# Patient Record
Sex: FEMALE | Race: BLACK OR AFRICAN AMERICAN | NOT HISPANIC OR LATINO | Employment: OTHER | URBAN - METROPOLITAN AREA
[De-identification: names, ages, dates, MRNs, and addresses within clinical notes are randomized per-mention and may not be internally consistent; named-entity substitution may affect disease eponyms.]

---

## 2017-01-13 ENCOUNTER — OFFICE VISIT (OUTPATIENT)
Dept: INTERNAL MEDICINE | Facility: CLINIC | Age: 62
End: 2017-01-13
Payer: MEDICARE

## 2017-01-13 VITALS
BODY MASS INDEX: 45.84 KG/M2 | SYSTOLIC BLOOD PRESSURE: 142 MMHG | WEIGHT: 249.13 LBS | HEART RATE: 90 BPM | DIASTOLIC BLOOD PRESSURE: 88 MMHG | HEIGHT: 62 IN | OXYGEN SATURATION: 97 %

## 2017-01-13 DIAGNOSIS — E78.5 HYPERLIPIDEMIA, UNSPECIFIED HYPERLIPIDEMIA TYPE: ICD-10-CM

## 2017-01-13 DIAGNOSIS — E66.01 MORBID OBESITY WITH BODY MASS INDEX (BMI) OF 45.0 TO 49.9 IN ADULT: ICD-10-CM

## 2017-01-13 DIAGNOSIS — E11.9 TYPE 2 DIABETES MELLITUS WITHOUT COMPLICATION, WITHOUT LONG-TERM CURRENT USE OF INSULIN: Primary | ICD-10-CM

## 2017-01-13 LAB — GLUCOSE SERPL-MCNC: 100 MG/DL (ref 70–110)

## 2017-01-13 PROCEDURE — 99999 PR PBB SHADOW E&M-EST. PATIENT-LVL III: CPT | Mod: PBBFAC,,, | Performed by: INTERNAL MEDICINE

## 2017-01-13 PROCEDURE — 99213 OFFICE O/P EST LOW 20 MIN: CPT | Mod: S$PBB,,, | Performed by: INTERNAL MEDICINE

## 2017-01-13 PROCEDURE — 99213 OFFICE O/P EST LOW 20 MIN: CPT | Mod: PBBFAC | Performed by: INTERNAL MEDICINE

## 2017-01-13 PROCEDURE — 82948 REAGENT STRIP/BLOOD GLUCOSE: CPT | Mod: PBBFAC | Performed by: INTERNAL MEDICINE

## 2017-01-13 RX ORDER — SIMVASTATIN 20 MG/1
20 TABLET, FILM COATED ORAL NIGHTLY
Qty: 30 TABLET | Refills: 2 | Status: SHIPPED | OUTPATIENT
Start: 2017-01-13 | End: 2017-05-17
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
## 2017-02-21 ENCOUNTER — DOCUMENTATION ONLY (OUTPATIENT)
Dept: BARIATRICS | Facility: CLINIC | Age: 62
End: 2017-02-21

## 2017-04-05 ENCOUNTER — TELEPHONE (OUTPATIENT)
Dept: ORTHOPEDICS | Facility: CLINIC | Age: 62
End: 2017-04-05

## 2017-04-05 DIAGNOSIS — M54.2 CERVICAL SPINE PAIN: Primary | ICD-10-CM

## 2017-04-25 ENCOUNTER — HOSPITAL ENCOUNTER (OUTPATIENT)
Dept: RADIOLOGY | Facility: HOSPITAL | Age: 62
Discharge: HOME | End: 2017-04-25
Attending: ORTHOPAEDIC SURGERY
Payer: MEDICARE

## 2017-04-25 ENCOUNTER — OFFICE VISIT (OUTPATIENT)
Dept: ORTHOPEDICS | Facility: CLINIC | Age: 62
End: 2017-04-25
Payer: MEDICARE

## 2017-04-25 VITALS — HEIGHT: 62 IN | WEIGHT: 252.88 LBS | BODY MASS INDEX: 46.53 KG/M2

## 2017-04-25 DIAGNOSIS — M25.562 CHRONIC PAIN OF BOTH KNEES: Primary | ICD-10-CM

## 2017-04-25 DIAGNOSIS — G89.29 CHRONIC PAIN OF BOTH KNEES: Primary | ICD-10-CM

## 2017-04-25 DIAGNOSIS — M25.561 CHRONIC PAIN OF BOTH KNEES: Primary | ICD-10-CM

## 2017-04-25 DIAGNOSIS — M25.561 CHRONIC PAIN OF BOTH KNEES: ICD-10-CM

## 2017-04-25 DIAGNOSIS — M17.9 OSTEOARTHRITIS OF KNEE, UNSPECIFIED: ICD-10-CM

## 2017-04-25 DIAGNOSIS — M25.562 CHRONIC PAIN OF BOTH KNEES: ICD-10-CM

## 2017-04-25 DIAGNOSIS — G89.29 CHRONIC PAIN OF BOTH KNEES: ICD-10-CM

## 2017-04-25 PROCEDURE — 99999 PR PBB SHADOW E&M-EST. PATIENT-LVL III: CPT | Mod: PBBFAC,,, | Performed by: ORTHOPAEDIC SURGERY

## 2017-04-25 PROCEDURE — 73562 X-RAY EXAM OF KNEE 3: CPT | Mod: 26,50,, | Performed by: RADIOLOGY

## 2017-04-25 PROCEDURE — 73560 X-RAY EXAM OF KNEE 1 OR 2: CPT | Mod: 26,59,RT, | Performed by: RADIOLOGY

## 2017-04-25 PROCEDURE — 73560 X-RAY EXAM OF KNEE 1 OR 2: CPT | Mod: TC,RT

## 2017-04-25 PROCEDURE — 73562 X-RAY EXAM OF KNEE 3: CPT | Mod: TC,50

## 2017-04-25 PROCEDURE — 99214 OFFICE O/P EST MOD 30 MIN: CPT | Mod: S$PBB,,, | Performed by: ORTHOPAEDIC SURGERY

## 2017-04-25 NOTE — PROGRESS NOTES
HPI:    Isabel Dennis is a 62 y.o. female who is here today for   Chief Complaint   Patient presents with    Left Knee - Pain    Right Knee - Pain   .     Duration: 36 months  Intensity: severe  Character of pain: sharp  Location: She reports that the pain is predominately  medial  Patient's pain increases with activity.  Pain is increased with weightbearing and interferes with activities of daily living.    She has tried conservative management including NSAIDS, injections, and activity modification without relief.    She has discussed options with her family and wishes to schedule TKA.     PMSFSH reviewed per clinic record       Past Medical History:   Diagnosis Date    Allergy     Anxiety     Arthritis     DDD (degenerative disc disease), cervical     Depression     Diabetes mellitus     Diverticulosis     GERD (gastroesophageal reflux disease)     Hx of colonic polyps     Lung disease     Post-traumatic osteoarthritis of both knees           Current Outpatient Prescriptions:     AMITIZA 24 mcg Cap, , Disp: , Rfl:     amitriptyline (ELAVIL) 50 MG tablet, Take 50 mg by mouth 2 (two) times daily.  , Disp: , Rfl:     carisoprodol (SOMA) 350 MG tablet, Take 350 mg by mouth 3 (three) times daily.  , Disp: , Rfl:     cod liver oil Oil, Take by mouth continuous prn., Disp: , Rfl:     ergocalciferol (VITAMIN D2) 50,000 unit Cap, Take 1 capsule (50,000 Units total) by mouth twice a week., Disp: 24 capsule, Rfl: 0    esomeprazole (NEXIUM) 20 MG capsule, Take 20 mg by mouth 2 (two) times daily before meals.  , Disp: , Rfl:     fish oil-omega-3 fatty acids 300-1,000 mg capsule, Take 2 g by mouth once daily., Disp: , Rfl:     FLAXSEED OIL ORAL, Take 1,000 mg by mouth once daily. Pt states takes 4 daily, total of 4000 mg daily, Disp: , Rfl:     fluticasone (FLONASE) 50 mcg/actuation nasal spray, 2 sprays by Each Nare route once daily., Disp: 1 Bottle, Rfl: 6    FREESTYLE LITE STRIPS Strp, , Disp: , Rfl:  "    ibuprofen (ADVIL,MOTRIN) 800 MG tablet, Take 800 mg by mouth 2 (two) times daily. , Disp: , Rfl:     naproxen (NAPROSYN) 500 MG tablet, , Disp: , Rfl:     NEXIUM 40 mg capsule, , Disp: , Rfl:     NORCO  mg Tab, Take 1 tablet by mouth. , Disp: , Rfl:     OPANA ER 30 mg TR12, , Disp: , Rfl:     potassium chloride SA (K-DUR,KLOR-CON) 20 MEQ tablet, Take 20 mEq by mouth once daily. , Disp: , Rfl:     diazePAM (VALIUM) 10 MG Tab, , Disp: , Rfl:     simvastatin (ZOCOR) 20 MG tablet, Take 1 tablet (20 mg total) by mouth every evening. For Cholesterol., Disp: 30 tablet, Rfl: 2     Review of patient's allergies indicates:   Allergen Reactions    Iodinated contrast media - oral and iv dye Hives and Rash        ROS  Constitutional: Negative for fever, Negative for weight loss  HENT Negative for congestion  Cardiovascular: Negative chest pain  Respiratory: Negative Shortness of breath  Heme: Negative excessive bleeding  Skin:NegativeItching, Negative breakdown  Musculoskeletal:Positive for back pain, Positive for joint pain, Negative muscle pain, Negative muscle weakness  Neurological: Negative for numbness and paresthesias   Psychiatric/Behavioral: Negative altered mental status, Negative for depression    Physical Exam:   Ht 5' 2" (1.575 m)  Wt 114.7 kg (252 lb 13.9 oz)  BMI 46.25 kg/m2  General appearance: This is a well-developed, Well nourished female No obvious acute distress.  Psychiatric: normal mood and affect;  pleasant and conversant; judgment and thought content normal  Gait is coordinated. Patient has antalgic gait to the right  Cardiovascular: There are no swelling or varicosities present.   Respiratory: normal respiratory effort   Lymphatic: no adenopathy   Neurologic: alert and oriented to person, place, and time   Deep Tendon Reflexes are normal;  Coordination and Balance: Normal   Musculoskeletal   Neck    ROM shows normal flexion and extension and lateral rotation    Palpation: " Non-tender    Stability is normal    Strength is normal    Skin is normal without masses and lesions    Sensation is intact to light touch   Back    ROM showsabnormal flexion, extension and     rotation    Palpation shows no masses    Stability is normal    Strength to flexion and extension well maintained    Core strength is diminished    Skin shows no rashes or cafe au lait spots;     Sensation is intact to light touch  Right hip   Range of motion normal    Left Hip  Range of motionnormal    Right Knee  Swelling None  TendernessMedial joint line  Range of Motion:   Motion is painfulYes  Crepitance presentYes    Right Leg  Neurologic Intact  Pulses Intact    Left Knee: Swelling Mild  TendernessMedial joint line  Range of Motion: 110   Motion is painful Yes    Left Leg   Neurologic Intact  Pulses Intact    Radiograph: Show severe degenerative arthritis with subchondral sclerosis, periarticular osteophytes and narrowing of joint space.  Angle: significant varus    Physical therapy is contraindicated due to potential bone loss on this severe arthritic joint.    Assessment:  Knee arthritis right      She will need to be cleared in our PreOp center.         .    She  has a past medical history of Allergy; Anxiety; Arthritis; DDD (degenerative disc disease), cervical; Depression; Diabetes mellitus; Diverticulosis; GERD (gastroesophageal reflux disease); colonic polyps; Lung disease; and Post-traumatic osteoarthritis of both knees. . We will have to take this into account. She  will be followed by the hospitalist service while in the hospital.       We have gone over the hospitalization and recovery with her as well.  This is typically around 2 weeks on a walker and transition to a cane after that.  She will have home health likely for 3-4 weeks and then transition to outpatient if necessary.  She is agreement with this plan of care and is ready to proceed.  I will see her back for clinical recheck at the 2-week  postop azucena.  I will see her back for clinical recheck for any other questions or problems as needed and certainly for any other issues in the interim.    We have discussed risks of total knee replacement which include but are not limited to blood clots in the legs that can travel to the lungs (pulmonary embolism). Pulmonary embolism can cause shortness of breath, chest pain, and even shock. Other risks include urinary tract infection, nausea and vomiting (usually related to pain medication), chronic knee pain and stiffness, bleeding into the knee joint, nerve damage, blood vessel injury, and infection of the knee which can require re-operation. Furthermore, the risks of anesthesia include potential heart, lung, kidney, and liver damage.

## 2017-04-26 DIAGNOSIS — M17.9 OSTEOARTHRITIS OF KNEE, UNSPECIFIED: Primary | ICD-10-CM

## 2017-05-04 ENCOUNTER — TELEPHONE (OUTPATIENT)
Dept: ORTHOPEDICS | Facility: CLINIC | Age: 62
End: 2017-05-04

## 2017-05-04 NOTE — TELEPHONE ENCOUNTER
----- Message from Pilar Ventura sent at 5/4/2017  9:35 AM CDT -----  Contact: self/home  Pt would like to reschedule her joint camp class.

## 2017-05-09 DIAGNOSIS — M79.606 PAIN OF LOWER EXTREMITY, UNSPECIFIED LATERALITY: ICD-10-CM

## 2017-05-09 DIAGNOSIS — R73.09 ELEVATED HEMOGLOBIN A1C: ICD-10-CM

## 2017-05-09 DIAGNOSIS — Z91.89 AT RISK OF UTI: Primary | ICD-10-CM

## 2017-05-16 ENCOUNTER — ANESTHESIA EVENT (OUTPATIENT)
Dept: SURGERY | Facility: HOSPITAL | Age: 62
DRG: 470 | End: 2017-05-16
Payer: MEDICARE

## 2017-05-16 ENCOUNTER — OFFICE VISIT (OUTPATIENT)
Dept: ORTHOPEDICS | Facility: CLINIC | Age: 62
End: 2017-05-16
Payer: MEDICARE

## 2017-05-16 VITALS — WEIGHT: 249.13 LBS | HEIGHT: 62 IN | BODY MASS INDEX: 45.84 KG/M2

## 2017-05-16 DIAGNOSIS — M17.11 PRIMARY OSTEOARTHRITIS OF RIGHT KNEE: Primary | ICD-10-CM

## 2017-05-16 PROCEDURE — 99213 OFFICE O/P EST LOW 20 MIN: CPT | Mod: PBBFAC | Performed by: PHYSICIAN ASSISTANT

## 2017-05-16 PROCEDURE — 99499 UNLISTED E&M SERVICE: CPT | Mod: S$PBB,,, | Performed by: PHYSICIAN ASSISTANT

## 2017-05-16 PROCEDURE — 99999 PR PBB SHADOW E&M-EST. PATIENT-LVL III: CPT | Mod: PBBFAC,,, | Performed by: PHYSICIAN ASSISTANT

## 2017-05-16 RX ORDER — OXYMORPHONE HYDROCHLORIDE 20 MG/1
30 TABLET, FILM COATED, EXTENDED RELEASE ORAL DAILY
Status: ON HOLD | COMMUNITY
End: 2017-06-05 | Stop reason: HOSPADM
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
## 2017-05-17 ENCOUNTER — HOSPITAL ENCOUNTER (OUTPATIENT)
Dept: CARDIOLOGY | Facility: CLINIC | Age: 62
Discharge: HOME | End: 2017-05-17
Payer: MEDICARE

## 2017-05-17 ENCOUNTER — HOSPITAL ENCOUNTER (OUTPATIENT)
Dept: PREADMISSION TESTING | Facility: HOSPITAL | Age: 62
Discharge: HOME | End: 2017-05-17
Attending: ANESTHESIOLOGY
Payer: MEDICARE

## 2017-05-17 ENCOUNTER — INITIAL CONSULT (OUTPATIENT)
Dept: INTERNAL MEDICINE | Facility: CLINIC | Age: 62
End: 2017-05-17
Payer: MEDICARE

## 2017-05-17 ENCOUNTER — OFFICE VISIT (OUTPATIENT)
Dept: NEUROLOGY | Facility: CLINIC | Age: 62
End: 2017-05-17
Payer: MEDICARE

## 2017-05-17 VITALS
HEART RATE: 84 BPM | BODY MASS INDEX: 47.59 KG/M2 | HEIGHT: 62 IN | SYSTOLIC BLOOD PRESSURE: 138 MMHG | DIASTOLIC BLOOD PRESSURE: 80 MMHG | WEIGHT: 258.63 LBS

## 2017-05-17 VITALS
OXYGEN SATURATION: 97 % | WEIGHT: 258.63 LBS | SYSTOLIC BLOOD PRESSURE: 138 MMHG | HEART RATE: 84 BPM | DIASTOLIC BLOOD PRESSURE: 84 MMHG | BODY MASS INDEX: 47.59 KG/M2 | TEMPERATURE: 98 F | HEIGHT: 62 IN

## 2017-05-17 VITALS
BODY MASS INDEX: 47.59 KG/M2 | WEIGHT: 258.63 LBS | OXYGEN SATURATION: 97 % | RESPIRATION RATE: 20 BRPM | HEIGHT: 62 IN | TEMPERATURE: 98 F

## 2017-05-17 DIAGNOSIS — R73.09 ELEVATED HEMOGLOBIN A1C: ICD-10-CM

## 2017-05-17 DIAGNOSIS — M79.604 PAIN IN BOTH LOWER EXTREMITIES: ICD-10-CM

## 2017-05-17 DIAGNOSIS — M54.2 CERVICALGIA: Primary | ICD-10-CM

## 2017-05-17 DIAGNOSIS — M79.606 PAIN OF LOWER EXTREMITY, UNSPECIFIED LATERALITY: ICD-10-CM

## 2017-05-17 DIAGNOSIS — R60.9 EDEMA, UNSPECIFIED TYPE: ICD-10-CM

## 2017-05-17 DIAGNOSIS — M79.605 PAIN IN BOTH LOWER EXTREMITIES: ICD-10-CM

## 2017-05-17 DIAGNOSIS — F11.90 CHRONIC, CONTINUOUS USE OF OPIOIDS: ICD-10-CM

## 2017-05-17 DIAGNOSIS — M15.9 OSTEOARTHRITIS OF MULTIPLE JOINTS, UNSPECIFIED OSTEOARTHRITIS TYPE: ICD-10-CM

## 2017-05-17 DIAGNOSIS — K59.00 CONSTIPATION, UNSPECIFIED CONSTIPATION TYPE: ICD-10-CM

## 2017-05-17 DIAGNOSIS — K21.9 GASTROESOPHAGEAL REFLUX DISEASE, ESOPHAGITIS PRESENCE NOT SPECIFIED: ICD-10-CM

## 2017-05-17 DIAGNOSIS — L81.8 TATTOOS: ICD-10-CM

## 2017-05-17 DIAGNOSIS — R20.2 PARESTHESIA OF RIGHT FOOT: ICD-10-CM

## 2017-05-17 DIAGNOSIS — D69.6 THROMBOCYTOPENIA: ICD-10-CM

## 2017-05-17 DIAGNOSIS — Z01.818 PREOP EXAMINATION: Primary | ICD-10-CM

## 2017-05-17 DIAGNOSIS — E11.9 TYPE 2 DIABETES MELLITUS WITHOUT COMPLICATION, WITHOUT LONG-TERM CURRENT USE OF INSULIN: ICD-10-CM

## 2017-05-17 DIAGNOSIS — E66.01 MORBID OBESITY, UNSPECIFIED OBESITY TYPE: ICD-10-CM

## 2017-05-17 DIAGNOSIS — F41.8 DEPRESSION WITH ANXIETY: ICD-10-CM

## 2017-05-17 DIAGNOSIS — Z91.89 AT RISK OF UTI: ICD-10-CM

## 2017-05-17 DIAGNOSIS — E66.01 OBESITY, MORBID, BMI 50 OR HIGHER: ICD-10-CM

## 2017-05-17 PROCEDURE — 99215 OFFICE O/P EST HI 40 MIN: CPT | Mod: S$PBB,,, | Performed by: HOSPITALIST

## 2017-05-17 PROCEDURE — 99205 OFFICE O/P NEW HI 60 MIN: CPT | Mod: S$PBB,,,

## 2017-05-17 PROCEDURE — 99999 PR PBB SHADOW E&M-EST. PATIENT-LVL III: CPT | Mod: PBBFAC,,, | Performed by: HOSPITALIST

## 2017-05-17 PROCEDURE — 99213 OFFICE O/P EST LOW 20 MIN: CPT | Mod: PBBFAC,27 | Performed by: HOSPITALIST

## 2017-05-17 PROCEDURE — 99999 PR PBB SHADOW E&M-EST. PATIENT-LVL III: CPT | Mod: PBBFAC,,,

## 2017-05-17 PROCEDURE — 93010 ELECTROCARDIOGRAM REPORT: CPT | Mod: S$PBB,,, | Performed by: INTERNAL MEDICINE

## 2017-05-17 RX ORDER — MULTIVIT WITH MINERALS/HERBS
1 TABLET ORAL DAILY
COMMUNITY
End: 2020-02-05

## 2017-05-17 NOTE — PRE-PROCEDURE INSTRUCTIONS
PreOp Instructions given:  - Written medication information (what to hold and what to take)  - NPO guidelines  - Arrival place directions given  - Time to be given the day before procedure by the  Surgeon's Office  - Bathing with antibacterial soap/Hibiclens   - Don't wear any jewelry or bring any valuables AM of surgery  - No makeup or moisturizer to face  - No perfume/cologne, powder, lotions or aftershave    Pt. verbalized understanding.

## 2017-05-17 NOTE — PROGRESS NOTES
This office note has been dictated.  HISTORY OF PRESENT ILLNESS:  This is my first clinic encounter with patient,   Isabel Dennis.  This 62-year-old left-handed lady presents to Neurology Clinic   with a chief complaint of neck discomfort.  The history of present illness   probably begins more than 20 years ago.  She had surgery performed by Dr. Bullock   at [facility] on the cervical spine.  A second procedure was then performed   in 2005, at [facility] by Dr. Rodriguez.  This was a fusion with   hardware.  She apparently did well until perhaps 4 to 6 months ago when she   suffered a fall and struck her head, but was not knocked unconscious.  Her neck   has been hurting her since.  Sometimes while walking, she notices a numb   sensation extending from her neck down to her right heel.    NEUROLOGICAL REVIEW OF SYSTEMS:  She has night sweats related to the menopause.    There is occasional numbness in the left hand.  She also has bilateral leg   pain, particularly in the evening time as well as bilateral knee pain during the   day, which will likely require replacements.  She is unsteady on her feet   because of her degenerative arthritis in the knees.  She sometimes notes   blurriness of vision and admits to an element of anxiety and depression.  Elavil   has been prescribed for her.  She also suffers from constipation, which is   likely medication related secondary to Norco and occasional oxymorphone, which   she takes for her knee pain.  She denies fever, dizziness, tinnitus, hearing loss,  Headaches, speech or swallowing difficulty, chest pain, cough,shortness of breath,  Nausea, vomiting, focal weakness, and incontinence.    PAST MEDICAL HISTORY:  Reviewed with the patient, edited by me in the electronic   record and is included in this note.    PHYSICAL EXAMINATION:  GENERAL:  She is morbidly obese, neatly dressed and in no acute distress.  VITAL SIGNS:  Reviewed and also included in this note.  NECK:   Supple.  The pulses equal and no bruits are heard.  NEUROLOGIC:  This is an alert, oriented and attentive lady.  Her speech is   appropriate and adequately articulated.  Cranial nerve examination reveals her   acuity to be 20/30 to the near card without correction.  The visual fields are   full.  Pupils are equal and reactive to light and the extraocular movements are   conjugate.  The optic disks cannot be well visualized.  There are no   sensorimotor deficits on the face and tongue and palate are in the midline.    Hearing is equal in the two ears.    On motor examination, there is no focal motor weakness.  She has normal   muscle bulk and tone in all extremities.    On sensory examination, all modalities are intact and she has palpable peripheral pulses.    On cerebellar testing, finger-to-nose, fine motor and rapid alternating   movements are adequately and equally performed.  She can rise from the chair   without pushing up with her hands and is able to walk on a narrow base with an   antalgic gait because of knee pain.    Romberg sign is not present.    The deep tendon reflexes are symmetrical with the exception of the right ankle jerk,   which is diminished.  Her plantar responses are flexor bilaterally.    I reviewed her clinic record and discussed the situation with her in detail.    ASSESSMENT AND PLAN:  In conclusion, this is a lady with a history of 2 prior   neck surgeries as well as degenerative arthritis of the knees.  Her main   complaints are neck pain, paresthesias extending from the neck down to the right   heel with ambulation, and bilateral leg pain.  She is tentatively scheduled for   knee replacement surgery, but does have concerns about her spine at this point.    I will go ahead and obtain some screening blood work today for arthritic   conditions and refer her for outpatient MR imaging of the cervical and lumbar   spine.  I will forward the results to her with any additional  recommendations as   indicated.  I am otherwise referring her back to her primary care for her for  follow up.      At the conclusion of the encounter, all questions were answered.  She may   return to Neurology Clinic on an as needed basis.      FSO/HN  dd: 05/17/2017 14:01:07 (CDT)  td: 05/17/2017 14:41:38 (CDT)  Doc ID   #[id number]  Job ID #[id number]    CC:

## 2017-05-17 NOTE — MR AVS SNAPSHOT
[facility]  [address]  [place] [address]  Phone: [phone]  Fax: [phone]                  Isabel Dennis   2017 1:40 PM   Office Visit    Description:  Female : 1955   Provider:  Jean Byrnes III, MD   Department:  [facility] - Neurology           Reason for Visit     Consult           Diagnoses this Visit        Comments    Cervicalgia    -  Primary     Paresthesia of right foot         Pain in both lower extremities         Type 2 diabetes mellitus without complication, without long-term current use of insulin         Osteoarthritis of multiple joints, unspecified osteoarthritis type         Obesity, morbid, BMI 50 or higher         Depression with anxiety                To Do List           Future Appointments        Provider Department Dept Phone    2017  3:00 PM Caty Red NP [facility] [phone]    2017 4:00 PM Cindi Link MD [facility] - Pre Op Consult [phone]    2017 1:30 PM JOINT CAMP, [facility] - Orthopedics Class [phone]    2017 10:30 AM Sharda Sandhu PA-C [facility] [phone]    2017 9:30 AM Sadia De La Rosa MD [facility] - Internal Medicine [phone]      Your Future Surgeries/Procedures     May 31, 2017   Surgery with [facility]   [facility] ([facility])    [address] [facility] [address] [phone]              Goals (5 Years of Data)     None      Follow-Up and Disposition     Return if symptoms worsen or fail to improve.      [facility]     [facility] Nurse Care Line - 24/ Assistance  Unless otherwise directed by your provider, please contact [facility], our nurse care line that is available for 24/7 assistance.     Registered nurses in the [facility] provide: appointment scheduling, clinical advisement, health education, and other  advisory services.  Call: [phone] (toll free)               Medications           Message regarding Medications     Verify the changes and/or additions to your medication regime listed below are the same as discussed with your clinician today.  If any of these changes or additions are incorrect, please notify your healthcare provider.             Verify that the below list of medications is an accurate representation of the medications you are currently taking.  If none reported, the list may be blank. If incorrect, please contact your healthcare provider. Carry this list with you in case of emergency.           Current Medications     AMITIZA 24 mcg Cap     amitriptyline (ELAVIL) 50 MG tablet Take 50 mg by mouth 2 (two) times daily.      carisoprodol (SOMA) 350 MG tablet Take 350 mg by mouth 3 (three) times daily.      cod liver oil Oil Take by mouth continuous prn.    ergocalciferol (VITAMIN D2) 50,000 unit Cap Take 1 capsule (50,000 Units total) by mouth twice a week.    esomeprazole (NEXIUM) 20 MG capsule Take 20 mg by mouth 2 (two) times daily before meals.      fish oil-omega-3 fatty acids 300-1,000 mg capsule Take 2 g by mouth once daily.    FLAXSEED OIL ORAL Take 1,000 mg by mouth once daily. Pt states takes 4 daily, total of 4000 mg daily    fluticasone (FLONASE) 50 mcg/actuation nasal spray 2 sprays by Each Nare route once daily.    FREESTYLE LITE STRIPS Strp     ibuprofen (ADVIL,MOTRIN) 800 MG tablet Take 800 mg by mouth 2 (two) times daily.     naproxen (NAPROSYN) 500 MG tablet     NEXIUM 40 mg capsule     NORCO  mg Tab Take 1 tablet by mouth.     oxyMORphone (OPANA ER) 20 MG 12 hr tablet Take 20 mg by mouth every 12 (twelve) hours.    potassium chloride SA (K-DUR,KLOR-CON) 20 MEQ tablet Take 20 mEq by mouth once daily.     diazePAM (VALIUM) 10 MG Tab     simvastatin (ZOCOR) 20 MG tablet Take 1 tablet (20 mg total) by mouth every evening. For Cholesterol.           Clinical Reference  "Information           Your Vitals Were     BP Pulse Height Weight BMI    173/90 84 5' 2" (1.575 m) 117.3 kg (258 lb 9.6 oz) 47.3 kg/m2      Blood Pressure          Most Recent Value    BP  (!)  173/90      Allergies as of 5/17/2017     Iodinated Contrast Media - Oral And Iv Dye      Immunizations Administered on Date of Encounter - 5/17/2017     None      Orders Placed During Today's Visit     Future Labs/Procedures Expected by Expires    MARIPOSA  5/17/2017 7/16/2018    MRI Cervical Spine Without Contrast  5/17/2017 5/17/2018    MRI Lumbar Spine Without Contrast  5/17/2017 5/17/2018    Protein electrophoresis, serum  5/17/2017 7/16/2018    Rheumatoid factor  5/17/2017 7/16/2018      [facility] Sign-Up     Activating your [facility] account is as easy as 1-2-3!     1) Visit [email or web address], select Sign Up Now, enter this activation code and your date of birth, then select Next.  [email or web address]  Expires: 7/1/2017 12:45 PM      2) Create a username and password to use when you visit [facility] in the future and select a security question in case you lose your password and select Next.    3) Enter your e-mail address and click Sign Up!    Additional Information  If you have questions, please e-mail [email or web address] or call [phone] to talk to our [facility] staff. Remember, [facility] is NOT to be used for urgent needs. For medical emergencies, dial 911.         Language Assistance Services     ATTENTION: Language assistance services are available, free of charge. Please call [phone].      ATENCIÓN: Si habla español, tiene a holley disposición servicios gratuitos de asistencia lingüística. Llame al [phone].     CHÚ Ý: N?u b?n nói Ti?ng Vi?t, có các d?ch v? h? tr? ngôn ng? mi?n phí dành cho b?n. G?i s? [phone].         [facility] complies with applicable Federal civil rights laws and does not discriminate on the basis of race, color, national origin, age, disability, or sex.        "

## 2017-05-17 NOTE — PRE-PROCEDURE INSTRUCTIONS
Anesthesia: Regional Anesthesia  Youre scheduled for surgery. During surgery, youll receive medication called anesthesia to keep you comfortable and pain-free. Your surgeon has decided that youll receive regional anesthesia. This sheet tells you what to expect with this type of anesthesia.  What Is Regional Anesthesia?  Regional anesthesia numbs one region of your body. The anesthesia may be given around nerves or into veins in your arms, neck, or legs (nerve block or [place] block). Or it may be sent into the spinal fluid (spinal anesthesia) or into the space just outside the spinal fluid (epidural anesthesia). Sedatives may also be given to relax you.  Nerve Block or Abram Block  A small area of the body, such as an arm or leg, can be numbed using a nerve block or Abram block.  · Nerve block: During a nerve block, your skin is numbed. A needle is then inserted near nerves that serve the area to be numbed. Anesthetic is sent through the needle.  · IV regional or [place] block: For this type of block, an IV line is put into a vein. The blood flow to the area to be numbed is blocked for a short time. Anesthetic is sent through the IV.  Spinal Anesthesia  Spinal anesthesia numbs your body from about the waist down.  · Anesthetic is injected into the spinal fluid. This is a substance that surrounds the spinal cord in your spinal column. The anesthetic blocks pain traveling from the body to the brain.  · To receive the anesthetic, your skin is numbed at the injection site.  · A needle is then inserted into the spinal fluid. Anesthetic is sent through the needle.  Anesthesia Tools and Medications  · Local anesthetic given through a needle numbs one region of your body.  · Electrocardiography leads (electrodes) record your heart rate and rhythm.  · A blood pressure cuff monitors your blood pressure.  · A pulse oximeter on the end of the finger measures your blood oxygen level.  · Sedatives may be given through an IV to relax  you and keep you comfortable. You may stay awake or sleep slightly.  · Oxygen may be given to you through a facemask.  Risks and Possible Complications  Regional anesthesia carries some risks. These include:  · Nausea and vomiting  · Headache  · Backache  · Decreased blood pressure  · Allergic reaction to the anesthetic  · Ongoing numbness (rare)  · Irregular heartbeat (rare)  · Cardiac arrest (rare)   © 8286-6612 [facility], [address] [place] [address], [place] [address]. All rights reserved. This information is not intended as a substitute for professional medical care. Always follow your healthcare professional's instructions.

## 2017-05-17 NOTE — LETTER
May 17, 2017      Humberto Solis MD  [address] [facility] [address]           [facility]  [address] [facility] [address]  Phone: [phone]          Patient: Isabel Dennis   MR Number: [id number]   YOB: 1955   Date of Visit: 5/17/2017       Dear Dr. Humberto Solis:    Thank you for referring Isabel Dennis to me for evaluation. Attached you will find relevant portions of my assessment and plan of care.    If you have questions, please do not hesitate to call me. I look forward to following Isabel Dennis along with you.    Sincerely,    Cindi Link MD    Enclosure  CC:  No Recipients    If you would like to receive this communication electronically, please contact [email or web address] or [phone] to request more information on [employer] Link access.    For providers and/or their staff who would like to refer a patient to [facility], please contact us through our one-stop-shop provider referral line, [facility], at [phone].    If you feel you have received this communication in error or would no longer like to receive these types of communications, please e-mail [email or web address]

## 2017-05-17 NOTE — DISCHARGE INSTRUCTIONS
Your surgery has been scheduled for:__________________________________________    You should report to:  ____[facility], located on the [place] side of the first floor of the           [facility] ([phone])  ____[facility], located on the [facility] side of the            Second floor of the [facility] ([phone])  ____3rd Floor [facility] located on the [facility] side of the [facility] [phone]  Please Note   - Tell your doctor if you take Aspirin, products containing Aspirin, herbal medications  or blood thinners, such as Coumadin, Ticlid, or Plavix.  (Consult your provider regarding holding or stopping before surgery).  - Arrange for someone to drive you home following surgery.  You will not be allowed to leave the surgical facility alone or drive yourself home following sedation and anesthesia.  Before Surgery  - Stop taking all herbal medications 14days prior to surgery  - No Motrin/Advil (Ibuprofen) 7 days before surgery  - No Aleve (Naproxen) 7 days before surgery  - Stop Taking Asprin, products containing Asprin _____days before surgery  - Stop taking blood thinners_______days before surgery  - Refrain from drinking alcoholic beverages for 24hours before and after surgery  - Stop or limit smoking _________days before surgery  Night before Surgery  - DO NOT EAT OR DRINK ANYTHING AFTER MIDNIGHT, INCLUDING GUM, HARD CANDY, MINTS, OR CHEWING TOBACCO.  - Take a shower or bath (shower is recommended).  Bathe with Hibiclens soap or an antibacterial soap from the neck down.  If not supplied by your surgeon, hibiclens soap will need to be purchased over the counter in pharmacy.  Rinse soap off thoroughly.  - Shampoo your hair with your regular shampoo  The Day of Surgery  - Take another bath or shower with hibiclens or any antibacterial soap, to reduce the chance of infection.  - Take heart and blood  pressure medications with a small sip of water, as advised by the perioperative team.  - Do not take fluid pills  - You may brush your teeth and rinse your mouth, but do not swall any additional water.   - Do not apply perfumes, powder, body lotions or deodorant on the day of surgery.  - Nail polish should be removed.  - Do not wear makeup or moisturizer  - Wear comfortable clothes, such as a button front shirt and loose fitting pants.  - Leave all jewelry, including body piercings, and valuables at home.    - Bring any devices you will neeed after surgery such as crutches or canes.  - If you have sleep apnea, please bring your CPAP machine  In the event that your physical condition changes including the onset of a cold or respiratory illness, or if you have to delay or cancel your surgery, please notify your surgeon.  Anesthesia: General Anesthesia  Youre due to have surgery. During surgery, youll be given medication called anesthesia. (It is also called anesthetic.) This will keep you comfortable and pain-free. Your surgeon will use general anesthesia. This sheet tells you more about it.    What Is General Anesthesia?  General anesthesia puts you into a state like deep sleep. It goes into the bloodstream (IV anesthetics), into the lungs (gas anesthetics), or both. You feel nothing during the procedure. You will not remember it. During the procedure, the anesthesia provider monitors you. He or she checks your heart rate and rhythm, blood pressure, and blood oxygen.  · IV Anesthetics  IV anesthetics are given through an IV line in your arm. Theyre often given first. This is so you are asleep before a gas anesthetic is started. Some kinds of IV anesthetics relieve pain. Others relax you. Your doctor will decide which kind is best in your case.  · Gas Anesthetics  Gas anesthetics are breathed into the lungs. They are often used to keep you asleep. They can be given through a facemask, an endotracheal tube, or a  laryngeal mask airway.  ¨ If you have a facemask, your anesthesia provider will most likely place it over your nose and mouth while youre still awake. Youll breathe oxygen through the mask as your IV anesthetic is started. Gas anesthetic may be added through the mask.  ¨ If you have an endotracheal tube or a laryngeal mask airway, it will be inserted into your throat after youre asleep.  Anesthesia Tools and Medications  You will likely have:  · IV anesthetics sent through an IV line into your bloodstream.  · Gas anesthetics breathed into your lungs, where they pass into your bloodstream.  · A pulse oximeter on the end of your finger. This measures your blood oxygen level.  · Electrocardiography leads (electrodes) on your chest. These record your heart rate and rhythm.  · A blood pressure cuff. This reads your blood pressure.  Risks and Possible Complications  General anesthesia has some risks. These include:  · Breathing problems  · Nausea and vomiting  · Sore throat or hoarseness  · Allergic reaction to the anesthetic  · Ongoing numbness (rare)  · Irregular heartbeat (rare)  · Cardiac arrest (rare)   Anesthesia Safety  · Follow all instructions you are given for how long not to eat or drink before your procedure.  · Be sure your doctor knows what medications you take. This includes over-the-counter medications, herbs, and supplements.  · Have an adult family member or friend drive you home after the procedure.  · For the first 24 hours after your surgery:  ¨ Do not drive or use heavy equipment.  ¨ Do not make important decisions or sign documents.  ¨ Avoid alcohol.  ¨ Have someone stay with you, if possible. They can watch for problems and help keep you safe.  © 0797-1800 [employer], [address] [place] [address], [place] [address]. All rights reserved. This information is not intended as a substitute for professional medical care. Always follow your healthcare professional's instructions.

## 2017-05-17 NOTE — LETTER
May 17, 2017      Sadia De La Rosa MD  [address]  [facility] [address]           [facility]  [address]  [facility] [address]  Phone: [phone]  Fax: [phone]          Patient: Isabel Dennis   MR Number: [id number]   YOB: 1955   Date of Visit: 5/17/2017       Dear Dr. Sadia De La Rosa:    Thank you for referring Isabel Dennis to me for evaluation. Attached you will find relevant portions of my assessment and plan of care.    If you have questions, please do not hesitate to call me. I look forward to following Isabel Dennis along with you.    Sincerely,    Jean Byrnes III, MD    Enclosure  CC:  No Recipients    If you would like to receive this communication electronically, please contact [email or web address] or [phone] to request more information on [email or web address] Link access.    For providers and/or their staff who would like to refer a patient to [facility], please contact us through our one-stop-shop provider referral line, [facility] , at [phone].    If you feel you have received this communication in error or would no longer like to receive these types of communications, please e-mail [email or web address]

## 2017-05-17 NOTE — PATIENT INSTRUCTIONS
Verified PreOp Instructions given:    -- Medication information (what to hold and what to take)   -- NPO guidelines -- DO NOT EAT ANYTHING AFTER MIDNIGHT, INCLUDING GUM, HARD CANDY, MINTS, OR CHEWING TOBACCO.  -- Arrival place and directions given; time to be given the day before procedure by the Surgeon's Office   -- Bathing with antibacterial soap   -- Don't wear any jewelry or bring any valuables AM of surgery   -- No makeup or moisturizer to face   -- No perfume/cologne, powder, lotions or aftershave     Pt verbalized understanding.
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
## 2017-05-17 NOTE — IP AVS SNAPSHOT
[facility]  [address]  [facility] [address]  Phone: [phone]           Patient Discharge Instructions  Our goal is to set you up for success. This packet includes information on your condition, medications, and your home care. It will help you care for yourself to prevent having to return to the hospital.     Please ask your nurse if you have any questions.      There are many details to remember when preparing for your surgery. Here is what you will need to do, please ask your nurse if there are more specific instructions and if you have any questions:    1. Before procedure Do not smoke or drink alcoholic beverages 24 hours prior to your procedure. Do not eat or drink anything 8 hours before your procedure - this includes gum, mints, and candy.     2. Day of procedure Please remove all jewelry for the procedure. If you wear contact lenses, dentures, hearing aids or glasses, bring a container to put them in during your surgery and give to a family member.  If your doctor has scheduled you for an overnight stay, bring a small overnight bag with any personal items that you need.      3. After procedure  Make arrangements in advance for transportation home by a responsible adult. It is not safe to drive a vehicle during the 24 hours following surgery.     PLEASE NOTE: You may be contacted the day before your surgery to confirm your surgery date and arrival time. The Surgery schedule has many variables which may affect the time of your surgery case. Family members should be available if your surgery time changes.           [facility]  Unless otherwise directed by your provider, please   contact [facility], our nurse care line   that is available for 24/7 assistance.     [phone] (toll-free)     Registered nurses in the [facility]   provide: appointment scheduling, clinical advisement, health education, and other advisory services.                   ** Verify the list of medication(s) below is accurate and up to date. Carry this with you in case of emergency. If your medications have changed, please notify your healthcare provider.             Medication List      TAKE these medications        Additional Info                      AMITIZA 24 MCG Cap   Refills:  0   Generic drug:  lubiprostone      Begin Date    AM    Noon    PM    Bedtime       amitriptyline 50 MG tablet   Commonly known as:  ELAVIL   Refills:  0   Dose:  50 mg   Indications:  Depression    Instructions:  Take 50 mg by mouth 2 (two) times daily.     Begin Date    AM    Noon    PM    Bedtime       carisoprodol 350 MG tablet   Commonly known as:  SOMA   Refills:  0   Dose:  350 mg   Indications:  Muscle Spasm    Instructions:  Take 350 mg by mouth 3 (three) times daily.     Begin Date    AM    Noon    PM    Bedtime       cod liver oil Oil   Refills:  0    Instructions:  Take by mouth continuous prn.     Begin Date    AM    Noon    PM    Bedtime       diazePAM 10 MG Tab   Commonly known as:  VALIUM   Refills:  0      Begin Date    AM    Noon    PM    Bedtime       ergocalciferol 50,000 unit Cap   Commonly known as:  VITAMIN D2   Quantity:  24 capsule   Refills:  0   Dose:  98647 Units    Instructions:  Take 1 capsule (50,000 Units total) by mouth twice a week.     Begin Date    AM    Noon    PM    Bedtime       * esomeprazole 20 MG capsule   Commonly known as:  NEXIUM   Refills:  0   Dose:  20 mg    Instructions:  Take 20 mg by mouth 2 (two) times daily before meals.     Begin Date    AM    Noon    PM    Bedtime       * NEXIUM 40 MG capsule   Refills:  0   Generic drug:  esomeprazole      Begin Date    AM    Noon    PM    Bedtime       fish oil-omega-3 fatty acids 300-1,000 mg capsule   Refills:  0   Dose:  2 g    Instructions:  Take 2 g by mouth once daily.     Begin Date    AM    Noon    PM    Bedtime       FLAXSEED OIL ORAL   Refills:  0   Dose:  1000 mg    Instructions:  Take 1,000 mg by  mouth once daily. Pt states takes 4 daily, total of 4000 mg daily     Begin Date    AM    Noon    PM    Bedtime       fluticasone 50 mcg/actuation nasal spray   Commonly known as:  FLONASE   Quantity:  1 Bottle   Refills:  6   Dose:  2 spray    Instructions:  2 sprays by Each Nare route once daily.     Begin Date    AM    Noon    PM    Bedtime       FREESTYLE LITE STRIPS Strp   Refills:  0   Generic drug:  blood sugar diagnostic      Begin Date    AM    Noon    PM    Bedtime       ibuprofen 800 MG tablet   Commonly known as:  ADVIL,MOTRIN   Refills:  0   Dose:  800 mg    Instructions:  Take 800 mg by mouth 2 (two) times daily.     Begin Date    AM    Noon    PM    Bedtime       naproxen 500 MG tablet   Commonly known as:  NAPROSYN   Refills:  0      Begin Date    AM    Noon    PM    Bedtime       NORCO  mg Tab   Refills:  0   Dose:  1 tablet   Generic drug:  hydrocodone-acetaminophen 10-325mg    Instructions:  Take 1 tablet by mouth.     Begin Date    AM    Noon    PM    Bedtime       oxyMORphone 20 MG 12 hr tablet   Commonly known as:  OPANA ER   Refills:  0   Dose:  20 mg    Instructions:  Take 20 mg by mouth every 12 (twelve) hours.     Begin Date    AM    Noon    PM    Bedtime       potassium chloride SA 20 MEQ tablet   Commonly known as:  K-DUR,KLOR-CON   Refills:  0   Dose:  20 mEq    Instructions:  Take 20 mEq by mouth once daily.     Begin Date    AM    Noon    PM    Bedtime       simvastatin 20 MG tablet   Commonly known as:  ZOCOR   Quantity:  30 tablet   Refills:  2   Dose:  20 mg    Instructions:  Take 1 tablet (20 mg total) by mouth every evening. For Cholesterol.     Begin Date    AM    Noon    PM    Bedtime       * Notice:  This list has 2 medication(s) that are the same as other medications prescribed for you. Read the directions carefully, and ask your doctor or other care provider to review them with you.               Please bring to all follow up appointments:    1. A copy of your discharge  instructions.  2. All medicines you are currently taking in their original bottles.  3. Identification and insurance card.    Please arrive 15 minutes ahead of scheduled appointment time.    Please call 24 hours in advance if you must reschedule your appointment and/or time.        Your Scheduled Appointments     May 17, 2017  4:00 PM CDT   Consult with Cindi Link MD   [facility] - Pre Op Consult ([facility] )    [address] [facility] [id number] [phone]            May 18, 2017  1:30 PM CDT   Education Class with JOINT CAMP, [facility] - Orthopedics Class ([facility] )    [address]  [place] [id number] [phone]            Jun 13, 2017 10:30 AM CDT   Post OP with ALTA Padilla [facility] - Orthopedics ([facility] )    [address]  [place] [id number] [phone]            Jun 13, 2017  1:30 PM CDT   Mri L Spine Non Cont with [facility] WIDE BORE   [facility] ([facility] )    [address] [facility] [id number] [phone]            Jun 13, 2017  2:15 PM CDT   Mri C Spine Non Cont with [facility] WIDE BORE   [facility] ([facility] )    [address] [facility] [id number] [phone]              Your Future Surgeries/Procedures     May 31, 2017   Surgery with [facility]   [facility] ([facility])    [address] [facility] [id number] [phone]                  Discharge Instructions       Your surgery has been scheduled for:__________________________________________    You should report to:  ____[facility], located on the [place] side of the first floor of the           [facility] ([phone])  ____[facility], located on the [facility] side of the            Second floor of  the [facility] ([phone])  ____3rd Floor [facility] located on the [facility] side of the [facility] [phone]  Please Note   - Tell your doctor if you take Aspirin, products containing Aspirin, herbal medications  or blood thinners, such as Coumadin, Ticlid, or Plavix.  (Consult your provider regarding holding or stopping before surgery).  - Arrange for someone to drive you home following surgery.  You will not be allowed to leave the surgical facility alone or drive yourself home following sedation and anesthesia.  Before Surgery  - Stop taking all herbal medications 14days prior to surgery  - No Motrin/Advil (Ibuprofen) 7 days before surgery  - No Aleve (Naproxen) 7 days before surgery  - Stop Taking Asprin, products containing Asprin _____days before surgery  - Stop taking blood thinners_______days before surgery  - Refrain from drinking alcoholic beverages for 24hours before and after surgery  - Stop or limit smoking _________days before surgery  Night before Surgery  - DO NOT EAT OR DRINK ANYTHING AFTER MIDNIGHT, INCLUDING GUM, HARD CANDY, MINTS, OR CHEWING TOBACCO.  - Take a shower or bath (shower is recommended).  Bathe with Hibiclens soap or an antibacterial soap from the neck down.  If not supplied by your surgeon, hibiclens soap will need to be purchased over the counter in pharmacy.  Rinse soap off thoroughly.  - Shampoo your hair with your regular shampoo  The Day of Surgery  - Take another bath or shower with hibiclens or any antibacterial soap, to reduce the chance of infection.  - Take heart and blood pressure medications with a small sip of water, as advised by the perioperative team.  - Do not take fluid pills  - You may brush your teeth and rinse your mouth, but do not swall any additional water.   - Do not apply perfumes, powder, body lotions or deodorant on the day of surgery.  - Nail polish should be removed.  - Do not wear makeup or  moisturizer  - Wear comfortable clothes, such as a button front shirt and loose fitting pants.  - Leave all jewelry, including body piercings, and valuables at home.    - Bring any devices you will neeed after surgery such as crutches or canes.  - If you have sleep apnea, please bring your CPAP machine  In the event that your physical condition changes including the onset of a cold or respiratory illness, or if you have to delay or cancel your surgery, please notify your surgeon.  Anesthesia: General Anesthesia  Youre due to have surgery. During surgery, youll be given medication called anesthesia. (It is also called anesthetic.) This will keep you comfortable and pain-free. Your surgeon will use general anesthesia. This sheet tells you more about it.    What Is General Anesthesia?  General anesthesia puts you into a state like deep sleep. It goes into the bloodstream (IV anesthetics), into the lungs (gas anesthetics), or both. You feel nothing during the procedure. You will not remember it. During the procedure, the anesthesia provider monitors you. He or she checks your heart rate and rhythm, blood pressure, and blood oxygen.  · IV Anesthetics  IV anesthetics are given through an IV line in your arm. Theyre often given first. This is so you are asleep before a gas anesthetic is started. Some kinds of IV anesthetics relieve pain. Others relax you. Your doctor will decide which kind is best in your case.  · Gas Anesthetics  Gas anesthetics are breathed into the lungs. They are often used to keep you asleep. They can be given through a facemask, an endotracheal tube, or a laryngeal mask airway.  ¨ If you have a facemask, your anesthesia provider will most likely place it over your nose and mouth while youre still awake. Youll breathe oxygen through the mask as your IV anesthetic is started. Gas anesthetic may be added through the mask.  ¨ If you have an endotracheal tube or a laryngeal mask airway, it will be  inserted into your throat after youre asleep.  Anesthesia Tools and Medications  You will likely have:  · IV anesthetics sent through an IV line into your bloodstream.  · Gas anesthetics breathed into your lungs, where they pass into your bloodstream.  · A pulse oximeter on the end of your finger. This measures your blood oxygen level.  · Electrocardiography leads (electrodes) on your chest. These record your heart rate and rhythm.  · A blood pressure cuff. This reads your blood pressure.  Risks and Possible Complications  General anesthesia has some risks. These include:  · Breathing problems  · Nausea and vomiting  · Sore throat or hoarseness  · Allergic reaction to the anesthetic  · Ongoing numbness (rare)  · Irregular heartbeat (rare)  · Cardiac arrest (rare)   Anesthesia Safety  · Follow all instructions you are given for how long not to eat or drink before your procedure.  · Be sure your doctor knows what medications you take. This includes over-the-counter medications, herbs, and supplements.  · Have an adult family member or friend drive you home after the procedure.  · For the first 24 hours after your surgery:  ¨ Do not drive or use heavy equipment.  ¨ Do not make important decisions or sign documents.  ¨ Avoid alcohol.  ¨ Have someone stay with you, if possible. They can watch for problems and help keep you safe.  © 8515-0547 [facility], [address] [place] [address], [place] [address]. All rights reserved. This information is not intended as a substitute for professional medical care. Always follow your healthcare professional's instructions.      Admission Information     Date & Time Provider Department CSN    5/17/2017  3:00 PM Sherri Ansari MD [facility] [id number]      Care Providers     Provider Role Specialty Primary office phone    Sherri Ansari MD Attending Provider Anesthesiology [phone]      Your Vitals Were     Temp Resp Height Weight SpO2 BMI    98 °F (36.7 °C)  "20 5' 2" (1.575 m) 117.3 kg (258 lb 9.6 oz) 97% 47.3 kg/m2      Recent Lab Values        7/5/2011 2/18/2014 3/27/2014 11/1/2016 5/17/2017              12:40 PM  2:45 PM  8:38 AM  3:55 PM 11:14 AM       A1C 8.1 (H) 7.3 (H) 7.5 (H) 6.8 (H) 6.7 (H)       Comment for A1C at  3:55 PM on 11/1/2016:  According to ADA guidelines, hemoglobin A1C <7.0% represents  optimal control in non-pregnant diabetic patients.  Different  metrics may apply to specific populations.   Standards of Medical Care in Diabetes - 2016.  For the purpose of screening for the presence of diabetes:  <5.7%     Consistent with the absence of diabetes  5.7-6.4%  Consistent with increasing risk for diabetes   (prediabetes)  >or=6.5%  Consistent with diabetes  Currently no consensus exists for use of hemoglobin A1C  for diagnosis of diabetes for children.      Comment for A1C at 11:14 AM on 5/17/2017:  According to ADA guidelines, hemoglobin A1C <7.0% represents  optimal control in non-pregnant diabetic patients.  Different  metrics may apply to specific populations.   Standards of Medical Care in Diabetes - 2016.  For the purpose of screening for the presence of diabetes:  <5.7%     Consistent with the absence of diabetes  5.7-6.4%  Consistent with increasing risk for diabetes   (prediabetes)  >or=6.5%  Consistent with diabetes  Currently no consensus exists for use of hemoglobin A1C  for diagnosis of diabetes for children.        Allergies as of 5/17/2017        Reactions    Iodinated Contrast Media - Oral And Iv Dye Hives, Rash      Advance Directives     An advance directive is a document which, in the event you are no longer able to make decisions for yourself, tells your healthcare team what kind of treatment you do or do not want to receive, or who you would like to make those decisions for you.  If you do not currently have an advance directive, [facility] encourages you to create one.  For more information call:  [phone] ([phone]), " [phone] ([phone]),  or log on to [email or web address].        Smoking Cessation     If you would like to quit smoking:   You may be eligible for free services if you are a [place] resident and started smoking cigarettes before September 1, 1988.  Call the Smoking Cessation Trust (SCT) toll free at (098) 252-6829 or (201) 339-4945.   Call 7-800-QUIT-NOW if you do not meet the above criteria.   Contact us via email: [email or web address]   View our website for more information: [email or web address]        Language Assistance Services     ATTENTION: Language assistance services are available, free of charge. Please call [phone].      ATENCIÓN: Si habla español, tiene a holley disposición servicios gratuitos de asistencia lingüística. Llame al [phone].     CHÚ Ý: N?u b?n nói Ti?ng Vi?t, có các d?ch v? h? tr? ngôn ng? mi?n phí dành cho b?n. G?i s? [phone].        Diabetes Discharge Instructions                                   [facility] Sign-Up     Activating your [facility] account is as easy as 1-2-3!     1) Visit [email or web address], select Sign Up Now, enter this activation code and your date of birth, then select Next.  [email or web address]  Expires: 7/1/2017 12:45 PM      2) Create a username and password to use when you visit [facility] in the future and select a security question in case you lose your password and select Next.    3) Enter your e-mail address and click Sign Up!    Additional Information  If you have questions, please e-mail [email or web address] or call [phone] to talk to our [facility] staff. Remember, [facility] is NOT to be used for urgent needs. For medical emergencies, dial 911.          [facility] complies with applicable Federal civil rights laws and does not discriminate on the basis of race, color, national origin, age, disability, or sex.

## 2017-05-17 NOTE — MR AVS SNAPSHOT
[facility] - Pre Op Consult  [address] [address]  Phone: [phone]                  Isabel Dennis   2017 4:00 PM   Initial consult    Description:  Female : 1955   Provider:  Cindi Link MD   Department:  [facility] - Pre Op Consult           Reason for Visit     Pre-op Exam           Diagnoses this Visit        Comments    Preop examination    -  Primary     Type 2 diabetes mellitus without complication, without long-term current use of insulin         Gastroesophageal reflux disease, esophagitis presence not specified         Chronic, continuous use of opioids         Patient is [employer]         Constipation, unspecified constipation type         Thrombocytopenia         Tattoos         Morbid obesity, unspecified obesity type         Edema, unspecified type                To Do List           Future Appointments        Provider Department Dept Phone    2017 1:30 PM [facility] [phone]    2017 10:30 AM Sharda Sandhu PA-C [facility] [phone]    2017 1:30 PM [facility] [phone]    2017 2:15 PM [facility] [phone]    2017 9:30 AM Sadia De La Rosa MD [facility] - Internal Medicine [phone]      Your Future Surgeries/Procedures     May 31, 2017   Surgery with [facility] MD   [facility] ([facility])    [address] [address] [phone]              Goals (5 Years of Data)     None      [facility]     [facility] Care Line - 24/ Assistance  Unless otherwise directed by your provider, please contact [facility], our nurse care line that is available for  assistance.     Registered nurses in the [facility] provide: appointment scheduling, clinical advisement,  health education, and other advisory services.  Call: [phone] (toll free)               Medications           Message regarding Medications     Verify the changes and/or additions to your medication regime listed below are the same as discussed with your clinician today.  If any of these changes or additions are incorrect, please notify your healthcare provider.        STOP taking these medications     diazePAM (VALIUM) 10 MG Tab     simvastatin (ZOCOR) 20 MG tablet Take 1 tablet (20 mg total) by mouth every evening. For Cholesterol.           Verify that the below list of medications is an accurate representation of the medications you are currently taking.  If none reported, the list may be blank. If incorrect, please contact your healthcare provider. Carry this list with you in case of emergency.           Current Medications     AMITIZA 24 mcg Cap Take by mouth as needed.     amitriptyline (ELAVIL) 50 MG tablet Take 50 mg by mouth 2 (two) times daily as needed.     carisoprodol (SOMA) 350 MG tablet Take 350 mg by mouth 3 (three) times daily.      cod liver oil Oil Take by mouth Daily.     ergocalciferol (VITAMIN D2) 50,000 unit Cap Take 1 capsule (50,000 Units total) by mouth twice a week.    esomeprazole (NEXIUM) 20 MG capsule Take 20 mg by mouth as needed.     fish oil-omega-3 fatty acids 300-1,000 mg capsule Take 2 g by mouth once daily.    FLAXSEED OIL ORAL Take 1,000 mg by mouth once daily. Pt states takes 4 daily, total of 4000 mg daily    fluticasone (FLONASE) 50 mcg/actuation nasal spray 2 sprays by Each Nare route once daily.    FREESTYLE LITE STRIPS Strp     ibuprofen (ADVIL,MOTRIN) 800 MG tablet Take 800 mg by mouth 2 (two) times daily.     naproxen (NAPROSYN) 500 MG tablet     NORCO  mg Tab Take 1 tablet by mouth 4 (four) times daily.     oxyMORphone (OPANA ER) 20 MG 12 hr tablet Take 30 mg by mouth Daily.     potassium chloride SA (K-DUR,KLOR-CON) 20 MEQ tablet Take 20 mEq by mouth once  "daily.     b complex vitamins tablet Take 1 tablet by mouth once daily.    CINNAMON BARK (CINNAMON ORAL) Take by mouth Daily.    PSYLLIUM HUSK (METAMUCIL ORAL) Take by mouth Daily.           Clinical Reference Information           Your Vitals Were     BP Pulse Temp Height Weight SpO2    138/84 84 98 °F (36.7 °C) 5' 2" (1.575 m) 117.3 kg (258 lb 9.6 oz) 97%    BMI                47.3 kg/m2          Blood Pressure          Most Recent Value    BP  138/84      Allergies as of 5/17/2017     Iodinated Contrast Media - Oral And Iv Dye      Immunizations Administered on Date of Encounter - 5/17/2017     None      Orders Placed During Today's Visit     Future Labs/Procedures Expected by Expires    CBC auto differential  5/17/2017 7/16/2018      [facility] Sign-Up     Activating your [facility] account is as easy as 1-2-3!     1) Visit [email or web address], select Sign Up Now, enter this activation code and your date of birth, then select Next.  [email or web address]  Expires: 7/1/2017 12:45 PM      2) Create a username and password to use when you visit [facility] in the future and select a security question in case you lose your password and select Next.    3) Enter your e-mail address and click Sign Up!    Additional Information  If you have questions, please e-mail [email or web address] or call [phone] to talk to our [facility] staff. Remember, [facility] is NOT to be used for urgent needs. For medical emergencies, dial 911.         Instructions    Verified PreOp Instructions given:    -- Medication information (what to hold and what to take)   -- NPO guidelines -- DO NOT EAT ANYTHING AFTER MIDNIGHT, INCLUDING GUM, HARD CANDY, MINTS, OR CHEWING TOBACCO.  -- Arrival place and directions given; time to be given the day before procedure by the Surgeon's Office   -- Bathing with antibacterial soap   -- Don't wear any jewelry or bring any valuables AM of surgery   -- No makeup or moisturizer to face   -- No perfume/cologne, powder, " lotions or aftershave     Pt verbalized understanding.        Language Assistance Services     ATTENTION: Language assistance services are available, free of charge. Please call [phone].      ATENCIÓN: Si habla ej, tiene a holley disposición servicios gratuitos de asistencia lingüística. Llame al [phone].     CHÚ Ý: N?u b?n nói Ti?ng Vi?t, có các d?ch v? h? tr? ngôn ng? mi?n phí dành cho b?n. G?i s? [phone].         [facility] complies with applicable Federal civil rights laws and does not discriminate on the basis of race, color, national origin, age, disability, or sex.

## 2017-05-18 RX ORDER — OXYCODONE HYDROCHLORIDE 5 MG/1
5 TABLET ORAL
Status: CANCELLED | OUTPATIENT
Start: 2017-05-18

## 2017-05-18 RX ORDER — BISACODYL 10 MG
10 SUPPOSITORY, RECTAL RECTAL EVERY 12 HOURS PRN
Status: CANCELLED | OUTPATIENT
Start: 2017-05-18

## 2017-05-18 RX ORDER — SODIUM CHLORIDE 0.9 % (FLUSH) 0.9 %
3 SYRINGE (ML) INJECTION EVERY 8 HOURS PRN
Status: CANCELLED | OUTPATIENT
Start: 2017-05-18

## 2017-05-18 RX ORDER — ACETAMINOPHEN 500 MG
1000 TABLET ORAL EVERY 6 HOURS
Status: CANCELLED | OUTPATIENT
Start: 2017-05-18 | End: 2017-05-20

## 2017-05-18 RX ORDER — AMOXICILLIN 250 MG
1 CAPSULE ORAL 2 TIMES DAILY
Status: CANCELLED | OUTPATIENT
Start: 2017-05-18

## 2017-05-18 RX ORDER — ONDANSETRON 2 MG/ML
4 INJECTION INTRAMUSCULAR; INTRAVENOUS EVERY 12 HOURS PRN
Status: CANCELLED | OUTPATIENT
Start: 2017-05-18

## 2017-05-18 RX ORDER — POLYETHYLENE GLYCOL 3350 17 G/17G
17 POWDER, FOR SOLUTION ORAL DAILY
Status: CANCELLED | OUTPATIENT
Start: 2017-05-18

## 2017-05-18 RX ORDER — SODIUM CHLORIDE 9 MG/ML
INJECTION, SOLUTION INTRAVENOUS
Status: CANCELLED | OUTPATIENT
Start: 2017-05-18

## 2017-05-18 RX ORDER — SODIUM CHLORIDE 9 MG/ML
INJECTION, SOLUTION INTRAVENOUS CONTINUOUS
Status: CANCELLED | OUTPATIENT
Start: 2017-05-18 | End: 2017-05-19

## 2017-05-18 RX ORDER — PREGABALIN 25 MG/1
150 CAPSULE ORAL
Status: CANCELLED | OUTPATIENT
Start: 2017-05-18

## 2017-05-18 RX ORDER — OXYCODONE HYDROCHLORIDE 5 MG/1
15 TABLET ORAL
Status: CANCELLED | OUTPATIENT
Start: 2017-05-18

## 2017-05-18 RX ORDER — MUPIROCIN 20 MG/G
1 OINTMENT TOPICAL
Status: CANCELLED | OUTPATIENT
Start: 2017-05-18

## 2017-05-18 RX ORDER — RAMELTEON 8 MG/1
8 TABLET ORAL NIGHTLY PRN
Status: CANCELLED | OUTPATIENT
Start: 2017-05-18

## 2017-05-18 RX ORDER — NALOXONE HCL 0.4 MG/ML
0.02 VIAL (ML) INJECTION
Status: CANCELLED | OUTPATIENT
Start: 2017-05-18

## 2017-05-18 RX ORDER — FAMOTIDINE 20 MG/1
20 TABLET, FILM COATED ORAL 2 TIMES DAILY
Status: CANCELLED | OUTPATIENT
Start: 2017-05-18

## 2017-05-18 RX ORDER — MIDAZOLAM HYDROCHLORIDE 5 MG/ML
1 INJECTION INTRAMUSCULAR; INTRAVENOUS EVERY 5 MIN PRN
Status: CANCELLED | OUTPATIENT
Start: 2017-05-18

## 2017-05-18 RX ORDER — OXYCODONE HYDROCHLORIDE 5 MG/1
10 TABLET ORAL
Status: CANCELLED | OUTPATIENT
Start: 2017-05-18

## 2017-05-18 RX ORDER — CELECOXIB 100 MG/1
400 CAPSULE ORAL
Status: CANCELLED | OUTPATIENT
Start: 2017-05-18

## 2017-05-18 RX ORDER — FENTANYL CITRATE 50 UG/ML
25 INJECTION, SOLUTION INTRAMUSCULAR; INTRAVENOUS EVERY 5 MIN PRN
Status: CANCELLED | OUTPATIENT
Start: 2017-05-18

## 2017-05-18 RX ORDER — LIDOCAINE HYDROCHLORIDE 10 MG/ML
1 INJECTION, SOLUTION EPIDURAL; INFILTRATION; INTRACAUDAL; PERINEURAL
Status: CANCELLED | OUTPATIENT
Start: 2017-05-18

## 2017-05-18 RX ORDER — ACETAMINOPHEN 10 MG/ML
1000 INJECTION, SOLUTION INTRAVENOUS ONCE
Status: CANCELLED | OUTPATIENT
Start: 2017-05-18 | End: 2017-05-18

## 2017-05-18 RX ORDER — MORPHINE SULFATE 10 MG/ML
2 INJECTION, SOLUTION INTRAMUSCULAR; INTRAVENOUS
Status: CANCELLED | OUTPATIENT
Start: 2017-05-18

## 2017-05-18 RX ORDER — PREGABALIN 25 MG/1
75 CAPSULE ORAL NIGHTLY
Status: CANCELLED | OUTPATIENT
Start: 2017-05-18

## 2017-05-18 RX ORDER — CELECOXIB 100 MG/1
200 CAPSULE ORAL DAILY
Status: CANCELLED | OUTPATIENT
Start: 2017-05-18

## 2017-05-18 RX ORDER — ASPIRIN 325 MG
325 TABLET, DELAYED RELEASE (ENTERIC COATED) ORAL 2 TIMES DAILY
Status: CANCELLED | OUTPATIENT
Start: 2017-05-18

## 2017-05-18 RX ORDER — ROPIVACAINE HYDROCHLORIDE 2 MG/ML
8 INJECTION, SOLUTION EPIDURAL; INFILTRATION; PERINEURAL CONTINUOUS
Status: CANCELLED | OUTPATIENT
Start: 2017-05-18

## 2017-05-18 NOTE — H&P
CC: Right knee pain    Isabel Dennis is a 62 y.o. female who has had pain in Right knee for several years. Pain is worse with activity and weight bearing.  Patient has experienced interference of activities of daily living due to decreased range of motion and an increase in joint pain and swelling.  Patient has failed non-operative treatment including NSAIDs, corticosteroid injections, viscosupplement injections, and activity modification. Physical therapy was contraindicated in this patient due to pain and potential bone loss on this severe arthritic joint. Isabel Dennis currently ambulates using assistive device.     Past Medical History:   Diagnosis Date    Allergy     Anxiety     Arthritis     DDD (degenerative disc disease), cervical     Depression     Diabetes mellitus     Diabetes mellitus, type 2     Diverticulosis     GERD (gastroesophageal reflux disease)     Hx of colonic polyps     Lung disease     Other and unspecified hyperlipidemia     Post-traumatic osteoarthritis of both knees        Past Surgical History:   Procedure Laterality Date    ADENOIDECTOMY      COLONOSCOPY      epiglottis      4 times    HYSTERECTOMY      Partial, fibroids    KNEE SURGERY      NECK SURGERY      SPINE SURGERY      TONSILLECTOMY         Family History   Problem Relation Age of Onset    Cancer Father      throat     Hypertension Father     Heart disease Father     Cancer Brother     Heart failure Brother 62    Hypertension Mother     Arthritis Mother     Diabetes Paternal Aunt     Stroke Paternal Aunt     Breast cancer Neg Hx     Colon cancer Neg Hx     Ovarian cancer Neg Hx        Review of patient's allergies indicates:   Allergen Reactions    Iodinated contrast media - oral and iv dye Hives and Rash     As per history has problem if has IV extravasation         Current Outpatient Prescriptions:     AMITIZA 24 mcg Cap, Take by mouth as needed. , Disp: , Rfl:     amitriptyline (ELAVIL) 50  MG tablet, Take 50 mg by mouth 2 (two) times daily as needed. , Disp: , Rfl:     carisoprodol (SOMA) 350 MG tablet, Take 350 mg by mouth 3 (three) times daily.  , Disp: , Rfl:     cod liver oil Oil, Take by mouth Daily. , Disp: , Rfl:     ergocalciferol (VITAMIN D2) 50,000 unit Cap, Take 1 capsule (50,000 Units total) by mouth twice a week., Disp: 24 capsule, Rfl: 0    esomeprazole (NEXIUM) 20 MG capsule, Take 20 mg by mouth as needed. , Disp: , Rfl:     fish oil-omega-3 fatty acids 300-1,000 mg capsule, Take 2 g by mouth once daily., Disp: , Rfl:     FLAXSEED OIL ORAL, Take 1,000 mg by mouth once daily. Pt states takes 4 daily, total of 4000 mg daily, Disp: , Rfl:     fluticasone (FLONASE) 50 mcg/actuation nasal spray, 2 sprays by Each Nare route once daily. (Patient taking differently: 2 sprays by Each Nare route once daily. As needed), Disp: 1 Bottle, Rfl: 6    FREESTYLE LITE STRIPS Strp, , Disp: , Rfl:     ibuprofen (ADVIL,MOTRIN) 800 MG tablet, Take 800 mg by mouth 2 (two) times daily. , Disp: , Rfl:     naproxen (NAPROSYN) 500 MG tablet, , Disp: , Rfl:     NORCO  mg Tab, Take 1 tablet by mouth 4 (four) times daily. , Disp: , Rfl:     potassium chloride SA (K-DUR,KLOR-CON) 20 MEQ tablet, Take 20 mEq by mouth once daily. , Disp: , Rfl:     b complex vitamins tablet, Take 1 tablet by mouth once daily., Disp: , Rfl:     CINNAMON BARK (CINNAMON ORAL), Take by mouth Daily., Disp: , Rfl:     oxyMORphone (OPANA ER) 20 MG 12 hr tablet, Take 30 mg by mouth Daily. , Disp: , Rfl:     PSYLLIUM HUSK (METAMUCIL ORAL), Take by mouth Daily., Disp: , Rfl:     Review of Systems:  Constitutional: no fever or chills  Eyes: no visual changes  ENT: no nasal congestion or sore throat  Respiratory: no cough or shortness of breath  Cardiovascular: no chest pain or palpitations  Gastrointestinal: no nausea or vomiting, tolerating diet  Genitourinary: no hematuria or dysuria  Integument/Breast: no rash or  "pruritis  Hematologic/Lymphatic: no easy bruising or lymphadenopathy  Musculoskeletal: positive for knee pain  Neurological: no seizures or tremors  Behavioral/Psych: no auditory or visual hallucinations  Endocrine: no heat or cold intolerance    PE:  Ht 5' 2" (1.575 m)  Wt 113 kg (249 lb 1.9 oz)  BMI 45.56 kg/m2  General: Pleasant, cooperative, NAD   Gait: antalgic  HEENT: NCAT, sclera nonicteric   Lungs: Respirations clear bilaterally; equal and unlabored.   CV: S1S2; 2+ bilateral upper and lower extremity pulses.   Skin: Intact throughout with no rashes, erythema, or lesions  Extremities: No LE edema,  no erythema or warmth of the skin in either lower extremity.    Right knee exam:  Knee Range of Motion: active, pain with passive range of motion  Effusion:none  Condition of skin:intact  Location of tenderness:Medial joint line   Strength:5 of 5 quadriceps strength and 5 of 5 hamstring strength  Stability: stable to testing    Hip Examination:full painless range of motion, without tenderness    Radiographs: Radiographs reveal advanced degenerative changes including subchondral cyst formation, subchondral sclerosis, osteophyte formation, joint space narrowing.     Knee Alignment:  Moderate varus    Diagnosis: osteoarthritis Right knee    Plan: Right total knee arthroplasty    Due to the serious nature of total joint infection and the high prevalence of community acquired MRSA, vancomycin will be used perioperatively.        "

## 2017-05-18 NOTE — PROGRESS NOTES
Isabel Dennis is a 62 y.o. year old here today for a pre-operative visit in preparation for a Right total knee arthroplasty to be performed by  Dr. [facility] on 5/31/2017.  she was last seen and treated in the clinic on 4/25/2017. she will be medically optimized by the pre op center. There has been no significant change in medical status since last visit. No fever, chills, malaise, or unexplained weight change.      Allergies, Medications, past medical and surgical history reviewed.    Focused examination performed.    Dr. [facility] saw this patient today in clinic. All questions answered. Patient encouraged to call with questions. Contact information given.     Pre, nieves, and post operative procedures and expectations discussed. Questions were answered. Isabel Dennis has been educated and is ready to proceed with surgery. Approximately 30 minutes was spent discussing surgical outcomes, plans, procedures pre, nieves, and post operative expections and care.  Surgical consent signed.    Isabel Dennis will contact us if there are any questions, concerns, or changes in medical status prior to surgery.

## 2017-05-26 ENCOUNTER — TELEPHONE (OUTPATIENT)
Dept: ORTHOPEDICS | Facility: CLINIC | Age: 62
End: 2017-05-26

## 2017-05-26 NOTE — TELEPHONE ENCOUNTER
----- Message from Joey Telles sent at 5/26/2017  2:57 PM CDT -----  Contact: self  Pt request a call regarding her knee pain.

## 2017-05-30 ENCOUNTER — TELEPHONE (OUTPATIENT)
Dept: ORTHOPEDICS | Facility: CLINIC | Age: 62
End: 2017-05-30

## 2017-05-30 NOTE — TELEPHONE ENCOUNTER
we spoke : Reminded to eat light the night before surgery, NPO after midnight, take hibclens shower the night before surgery  & again in the am , sleep on clean sheets  in clean clothes, no laxatives or stool softeners , report to the 2nd floor surgery unit for 7:30 am tomorrow when I first called her   she wanted to cx her surgery, Dr [facility] spoke with  her & all is well

## 2017-05-31 ENCOUNTER — ANESTHESIA (OUTPATIENT)
Dept: SURGERY | Facility: HOSPITAL | Age: 62
DRG: 470 | End: 2017-05-31
Payer: MEDICARE

## 2017-05-31 ENCOUNTER — HOSPITAL ENCOUNTER (INPATIENT)
Facility: HOSPITAL | Age: 62
LOS: 1 days | Discharge: NURSING, REHAB OR LONG-TERM CARE | DRG: 470 | End: 2017-06-01
Attending: ORTHOPAEDIC SURGERY | Admitting: ORTHOPAEDIC SURGERY
Payer: MEDICARE

## 2017-05-31 ENCOUNTER — SURGERY (OUTPATIENT)
Age: 62
End: 2017-05-31

## 2017-05-31 DIAGNOSIS — Z96.651 STATUS POST TOTAL RIGHT KNEE REPLACEMENT: Primary | ICD-10-CM

## 2017-05-31 DIAGNOSIS — M17.11 PRIMARY OSTEOARTHRITIS OF RIGHT KNEE: ICD-10-CM

## 2017-05-31 LAB
ANION GAP SERPL CALC-SCNC: 11 MMOL/L
BASOPHILS # BLD AUTO: 0.06 K/UL
BASOPHILS NFR BLD: 1.4 %
BUN SERPL-MCNC: 15 MG/DL
CALCIUM SERPL-MCNC: 9.1 MG/DL
CHLORIDE SERPL-SCNC: 106 MMOL/L
CO2 SERPL-SCNC: 24 MMOL/L
CREAT SERPL-MCNC: 0.8 MG/DL
DIFFERENTIAL METHOD: ABNORMAL
EOSINOPHIL # BLD AUTO: 0.1 K/UL
EOSINOPHIL NFR BLD: 1.6 %
ERYTHROCYTE [DISTWIDTH] IN BLOOD BY AUTOMATED COUNT: 14.4 %
EST. GFR  (AFRICAN AMERICAN): >60 ML/MIN/1.73 M^2
EST. GFR  (NON AFRICAN AMERICAN): >60 ML/MIN/1.73 M^2
GLUCOSE SERPL-MCNC: 125 MG/DL
HCT VFR BLD AUTO: 41.4 %
HGB BLD-MCNC: 12.8 G/DL
LYMPHOCYTES # BLD AUTO: 2.5 K/UL
LYMPHOCYTES NFR BLD: 58.8 %
MCH RBC QN AUTO: 26.7 PG
MCHC RBC AUTO-ENTMCNC: 30.9 %
MCV RBC AUTO: 86 FL
MONOCYTES # BLD AUTO: 0.2 K/UL
MONOCYTES NFR BLD: 5.3 %
NEUTROPHILS # BLD AUTO: 1.4 K/UL
NEUTROPHILS NFR BLD: 32.9 %
PLATELET # BLD AUTO: 266 K/UL
PMV BLD AUTO: 9.4 FL
POCT GLUCOSE: 114 MG/DL (ref 70–110)
POCT GLUCOSE: 143 MG/DL (ref 70–110)
POTASSIUM SERPL-SCNC: 4.7 MMOL/L
RBC # BLD AUTO: 4.79 M/UL
SODIUM SERPL-SCNC: 141 MMOL/L
WBC # BLD AUTO: 4.3 K/UL

## 2017-05-31 PROCEDURE — 11000001 HC ACUTE MED/SURG PRIVATE ROOM

## 2017-05-31 PROCEDURE — 0SRC0J9 REPLACEMENT OF RIGHT KNEE JOINT WITH SYNTHETIC SUBSTITUTE, CEMENTED, OPEN APPROACH: ICD-10-PCS | Performed by: ORTHOPAEDIC SURGERY

## 2017-05-31 PROCEDURE — 97162 PT EVAL MOD COMPLEX 30 MIN: CPT

## 2017-05-31 PROCEDURE — 88311 DECALCIFY TISSUE: CPT | Mod: 26,,, | Performed by: PATHOLOGY

## 2017-05-31 PROCEDURE — 88305 TISSUE EXAM BY PATHOLOGIST: CPT | Performed by: PATHOLOGY

## 2017-05-31 PROCEDURE — 27200665 HC NERVE BLOCK NEEDLE/ CATHETER: Performed by: ANESTHESIOLOGY

## 2017-05-31 PROCEDURE — 82962 GLUCOSE BLOOD TEST: CPT | Performed by: ORTHOPAEDIC SURGERY

## 2017-05-31 PROCEDURE — 64448 NJX AA&/STRD FEM NRV NFS IMG: CPT | Mod: 59,RT,, | Performed by: ANESTHESIOLOGY

## 2017-05-31 PROCEDURE — 25000003 PHARM REV CODE 250: Performed by: NURSE ANESTHETIST, CERTIFIED REGISTERED

## 2017-05-31 PROCEDURE — 63600175 PHARM REV CODE 636 W HCPCS: Performed by: NURSE ANESTHETIST, CERTIFIED REGISTERED

## 2017-05-31 PROCEDURE — 27447 TOTAL KNEE ARTHROPLASTY: CPT | Mod: 82,AS,RT, | Performed by: PHYSICIAN ASSISTANT

## 2017-05-31 PROCEDURE — 63600175 PHARM REV CODE 636 W HCPCS

## 2017-05-31 PROCEDURE — 27200688 HC TRAY, SPINAL-HYPER/ ISOBARIC: Performed by: ANESTHESIOLOGY

## 2017-05-31 PROCEDURE — 25000003 PHARM REV CODE 250: Performed by: STUDENT IN AN ORGANIZED HEALTH CARE EDUCATION/TRAINING PROGRAM

## 2017-05-31 PROCEDURE — 85025 COMPLETE CBC W/AUTO DIFF WBC: CPT

## 2017-05-31 PROCEDURE — 99900035 HC TECH TIME PER 15 MIN (STAT)

## 2017-05-31 PROCEDURE — 37000009 HC ANESTHESIA EA ADD 15 MINS: Performed by: ORTHOPAEDIC SURGERY

## 2017-05-31 PROCEDURE — 37000008 HC ANESTHESIA 1ST 15 MINUTES: Performed by: ORTHOPAEDIC SURGERY

## 2017-05-31 PROCEDURE — 63600175 PHARM REV CODE 636 W HCPCS: Performed by: PHYSICIAN ASSISTANT

## 2017-05-31 PROCEDURE — 36000710: Performed by: ORTHOPAEDIC SURGERY

## 2017-05-31 PROCEDURE — 97165 OT EVAL LOW COMPLEX 30 MIN: CPT

## 2017-05-31 PROCEDURE — 27201423 OPTIME MED/SURG SUP & DEVICES STERILE SUPPLY: Performed by: ORTHOPAEDIC SURGERY

## 2017-05-31 PROCEDURE — 27000221 HC OXYGEN, UP TO 24 HOURS

## 2017-05-31 PROCEDURE — 27800517 HC TRAY,EPIDURAL-CONTINUOUS: Performed by: ANESTHESIOLOGY

## 2017-05-31 PROCEDURE — 25000003 PHARM REV CODE 250: Performed by: PHYSICIAN ASSISTANT

## 2017-05-31 PROCEDURE — C1776 JOINT DEVICE (IMPLANTABLE): HCPCS | Performed by: ORTHOPAEDIC SURGERY

## 2017-05-31 PROCEDURE — 71000033 HC RECOVERY, INTIAL HOUR: Performed by: ORTHOPAEDIC SURGERY

## 2017-05-31 PROCEDURE — D9220A PRA ANESTHESIA: Mod: CRNA,,, | Performed by: NURSE ANESTHETIST, CERTIFIED REGISTERED

## 2017-05-31 PROCEDURE — 94799 UNLISTED PULMONARY SVC/PX: CPT

## 2017-05-31 PROCEDURE — 94760 N-INVAS EAR/PLS OXIMETRY 1: CPT

## 2017-05-31 PROCEDURE — 80048 BASIC METABOLIC PNL TOTAL CA: CPT

## 2017-05-31 PROCEDURE — 25000003 PHARM REV CODE 250: Performed by: ANESTHESIOLOGY

## 2017-05-31 PROCEDURE — 36000711: Performed by: ORTHOPAEDIC SURGERY

## 2017-05-31 PROCEDURE — D9220A PRA ANESTHESIA: Mod: ANES,,, | Performed by: ANESTHESIOLOGY

## 2017-05-31 PROCEDURE — 64450 NJX AA&/STRD OTHER PN/BRANCH: CPT | Mod: 59,RT,, | Performed by: ANESTHESIOLOGY

## 2017-05-31 PROCEDURE — 63600175 PHARM REV CODE 636 W HCPCS: Performed by: ORTHOPAEDIC SURGERY

## 2017-05-31 PROCEDURE — C1769 GUIDE WIRE: HCPCS | Performed by: ORTHOPAEDIC SURGERY

## 2017-05-31 PROCEDURE — 94770 HC EXHALED C02 TEST: CPT

## 2017-05-31 PROCEDURE — C1713 ANCHOR/SCREW BN/BN,TIS/BN: HCPCS | Performed by: ORTHOPAEDIC SURGERY

## 2017-05-31 PROCEDURE — 27447 TOTAL KNEE ARTHROPLASTY: CPT | Mod: RT,,, | Performed by: ORTHOPAEDIC SURGERY

## 2017-05-31 PROCEDURE — 76942 ECHO GUIDE FOR BIOPSY: CPT | Performed by: ANESTHESIOLOGY

## 2017-05-31 PROCEDURE — 71000039 HC RECOVERY, EACH ADD'L HOUR: Performed by: ORTHOPAEDIC SURGERY

## 2017-05-31 DEVICE — IMPLANTABLE DEVICE: Type: IMPLANTABLE DEVICE | Site: TIBIA | Status: FUNCTIONAL

## 2017-05-31 DEVICE — TIBIA BASEPLT SZ E 5 DEG R CEM: Type: IMPLANTABLE DEVICE | Site: TIBIA | Status: FUNCTIONAL

## 2017-05-31 DEVICE — CEMENT BONE SIMPLE P INDIVID: Type: IMPLANTABLE DEVICE | Site: KNEE | Status: FUNCTIONAL

## 2017-05-31 DEVICE — PATELLA PSN CEM POLY 8X29MM: Type: IMPLANTABLE DEVICE | Site: PATELLA | Status: FUNCTIONAL

## 2017-05-31 DEVICE — PLUG BONE #5: Type: IMPLANTABLE DEVICE | Site: FEMUR | Status: FUNCTIONAL

## 2017-05-31 DEVICE — FEMUR PSN PS CMT SZ6 R CCR STD: Type: IMPLANTABLE DEVICE | Site: FEMUR | Status: FUNCTIONAL

## 2017-05-31 RX ORDER — LORAZEPAM 2 MG/ML
0.25 INJECTION INTRAMUSCULAR EVERY 10 MIN PRN
Status: DISCONTINUED | OUTPATIENT
Start: 2017-05-31 | End: 2017-05-31 | Stop reason: HOSPADM

## 2017-05-31 RX ORDER — AMOXICILLIN 250 MG
1 CAPSULE ORAL 2 TIMES DAILY
Status: DISCONTINUED | OUTPATIENT
Start: 2017-05-31 | End: 2017-06-01 | Stop reason: HOSPADM

## 2017-05-31 RX ORDER — MORPHINE SULFATE 2 MG/ML
2 INJECTION, SOLUTION INTRAMUSCULAR; INTRAVENOUS
Status: DISCONTINUED | OUTPATIENT
Start: 2017-05-31 | End: 2017-06-01 | Stop reason: HOSPADM

## 2017-05-31 RX ORDER — ASPIRIN 325 MG
325 TABLET ORAL 2 TIMES DAILY
Qty: 60 TABLET | Refills: 0 | Status: SHIPPED | OUTPATIENT
Start: 2017-05-31 | End: 2017-06-30

## 2017-05-31 RX ORDER — LORAZEPAM 2 MG/ML
0.5 INJECTION INTRAMUSCULAR EVERY 8 HOURS PRN
Status: DISCONTINUED | OUTPATIENT
Start: 2017-05-31 | End: 2017-06-01 | Stop reason: HOSPADM

## 2017-05-31 RX ORDER — AMITRIPTYLINE HYDROCHLORIDE 50 MG/1
50 TABLET, FILM COATED ORAL 2 TIMES DAILY PRN
Status: DISCONTINUED | OUTPATIENT
Start: 2017-05-31 | End: 2017-06-01 | Stop reason: HOSPADM

## 2017-05-31 RX ORDER — OXYCODONE HYDROCHLORIDE 5 MG/1
10 TABLET ORAL
Status: DISCONTINUED | OUTPATIENT
Start: 2017-05-31 | End: 2017-06-01 | Stop reason: HOSPADM

## 2017-05-31 RX ORDER — NALOXONE HCL 0.4 MG/ML
0.02 VIAL (ML) INJECTION
Status: DISCONTINUED | OUTPATIENT
Start: 2017-05-31 | End: 2017-05-31

## 2017-05-31 RX ORDER — PREGABALIN 150 MG/1
150 CAPSULE ORAL
Status: DISCONTINUED | OUTPATIENT
Start: 2017-05-31 | End: 2017-05-31 | Stop reason: HOSPADM

## 2017-05-31 RX ORDER — LORAZEPAM 2 MG/ML
INJECTION INTRAMUSCULAR
Status: COMPLETED
Start: 2017-05-31 | End: 2017-05-31

## 2017-05-31 RX ORDER — MORPHINE SULFATE 15 MG/1
15 TABLET ORAL 2 TIMES DAILY
Status: DISCONTINUED | OUTPATIENT
Start: 2017-05-31 | End: 2017-06-01

## 2017-05-31 RX ORDER — MUPIROCIN 20 MG/G
1 OINTMENT TOPICAL
Status: COMPLETED | OUTPATIENT
Start: 2017-05-31 | End: 2017-05-31

## 2017-05-31 RX ORDER — LIDOCAINE HYDROCHLORIDE 10 MG/ML
INJECTION, SOLUTION INTRAVENOUS
Status: DISCONTINUED | OUTPATIENT
Start: 2017-05-31 | End: 2017-05-31

## 2017-05-31 RX ORDER — CEFAZOLIN SODIUM 2 G/50ML
2 SOLUTION INTRAVENOUS
Status: DISCONTINUED | OUTPATIENT
Start: 2017-05-31 | End: 2017-05-31

## 2017-05-31 RX ORDER — OXYCODONE HYDROCHLORIDE 5 MG/1
10 TABLET ORAL
Status: DISCONTINUED | OUTPATIENT
Start: 2017-05-31 | End: 2017-05-31

## 2017-05-31 RX ORDER — MIDAZOLAM HYDROCHLORIDE 1 MG/ML
INJECTION, SOLUTION INTRAMUSCULAR; INTRAVENOUS
Status: DISCONTINUED | OUTPATIENT
Start: 2017-05-31 | End: 2017-05-31

## 2017-05-31 RX ORDER — FENTANYL CITRATE 50 UG/ML
25 INJECTION, SOLUTION INTRAMUSCULAR; INTRAVENOUS EVERY 5 MIN PRN
Status: COMPLETED | OUTPATIENT
Start: 2017-05-31 | End: 2017-05-31

## 2017-05-31 RX ORDER — ROPIVACAINE HYDROCHLORIDE 2 MG/ML
INJECTION, SOLUTION EPIDURAL; INFILTRATION; PERINEURAL
Status: DISPENSED
Start: 2017-05-31 | End: 2017-06-01

## 2017-05-31 RX ORDER — ACETAMINOPHEN 500 MG
1000 TABLET ORAL EVERY 6 HOURS
Status: DISCONTINUED | OUTPATIENT
Start: 2017-05-31 | End: 2017-06-01 | Stop reason: HOSPADM

## 2017-05-31 RX ORDER — CARISOPRODOL 350 MG/1
350 TABLET ORAL 3 TIMES DAILY
Status: DISCONTINUED | OUTPATIENT
Start: 2017-05-31 | End: 2017-06-01 | Stop reason: HOSPADM

## 2017-05-31 RX ORDER — ACETAMINOPHEN 10 MG/ML
1000 INJECTION, SOLUTION INTRAVENOUS ONCE
Status: COMPLETED | OUTPATIENT
Start: 2017-05-31 | End: 2017-05-31

## 2017-05-31 RX ORDER — LIDOCAINE HYDROCHLORIDE 10 MG/ML
1 INJECTION, SOLUTION EPIDURAL; INFILTRATION; INTRACAUDAL; PERINEURAL
Status: COMPLETED | OUTPATIENT
Start: 2017-05-31 | End: 2017-05-31

## 2017-05-31 RX ORDER — PHENYLEPHRINE HYDROCHLORIDE 10 MG/ML
INJECTION INTRAVENOUS
Status: DISCONTINUED | OUTPATIENT
Start: 2017-05-31 | End: 2017-05-31

## 2017-05-31 RX ORDER — HALOPERIDOL 5 MG/ML
2 INJECTION INTRAMUSCULAR EVERY 30 MIN PRN
Status: DISCONTINUED | OUTPATIENT
Start: 2017-05-31 | End: 2017-06-01 | Stop reason: HOSPADM

## 2017-05-31 RX ORDER — CELECOXIB 200 MG/1
400 CAPSULE ORAL
Status: COMPLETED | OUTPATIENT
Start: 2017-05-31 | End: 2017-05-31

## 2017-05-31 RX ORDER — CEFAZOLIN SODIUM 2 G/50ML
2 SOLUTION INTRAVENOUS
Status: COMPLETED | OUTPATIENT
Start: 2017-05-31 | End: 2017-06-01

## 2017-05-31 RX ORDER — AMITRIPTYLINE HYDROCHLORIDE 50 MG/1
50 TABLET, FILM COATED ORAL 2 TIMES DAILY PRN
Status: DISCONTINUED | OUTPATIENT
Start: 2017-05-31 | End: 2017-05-31

## 2017-05-31 RX ORDER — HALOPERIDOL 5 MG/ML
INJECTION INTRAMUSCULAR
Status: COMPLETED
Start: 2017-05-31 | End: 2017-05-31

## 2017-05-31 RX ORDER — OXYCODONE AND ACETAMINOPHEN 10; 325 MG/1; MG/1
1 TABLET ORAL
Qty: 90 TABLET | Refills: 0 | Status: ON HOLD | OUTPATIENT
Start: 2017-05-31 | End: 2017-06-05 | Stop reason: HOSPADM

## 2017-05-31 RX ORDER — BISACODYL 10 MG
10 SUPPOSITORY, RECTAL RECTAL EVERY 12 HOURS PRN
Status: DISCONTINUED | OUTPATIENT
Start: 2017-05-31 | End: 2017-06-01 | Stop reason: HOSPADM

## 2017-05-31 RX ORDER — ONDANSETRON 2 MG/ML
4 INJECTION INTRAMUSCULAR; INTRAVENOUS EVERY 8 HOURS PRN
Status: DISCONTINUED | OUTPATIENT
Start: 2017-05-31 | End: 2017-06-01 | Stop reason: HOSPADM

## 2017-05-31 RX ORDER — KETAMINE HYDROCHLORIDE 100 MG/ML
INJECTION, SOLUTION INTRAMUSCULAR; INTRAVENOUS
Status: DISCONTINUED | OUTPATIENT
Start: 2017-05-31 | End: 2017-05-31

## 2017-05-31 RX ORDER — OXYCODONE HYDROCHLORIDE 5 MG/1
5 TABLET ORAL
Status: DISCONTINUED | OUTPATIENT
Start: 2017-05-31 | End: 2017-05-31

## 2017-05-31 RX ORDER — LIDOCAINE HYDROCHLORIDE AND EPINEPHRINE 15; 5 MG/ML; UG/ML
INJECTION, SOLUTION EPIDURAL
Status: DISPENSED
Start: 2017-05-31 | End: 2017-06-01

## 2017-05-31 RX ORDER — PROPOFOL 10 MG/ML
VIAL (ML) INTRAVENOUS
Status: DISCONTINUED | OUTPATIENT
Start: 2017-05-31 | End: 2017-05-31

## 2017-05-31 RX ORDER — GENTAMICIN SULFATE 40 MG/ML
INJECTION, SOLUTION INTRAMUSCULAR; INTRAVENOUS
Status: DISCONTINUED | OUTPATIENT
Start: 2017-05-31 | End: 2017-05-31 | Stop reason: HOSPADM

## 2017-05-31 RX ORDER — ASPIRIN 325 MG
325 TABLET, DELAYED RELEASE (ENTERIC COATED) ORAL 2 TIMES DAILY
Status: DISCONTINUED | OUTPATIENT
Start: 2017-05-31 | End: 2017-06-01 | Stop reason: HOSPADM

## 2017-05-31 RX ORDER — PROMETHAZINE HYDROCHLORIDE 25 MG/1
25 TABLET ORAL EVERY 6 HOURS PRN
Qty: 30 TABLET | Refills: 0 | Status: ON HOLD | OUTPATIENT
Start: 2017-05-31 | End: 2017-06-05 | Stop reason: HOSPADM

## 2017-05-31 RX ORDER — BUPIVACAINE HYDROCHLORIDE 5 MG/ML
INJECTION, SOLUTION EPIDURAL; INTRACAUDAL
Status: DISCONTINUED | OUTPATIENT
Start: 2017-05-31 | End: 2017-05-31

## 2017-05-31 RX ORDER — SODIUM CHLORIDE 9 MG/ML
INJECTION, SOLUTION INTRAVENOUS
Status: COMPLETED | OUTPATIENT
Start: 2017-05-31 | End: 2017-05-31

## 2017-05-31 RX ORDER — ONDANSETRON 2 MG/ML
4 INJECTION INTRAMUSCULAR; INTRAVENOUS EVERY 12 HOURS PRN
Status: DISCONTINUED | OUTPATIENT
Start: 2017-05-31 | End: 2017-05-31

## 2017-05-31 RX ORDER — DOCUSATE SODIUM 100 MG/1
100 CAPSULE, LIQUID FILLED ORAL 2 TIMES DAILY PRN
Qty: 60 CAPSULE | Refills: 0 | Status: SHIPPED | OUTPATIENT
Start: 2017-05-31 | End: 2017-09-14 | Stop reason: CLARIF

## 2017-05-31 RX ORDER — DEXAMETHASONE SODIUM PHOSPHATE 4 MG/ML
INJECTION, SOLUTION INTRA-ARTICULAR; INTRALESIONAL; INTRAMUSCULAR; INTRAVENOUS; SOFT TISSUE
Status: DISCONTINUED | OUTPATIENT
Start: 2017-05-31 | End: 2017-05-31

## 2017-05-31 RX ORDER — OXYCODONE HYDROCHLORIDE 5 MG/1
15 TABLET ORAL
Status: DISCONTINUED | OUTPATIENT
Start: 2017-05-31 | End: 2017-06-01 | Stop reason: HOSPADM

## 2017-05-31 RX ORDER — RAMELTEON 8 MG/1
8 TABLET ORAL NIGHTLY PRN
Status: DISCONTINUED | OUTPATIENT
Start: 2017-05-31 | End: 2017-06-01 | Stop reason: HOSPADM

## 2017-05-31 RX ORDER — SODIUM CHLORIDE 9 MG/ML
INJECTION, SOLUTION INTRAVENOUS CONTINUOUS
Status: ACTIVE | OUTPATIENT
Start: 2017-05-31 | End: 2017-06-01

## 2017-05-31 RX ORDER — OXYCODONE HYDROCHLORIDE 5 MG/1
15 TABLET ORAL
Status: DISCONTINUED | OUTPATIENT
Start: 2017-05-31 | End: 2017-05-31

## 2017-05-31 RX ORDER — ONDANSETRON 2 MG/ML
INJECTION INTRAMUSCULAR; INTRAVENOUS
Status: DISCONTINUED | OUTPATIENT
Start: 2017-05-31 | End: 2017-05-31

## 2017-05-31 RX ORDER — POLYETHYLENE GLYCOL 3350 17 G/17G
17 POWDER, FOR SOLUTION ORAL DAILY
Status: DISCONTINUED | OUTPATIENT
Start: 2017-05-31 | End: 2017-06-01 | Stop reason: HOSPADM

## 2017-05-31 RX ORDER — NALOXONE HCL 0.4 MG/ML
0.02 VIAL (ML) INJECTION
Status: DISCONTINUED | OUTPATIENT
Start: 2017-05-31 | End: 2017-06-01 | Stop reason: HOSPADM

## 2017-05-31 RX ORDER — VANCOMYCIN HYDROCHLORIDE 500 MG/10ML
INJECTION, POWDER, LYOPHILIZED, FOR SOLUTION INTRAVENOUS
Status: DISCONTINUED | OUTPATIENT
Start: 2017-05-31 | End: 2017-05-31 | Stop reason: HOSPADM

## 2017-05-31 RX ORDER — MIDAZOLAM HYDROCHLORIDE 1 MG/ML
1 INJECTION INTRAMUSCULAR; INTRAVENOUS EVERY 5 MIN PRN
Status: DISCONTINUED | OUTPATIENT
Start: 2017-05-31 | End: 2017-05-31 | Stop reason: HOSPADM

## 2017-05-31 RX ORDER — ROPIVACAINE HYDROCHLORIDE 2 MG/ML
8 INJECTION, SOLUTION EPIDURAL; INFILTRATION; PERINEURAL CONTINUOUS
Status: DISCONTINUED | OUTPATIENT
Start: 2017-05-31 | End: 2017-06-01

## 2017-05-31 RX ORDER — FAMOTIDINE 20 MG/1
20 TABLET, FILM COATED ORAL 2 TIMES DAILY
Status: DISCONTINUED | OUTPATIENT
Start: 2017-05-31 | End: 2017-06-01 | Stop reason: HOSPADM

## 2017-05-31 RX ORDER — OXYCODONE HYDROCHLORIDE 5 MG/1
20 TABLET ORAL
Status: DISCONTINUED | OUTPATIENT
Start: 2017-05-31 | End: 2017-06-01 | Stop reason: HOSPADM

## 2017-05-31 RX ORDER — PROPOFOL 10 MG/ML
VIAL (ML) INTRAVENOUS CONTINUOUS PRN
Status: DISCONTINUED | OUTPATIENT
Start: 2017-05-31 | End: 2017-05-31

## 2017-05-31 RX ORDER — PREGABALIN 150 MG/1
150 CAPSULE ORAL NIGHTLY
Status: DISCONTINUED | OUTPATIENT
Start: 2017-05-31 | End: 2017-06-01 | Stop reason: HOSPADM

## 2017-05-31 RX ORDER — SODIUM CHLORIDE 0.9 % (FLUSH) 0.9 %
3 SYRINGE (ML) INJECTION EVERY 8 HOURS PRN
Status: DISCONTINUED | OUTPATIENT
Start: 2017-05-31 | End: 2017-06-01 | Stop reason: HOSPADM

## 2017-05-31 RX ADMIN — LIDOCAINE HYDROCHLORIDE 0.2 MG: 10 INJECTION, SOLUTION EPIDURAL; INFILTRATION; INTRACAUDAL; PERINEURAL at 09:05

## 2017-05-31 RX ADMIN — LORAZEPAM 0.5 MG: 2 INJECTION INTRAMUSCULAR; INTRAVENOUS at 02:05

## 2017-05-31 RX ADMIN — PHENYLEPHRINE HYDROCHLORIDE 200 MCG: 10 INJECTION INTRAVENOUS at 12:05

## 2017-05-31 RX ADMIN — ACETAMINOPHEN 1000 MG: 500 TABLET ORAL at 11:05

## 2017-05-31 RX ADMIN — HALOPERIDOL 2 MG: 5 INJECTION INTRAMUSCULAR at 02:05

## 2017-05-31 RX ADMIN — SODIUM CHLORIDE, SODIUM GLUCONATE, SODIUM ACETATE, POTASSIUM CHLORIDE, MAGNESIUM CHLORIDE, SODIUM PHOSPHATE, DIBASIC, AND POTASSIUM PHOSPHATE: .53; .5; .37; .037; .03; .012; .00082 INJECTION, SOLUTION INTRAVENOUS at 12:05

## 2017-05-31 RX ADMIN — PROPOFOL 30 MG: 10 INJECTION, EMULSION INTRAVENOUS at 11:05

## 2017-05-31 RX ADMIN — VANCOMYCIN HYDROCHLORIDE 500 MG: 500 INJECTION, POWDER, LYOPHILIZED, FOR SOLUTION INTRAVENOUS at 12:05

## 2017-05-31 RX ADMIN — DEXTROSE 2 G: 50 INJECTION, SOLUTION INTRAVENOUS at 11:05

## 2017-05-31 RX ADMIN — SODIUM CHLORIDE: 0.9 INJECTION, SOLUTION INTRAVENOUS at 09:05

## 2017-05-31 RX ADMIN — ASPIRIN 325 MG: 325 TABLET, DELAYED RELEASE ORAL at 09:05

## 2017-05-31 RX ADMIN — PROPOFOL 100 MCG/KG/MIN: 10 INJECTION, EMULSION INTRAVENOUS at 11:05

## 2017-05-31 RX ADMIN — LIDOCAINE HYDROCHLORIDE 40 MG: 10 INJECTION, SOLUTION INTRAVENOUS at 11:05

## 2017-05-31 RX ADMIN — ACETAMINOPHEN 1000 MG: 500 TABLET ORAL at 05:05

## 2017-05-31 RX ADMIN — OXYCODONE HYDROCHLORIDE 15 MG: 5 TABLET ORAL at 01:05

## 2017-05-31 RX ADMIN — PHENYLEPHRINE HYDROCHLORIDE 200 MCG: 10 INJECTION INTRAVENOUS at 11:05

## 2017-05-31 RX ADMIN — MIDAZOLAM HYDROCHLORIDE 1 MG: 1 INJECTION, SOLUTION INTRAMUSCULAR; INTRAVENOUS at 11:05

## 2017-05-31 RX ADMIN — STANDARDIZED SENNA CONCENTRATE AND DOCUSATE SODIUM 1 TABLET: 8.6; 5 TABLET, FILM COATED ORAL at 09:05

## 2017-05-31 RX ADMIN — OXYCODONE HYDROCHLORIDE 20 MG: 5 TABLET ORAL at 04:05

## 2017-05-31 RX ADMIN — FAMOTIDINE 20 MG: 20 TABLET, FILM COATED ORAL at 09:05

## 2017-05-31 RX ADMIN — DEXAMETHASONE SODIUM PHOSPHATE 8 MG: 4 INJECTION, SOLUTION INTRAMUSCULAR; INTRAVENOUS at 11:05

## 2017-05-31 RX ADMIN — BUPIVACAINE HYDROCHLORIDE 2.3 ML: 5 INJECTION, SOLUTION EPIDURAL; INTRACAUDAL; PERINEURAL at 11:05

## 2017-05-31 RX ADMIN — SODIUM CHLORIDE, SODIUM GLUCONATE, SODIUM ACETATE, POTASSIUM CHLORIDE, MAGNESIUM CHLORIDE, SODIUM PHOSPHATE, DIBASIC, AND POTASSIUM PHOSPHATE: .53; .5; .37; .037; .03; .012; .00082 INJECTION, SOLUTION INTRAVENOUS at 11:05

## 2017-05-31 RX ADMIN — MORPHINE SULFATE 15 MG: 15 TABLET ORAL at 03:05

## 2017-05-31 RX ADMIN — FENTANYL CITRATE 25 MCG: 50 INJECTION INTRAMUSCULAR; INTRAVENOUS at 02:05

## 2017-05-31 RX ADMIN — FENTANYL CITRATE 25 MCG: 50 INJECTION INTRAMUSCULAR; INTRAVENOUS at 01:05

## 2017-05-31 RX ADMIN — VANCOMYCIN HYDROCHLORIDE 1500 MG: 1 INJECTION, POWDER, LYOPHILIZED, FOR SOLUTION INTRAVENOUS at 10:05

## 2017-05-31 RX ADMIN — CARISOPRODOL 350 MG: 350 TABLET ORAL at 09:05

## 2017-05-31 RX ADMIN — PHENYLEPHRINE HYDROCHLORIDE 100 MCG: 10 INJECTION INTRAVENOUS at 11:05

## 2017-05-31 RX ADMIN — POLYETHYLENE GLYCOL 3350 17 G: 17 POWDER, FOR SOLUTION ORAL at 01:05

## 2017-05-31 RX ADMIN — OXYCODONE HYDROCHLORIDE 15 MG: 5 TABLET ORAL at 09:05

## 2017-05-31 RX ADMIN — PREGABALIN 150 MG: 150 CAPSULE ORAL at 09:05

## 2017-05-31 RX ADMIN — TRANEXAMIC ACID 3000 MG: 100 INJECTION, SOLUTION INTRAVENOUS at 12:05

## 2017-05-31 RX ADMIN — KETAMINE HYDROCHLORIDE 25 MG: 100 INJECTION, SOLUTION, CONCENTRATE INTRAMUSCULAR; INTRAVENOUS at 11:05

## 2017-05-31 RX ADMIN — MUPIROCIN 1 G: 20 OINTMENT TOPICAL at 09:05

## 2017-05-31 RX ADMIN — CELECOXIB 400 MG: 200 CAPSULE ORAL at 09:05

## 2017-05-31 RX ADMIN — ACETAMINOPHEN 1000 MG: 10 INJECTION, SOLUTION INTRAVENOUS at 02:05

## 2017-05-31 RX ADMIN — ONDANSETRON 4 MG: 2 INJECTION INTRAMUSCULAR; INTRAVENOUS at 11:05

## 2017-05-31 RX ADMIN — AMITRIPTYLINE HYDROCHLORIDE 50 MG: 50 TABLET, FILM COATED ORAL at 05:05

## 2017-05-31 RX ADMIN — KETAMINE HYDROCHLORIDE 5 MG: 100 INJECTION, SOLUTION, CONCENTRATE INTRAMUSCULAR; INTRAVENOUS at 11:05

## 2017-05-31 RX ADMIN — SODIUM CHLORIDE: 0.9 INJECTION, SOLUTION INTRAVENOUS at 02:05

## 2017-05-31 RX ADMIN — MIDAZOLAM HYDROCHLORIDE 2 MG: 1 INJECTION, SOLUTION INTRAMUSCULAR; INTRAVENOUS at 10:05

## 2017-05-31 RX ADMIN — HALOPERIDOL LACTATE 2 MG: 5 INJECTION, SOLUTION INTRAMUSCULAR at 02:05

## 2017-05-31 RX ADMIN — MORPHINE SULFATE 2 MG: 2 INJECTION, SOLUTION INTRAMUSCULAR; INTRAVENOUS at 02:05

## 2017-05-31 RX ADMIN — GENTAMICIN SULFATE 480 MG: 40 INJECTION, SOLUTION INTRAMUSCULAR; INTRAVENOUS at 12:05

## 2017-05-31 NOTE — ANESTHESIA PROCEDURE NOTES
Spinal    Diagnosis: knee pain  Patient location during procedure: OR  Start time: 5/31/2017 11:15 AM  Timeout: 5/31/2017 11:14 AM  End time: 5/31/2017 11:19 AM  Staffing  Anesthesiologist: KIEL SAMANIEGO  Preanesthetic Checklist  Completed: patient identified, site marked, surgical consent, pre-op evaluation, timeout performed, IV checked, risks and benefits discussed and monitors and equipment checked  Spinal Block  Patient position: sitting  Prep: ChloraPrep  Patient monitoring: heart rate, cardiac monitor and continuous pulse ox  Approach: midline  Location: L3-4  Injection technique: single shot  Needle  Needle type: Neyda   Needle gauge: 25 G  Needle length: 3.5 in  Additional Documentation: no paresthesia on injection  Needle localization: anatomical landmarks  Assessment  Sensory level: T8   Dermatomal levels determined by pinch or prick  Ease of block: easy  Patient's tolerance of the procedure: comfortable throughout block  Medications:  Bolus administered: 2.3 mL of 2.0 mepivacaine  Additional Notes  See intraop for vitals  VSS

## 2017-05-31 NOTE — PROGRESS NOTES
To bedside to assess patient's pain. Patient still reports pain to anterior knee despite bolus via PNC pump. PRN Ativan and Haldol administered for anxiety. Opana restarted at 15mg BID, first dose now. Bolus Lidocaine 1.5% 5cc administered after negative aspiration. Continue PRNs. Can repeat dose if necessary, will continue to monitor closely.     Bandar Mccoy MD  Anesthesiology Resident, PGY-4  Pager [phone]

## 2017-05-31 NOTE — PLAN OF CARE
Problem: Physical Therapy Goal  Goal: Physical Therapy Goal  Goals to be met by: 17     Patient will increase functional independence with mobility by performin. Supine to sit with Set-up [place]  2. Sit to supine with Set-up [place]  3. Sit to stand transfer with Stand-by Assistance  4. Gait  x 150 feet with CGA using Rolling Walker.   5. Ascend/descend 5 stair with bilateral Handrails Minimal Assistance   6. Lower extremity exercise program x 30 reps per handout, with independence    Outcome: Ongoing (interventions implemented as appropriate)  PT eval complete and POC initiated.

## 2017-05-31 NOTE — ANESTHESIA PROCEDURE NOTES
IPACK Single Shot Block    Patient location during procedure: pre-op   Block not for primary anesthetic.  Reason for block: at surgeon's request and post-op pain management   Post-op Pain Location: right knee pain  Start time: 5/31/2017 10:25 AM  Timeout: 5/31/2017 10:21 AM   End time: 5/31/2017 10:35 AM  Staffing  Anesthesiologist: MARYA TONG  Resident/CRNA: WESTON LEDEZMA  Performed: resident/CRNA   Preanesthetic Checklist  Completed: patient identified, site marked, surgical consent, pre-op evaluation, timeout performed, IV checked, risks and benefits discussed and monitors and equipment checked  Peripheral Block  Patient position: supine  Prep: ChloraPrep  Patient monitoring: heart rate, cardiac monitor, continuous pulse ox, continuous capnometry and frequent blood pressure checks  Block type: I PACK  Laterality: right  Injection technique: single shot  Needle  Needle type: Stimuplex   Needle gauge: 21 G  Needle length: 4 in  Needle localization: anatomical landmarks and ultrasound guidance   -ultrasound image captured on disc.  Assessment  Injection assessment: negative aspiration, negative parasthesia and local visualized surrounding nerve  Paresthesia pain: none  Heart rate change: no  Slow fractionated injection: yes  Medications:  Bolus administered: 20 mL of 0.25 ropivacaine  Epinephrine added: 3.75 mcg/mL (1/300,000)  Additional Notes  VSS.  [facility] RN monitoring vitals throughout procedure.  Patient tolerated procedure well.

## 2017-05-31 NOTE — PT/OT/SLP EVAL
Occupational Therapy  Evaluation    Isabel Dennis   MRN: [id number]   Admitting Diagnosis: s/p R TKA (5/31/17)     OT Date of Treatment: 05/31/17   OT Start Time: 1529  OT Stop Time: 1539  OT Total Time (min): 10 min    Billable Minutes:  Evaluation 10    Diagnosis: s/p R TKA on 5/31/17       Past Medical History:   Diagnosis Date    Allergy     Anxiety     Arthritis     DDD (degenerative disc disease), cervical     Depression     Diabetes mellitus     Diabetes mellitus, type 2     Diverticulosis     GERD (gastroesophageal reflux disease)     Hx of colonic polyps     Lung disease     Other and unspecified hyperlipidemia     Post-traumatic osteoarthritis of both knees       Past Surgical History:   Procedure Laterality Date    ADENOIDECTOMY      COLONOSCOPY      epiglottis      4 times    HYSTERECTOMY      Partial, fibroids    KNEE SURGERY      NECK SURGERY      SPINE SURGERY      TONSILLECTOMY         Referring physician: John Ochsner MD  Date referred to OT: 5/31/17    General Precautions: Standard, fall  Orthopedic Precautions: RLE weight bearing as tolerated  Braces: N/A    Do you have any cultural, spiritual, Tenriism conflicts, given your current situation?: none stated      Patient History:  Living Environment  Lives With: spouse, grandchild(gay)  Living Arrangements: house  Home Accessibility: stairs to enter home  Home Layout: Able to live on 1st floor  Number of Stairs to Enter Home: 6  Stair Railings at Home: outside, present at both sides  Transportation Available: family or friend will provide  Living Environment Comment: pt. lives with grandson and spouse who are able to assist if needed. Pt. has a tub and states she sits on the edge of the tub to bathe  Equipment Currently Used at Home: rollator, walker, rolling    Prior level of function:   Bed Mobility/Transfers: independent  Grooming: independent  Bathing: independent  Upper Body Dressing: independent  Lower Body Dressing:  "independent  Toileting: independent  Home Management Skills: independent  Mode of Transportation: Family       Subjective:  Communicated with nursing prior to session. Pt. Stated, "I'm in so much pain, what do you want me to do?"    Chief Complaint: pain  Patient/Family stated goals: return home    Pain/Comfort  Pain Rating 1: 10/10  Location - Side 1: Right  Location - Orientation 1: anterior  Location 1: knee  Pain Addressed 1: Pre-medicate for activity, Reposition, Distraction  Pain Rating Post-Intervention 1: 9/10  Pain Rating 2: 9/10  Location - Orientation 2: anterior  Location 2: knee  Pain Addressed 2: Reposition, Distraction    Objective:  Patient found with: peripheral IV, Polar ice, telemetry, blood pressure cuff, FCD (pure wic catheter )    Cognitive Exam:  Oriented to: Person, Place, Time and Situation  Follows Commands/attention: Follows multistep  commands  Communication: clear/fluent  Memory:  No Deficits noted  Safety awareness/insight to disability: impaired  Coping skills/emotional control: somewhat appropriate to situation, angers easily     Visual/perceptual:  Intact    Physical Exam:  Postural examination/scapula alignment: No postural abnormalities identified  Skin integrity: Visible skin intact  Edema: None noted     Sensation:   Intact    Upper Extremity Range of Motion:  Right Upper Extremity: WNL  Left Upper Extremity: WNL    Upper Extremity Strength:  Right Upper Extremity: WNL  Left Upper Extremity: WNL   Strength: WNL    Fine motor coordination:   Intact    Gross motor coordination: WFL    Functional Mobility:  Bed Mobility:  Rolling/Turning to Left: Modified independent  Rolling/Turning Right: Modified independent  Scooting/Bridging: Modified Independent  Supine to Sit: Stand by Assistance  Sit to Supine: Stand by Assistance    Transfers:  Sit <> Stand Assistance: Contact Guard Assistance (pt. asked to do independently without help and required verbal cues to adhere to safety " "precautions)  Sit <> Stand Assistive Device: Rolling Walker    Functional Ambulation: Pt. Complete functional ambulation with RW (2 steps forward, 2 steps back, steps towards HOB)    Activities of Daily Living:  UE Dressing Level of Assistance: Contact guard (donned gown seated EOB)  LE Dressing Level of Assistance: Total assistance (socks)  Grooming Position: Seated  Grooming Level of Assistance: Stand by assistance      Balance:   Static Sit: GOOD: Takes MODERATE challenges from all directions  Dynamic Sit: GOOD: Maintains balance through MODERATE excursions of active trunk movement  Static Stand: FAIR+: Takes MINIMAL challenges from all directions  Dynamic stand: FAIR+: Needs CLOSE SUPERVISION during gait and is able to right self with minor LOB    Therapeutic Activities and Exercises:  Pt. Participated in OT evaluation, educated on role of OT and POC once admitted to floor    [facility] 6 CLICK ADL  How much help from another person does this patient currently need?  1 = Unable, Total/Dependent Assistance  2 = A lot, Maximum/Moderate Assistance  3 = A little, Minimum/Contact Guard/Supervision  4 = None, Modified [place]/Independent    Putting on and taking off regular lower body clothing? : 2  Bathing (including washing, rinsing, drying)?: 2  Toileting, which includes using toilet, bedpan, or urinal? : 2  Putting on and taking off regular upper body clothing?: 4  Taking care of personal grooming such as brushing teeth?: 3  Eating meals?: 4  Total Score: 17    -PAC Raw Score CMS "G-Code Modifier Level of Impairment Assistance   6 % Total / Unable   7 - 9 CM 80 - 100% Maximal Assist   10-14 CL 60 - 80% Moderate Assist   15 - 19 CK 40 - 60% Moderate Assist   20 - 22 CJ 20 - 40% Minimal Assist   23 CI 1-20% SBA / CGA   24 CH 0% Independent/ Mod I       Patient left supine with all lines intact, call button in reach and nursing present    Assessment:  Isabel Dennis is a 62 y.o. female with a medical " diagnosis of s/p R TKA and presents with decreased endurance, pain, functional mobility and assistance needed for self-care. Pt. Limited by pain during evaluation. Pt. Would benefit from continued OT services to improve overall independence and return to PLOF. Upon d/c pt. Would benefit from further therapy at a skilled nursing facility to further progress towards independence with ADL's and self-care and improve overall independence.     Rehab identified problem list/impairments: Rehab identified problem list/impairments: weakness, pain, impaired endurance, impaired functional mobilty, impaired balance, gait instability    Rehab potential is good.    Activity tolerance: Good    Discharge recommendations: Discharge Facility/Level Of Care Needs: skilled nursing facility (SNF)     Barriers to discharge: Barriers to Discharge: Inaccessible home environment, Decreased caregiver support    Equipment recommendations: bedside commode, bath bench     GOALS:    Occupational Therapy Goals        Problem: Occupational Therapy Goal    Goal Priority Disciplines Outcome Interventions   Occupational Therapy Goal     OT, PT/OT Ongoing (interventions implemented as appropriate)    Description:  Goals to be met by: 6/13/17     Patient will increase functional independence with ADLs by performing:    UE Dressing with Modified [place].  LE Dressing with Modified [place].  Grooming while standing with Modified [place].  Toileting from toilet with Modified [place] for hygiene and clothing management.   Stand pivot transfers with Supervision.  Toilet transfer to toilet with Supervision.                      PLAN:  Patient to be seen 6 x/week to address the above listed problems via self-care/home management, therapeutic activities, therapeutic exercises  Plan of Care expires: 06/30/17  Plan of Care reviewed with: patient         Melodie Abdi, OT  05/31/2017

## 2017-05-31 NOTE — CONSULTS
PM&R consult received.    Reason for consult:  S/p R TKA    Reviewed patient history and current admission.  Full consult to follow.    SEAN Nolen, FNP-C  Physical Medicine & Rehabilitation   05/31/2017  Spectralink: [id number]

## 2017-05-31 NOTE — OPERATIVE NOTE ADDENDUM
Certification of Assistant at Surgery       Surgery Date: 5/31/2017     Participating Surgeons:  Surgeon(s) and Role:     * John L. Ochsner Jr., MD - Primary    Procedures:  Procedure(s) (LRB):  REPLACEMENT-KNEE-TOTAL (Right)    Assistant Surgeon's Certification of Necessity:  I understand that section 1842 (b) (6) (d) of the Social Security Act generally prohibits Medicare Part B reasonable charge payment for the services of assistants at surgery in teaching hospitals when qualified residents are available to furnish such services. I certify that the services for which payment is claimed were medically necessary, and that no qualified resident was available to perform the services. I further understand that these services are subject to post-payment review by the Medicare carrier.      Sharda Sandhu PA-C    05/31/2017  1:37 PM

## 2017-05-31 NOTE — NURSING
Pt admitted to floor, stable, VSS, no complaints at this time noted or stated, I.S at bedside, SCDs intact, will hand over to nurse. BITE pain menu, TV guide, pain control pamphlet, given, explained, and offered to patient. Denice Campos RN

## 2017-05-31 NOTE — NURSING TRANSFER
Nursing Transfer Note      5/31/2017     Transfer To: 556A    Transfer via stretcher    Transfer with cardiac monitoring,IVF, Perineural infusion, Pt belongings bag    Transported by PCT    Medicines sent: None    Chart send with patient: Yes    Notified: daughter    Patient reassessed at: 5/31/17 1700    Upon arrival to floor: cardiac monitor applied, patient oriented to room, call bell in reach and bed in lowest position

## 2017-05-31 NOTE — BRIEF OP NOTE
[facility]  Surgery Department  Operative Note    SUMMARY     Date of Procedure: 5/31/2017     Procedure: Procedure(s) (LRB):  REPLACEMENT-KNEE-TOTAL (Right)     Surgeon(s) and Role:     * John L. Ochsner Jr., MD - Primary    Assisting Surgeon: None    Pre-Operative Diagnosis: Osteoarthritis of knee, unspecified [M17.9]    Post-Operative Diagnosis: Post-Op Diagnosis Codes:     * Osteoarthritis of knee, unspecified [M17.9]    Anesthesia: Femoral Block    Technical Procedures Used:  PS       Description of the Findings of the Procedure: Varus    Significant Surgical Tasks Conducted by the Assistant(s), if Applicable: closure    Complications: No    Estimated Blood Loss (EBL): 100cc             Implants:   Implant Name Type Inv. Item Serial No.  Lot No. LRB No. Used   PLUG BONE #5 - [id number]  PLUG BONE #5  [employer]. [id number] Right 1   CEMENT BONE SIMPLE P INDIVID - [id number]  CEMENT BONE SIMPLE P INDIVID  [employer]. [id number] Right 2   FEMUR PSN PS CMT SZ6 R CCR STD - [id number]  FEMUR PSN PS CMT SZ6 R CCR STD  EMILY,INC [id number] Right 1   TIBIA BASEPLT SZ E 5 DEG R LENNY - [id number]  TIBIA BASEPLT SZ E 5 DEG R LENNY  EMILY,INC [id number] Right 1   PATELLA PSN LENNY POLY 8X29MM - [id number]  PATELLA PSN LENNY POLY 8X29MM  EMILY,INC [id number] Right 1   PERSONA Vivacit-E Articular Surface Fixed BEaring PS Right       EMILY,INC [id number] Right 1       Specimens:   Specimen (12h ago through future)    Start     Ordered    05/31/17 1247  Specimen to Pathology - Surgery  Once     Comments:  1.  Right knee bone and soft tissue, permanent, placed in pathology refrigerator.      05/31/17 1247                  Condition: Good    Disposition: PACU - hemodynamically stable.    Attestation: I was present and scrubbed for the entire procedure.

## 2017-05-31 NOTE — ANESTHESIA PROCEDURE NOTES
Adductor Canal Catheter    Patient location during procedure: pre-op   Block not for primary anesthetic.  Reason for block: at surgeon's request and post-op pain management   Post-op Pain Location: right knee pain  Start time: 5/31/2017 10:25 AM  Timeout: 5/31/2017 10:21 AM   End time: 5/31/2017 10:35 AM  Staffing  Anesthesiologist: MARYA TONG  Resident/CRNA: WESTON LEDEZMA  Performed: resident/CRNA   Preanesthetic Checklist  Completed: patient identified, site marked, surgical consent, pre-op evaluation, timeout performed, IV checked, risks and benefits discussed and monitors and equipment checked  Peripheral Block  Patient position: supine  Prep: ChloraPrep and site prepped and draped  Patient monitoring: heart rate, cardiac monitor, continuous pulse ox, continuous capnometry and frequent blood pressure checks  Block type: adductor canal  Laterality: right  Injection technique: continuous  Needle  Needle type: Tuohy   Needle gauge: 17 G  Needle length: 3.5 in  Needle localization: anatomical landmarks and ultrasound guidance  Catheter type: spring wound  Catheter size: 19 G  Test dose: lidocaine 1.5% with Epi 1-to-200,000 and negative   -ultrasound image captured on disc.  Assessment  Injection assessment: negative aspiration, negative parasthesia and local visualized surrounding nerve  Paresthesia pain: none  Heart rate change: no  Slow fractionated injection: yes  Medications:  Bolus administered: 20 mL of 0.25 ropivacaine  Epinephrine added: 3.75 mcg/mL (1/300,000)  Additional Notes  VSS.  [facility] RN monitoring vitals throughout procedure.  Patient tolerated procedure well.

## 2017-05-31 NOTE — PLAN OF CARE
[facility]    HOME HEALTH ORDERS  FACE TO FACE ENCOUNTER    Patient Name: Isabel Dennis  YOB: 1955    PCP: Sadia De La Rosa MD   PCP Address: [address] / [place] [address]  PCP Phone Number: [phone]  PCP Fax: [phone]    Encounter Date: 05/31/2017    Admit to Home Health    Diagnoses:  Active Hospital Problems    Diagnosis  POA    Primary osteoarthritis of right knee [M17.11]  Yes      Resolved Hospital Problems    Diagnosis Date Resolved POA   No resolved problems to display.       Future Appointments  Date Time Provider Department Center   6/13/2017 10:30 AM Sharda Sandhu PA-C [facility] ORTHO [facility]   6/13/2017 1:30 PM [facility] MRI1 [facility] MRI [facility]   6/13/2017 2:15 PM [facility] MRI1 [facility] MRI [facility]   6/27/2017 9:30 AM Sadia De La Rosa MD [facility] PCW           I have seen and examined this patient face to face today. My clinical findings that support the need for the home health skilled services and home bound status are the following:  Weakness/numbness causing balance and gait disturbance due to Joint Replacement making it taxing to leave home.  Requiring assistive device to leave home due to unsteady gait caused by Joint Replacement.    Allergies:  Review of patient's allergies indicates:   Allergen Reactions    Iodinated contrast media - oral and iv dye Hives and Rash     As per history has problem if has IV extravasation       Diet: regular diet    Activities: activity as tolerated    Home Health Admitting Clinician:   SN/PT to complete comprehensive assessment including routine vital signs. Instruct on disease process and s/s of complications to report to MD. Follow specific home health arthoplasty protocol. Review/verify medication list sent home with the patient at time of discharge  and instruct patient/caregiver as needed.    Notify MD if SBP > 160 or < 90; DBP > 90 or < 50; HR > 120 or < 50; Temp > 101    Home Medical  Equipment:  Walker, 3-1 bedside commode, transfer tub bench    CONSULTS:    Physical Therapy may admit if patient not on coumadin, PT to perform comprehensive assessment if performing admit visit and generate therapy plan of care. Evaluate for home safety and equipment needs; Establish/upgrade home exercise program. Perform/instruct on therapeutic exercises, gait training, transfer training, and Range of Motion.    WOUND CARE ORDERS:  Assess Surgical Incision/DSRG each TX  Aquacel AG drsg applied post-op leave on 14 days post op. Call MD if any drainage reaches border to border of drsg horizontally, s/s of infection, temp >101, induration, swelling or redness.  If dressing is removed per MD order, then apply island dressing, change/teach caregiver to perform daily dressing change if island dressing present.    Medications: Review discharge medications with patient and family and provide education.      Current Discharge Medication List      START taking these medications    Details   aspirin 325 MG tablet Take 1 tablet (325 mg total) by mouth 2 (two) times daily.  Qty: 60 tablet, Refills: 0    Associated Diagnoses: Status post total right knee replacement      docusate sodium (COLACE) 100 MG capsule Take 1 capsule (100 mg total) by mouth 2 (two) times daily as needed for Constipation.  Qty: 60 capsule, Refills: 0    Associated Diagnoses: Status post total right knee replacement      oxycodone-acetaminophen (PERCOCET)  mg per tablet Take 1 tablet by mouth every 4 to 6 hours as needed for Pain.  Qty: 90 tablet, Refills: 0    Associated Diagnoses: Status post total right knee replacement      promethazine (PHENERGAN) 25 MG tablet Take 1 tablet (25 mg total) by mouth every 6 (six) hours as needed for Nausea.  Qty: 30 tablet, Refills: 0    Associated Diagnoses: Status post total right knee replacement         CONTINUE these medications which have NOT CHANGED    Details   AMITIZA 24 mcg Cap Take by mouth as needed.        amitriptyline (ELAVIL) 50 MG tablet Take 50 mg by mouth 2 (two) times daily as needed.       carisoprodol (SOMA) 350 MG tablet Take 350 mg by mouth 3 (three) times daily.        ibuprofen (ADVIL,MOTRIN) 800 MG tablet Take 800 mg by mouth 2 (two) times daily.       naproxen (NAPROSYN) 500 MG tablet       NORCO  mg Tab Take 1 tablet by mouth 4 (four) times daily.       oxyMORphone (OPANA ER) 20 MG 12 hr tablet Take 30 mg by mouth Daily.       b complex vitamins tablet Take 1 tablet by mouth once daily.      CINNAMON BARK (CINNAMON ORAL) Take by mouth Daily.      cod liver oil Oil Take by mouth Daily.       ergocalciferol (VITAMIN D2) 50,000 unit Cap Take 1 capsule (50,000 Units total) by mouth twice a week.  Qty: 24 capsule, Refills: 0    Associated Diagnoses: Vitamin D deficiency      esomeprazole (NEXIUM) 20 MG capsule Take 20 mg by mouth as needed.       fish oil-omega-3 fatty acids 300-1,000 mg capsule Take 2 g by mouth once daily.      FLAXSEED OIL ORAL Take 1,000 mg by mouth once daily. Pt states takes 4 daily, total of 4000 mg daily      fluticasone (FLONASE) 50 mcg/actuation nasal spray 2 sprays by Each Nare route once daily.  Qty: 1 Bottle, Refills: 6      FREESTYLE LITE STRIPS Strp       potassium chloride SA (K-DUR,KLOR-CON) 20 MEQ tablet Take 20 mEq by mouth once daily.       PSYLLIUM HUSK (METAMUCIL ORAL) Take by mouth Daily.             I certify that this patient is confined to her home and needs intermittent skilled nursing care and physical therapy.

## 2017-05-31 NOTE — PROGRESS NOTES
"Pt received in PACU, calm and cooperative. No knee pain only complaining of needing to void, purewick put in place (Carlos STEIN and Gavin STEIN) at bedside. Urine in container observed.  5 min later pt screaming in pain, complaining of 10/10 top and bottom knee pain. Multi model approach used per orders. Pt complaining of "no relief of pain bc of her color and if she was my color then her pain would be gone.  Explained to her that she is being treated like any other pt that comes in PACU.  Carlos STEIN witnessed most of the events. Michelle charge nurse notified of situation and Annika STEIN to take over.  Daughter updated via cell phone.  Dr. Fong and Dr. Mccoy PSH at bedside.  See MAR for meds given.    "

## 2017-05-31 NOTE — PLAN OF CARE
Problem: Occupational Therapy Goal  Goal: Occupational Therapy Goal  Goals to be met by: 6/13/17     Patient will increase functional independence with ADLs by performing:    UE Dressing with Modified [place].  LE Dressing with Modified [place].  Grooming while standing with Modified [place].  Toileting from toilet with Modified [place] for hygiene and clothing management.   Stand pivot transfers with Supervision.  Toilet transfer to toilet with Supervision.    Outcome: Ongoing (interventions implemented as appropriate)  OT eval completed today, pt. Would benefit from skilled OT services to improve overall level of function and independence. Upon d/c pt. Would benefit from skilled nursing facility to improve overall independence and return to prior level.     Melodie Abdi, OTR/L   5/31/2017  Occupational Therapy

## 2017-05-31 NOTE — ANESTHESIA RELEASE NOTE
"Anesthesia Release from PACU Note    Patient: Isabel Dennis    Procedure(s) Performed: Procedure(s) (LRB):  REPLACEMENT-KNEE-TOTAL (Right)    Anesthesia type: regional    Post pain: Adequate analgesia    Post assessment: no apparent anesthetic complications    Last Vitals:   Visit Vitals  /84   Pulse 86   Temp 36.7 °C (98 °F) (Axillary)   Resp 16   Ht 5' 2" (1.575 m)   Wt 117 kg (258 lb)   SpO2 98%   Breastfeeding? No   BMI 47.19 kg/m²       Post vital signs: stable    Level of consciousness: awake    Nausea/Vomiting: no nausea/no vomiting    Complications: none    Airway Patency: patent    Respiratory: unassisted    Cardiovascular: stable and blood pressure at baseline    Hydration: euvolemic  "

## 2017-05-31 NOTE — ANESTHESIA POSTPROCEDURE EVALUATION
"Anesthesia Post Evaluation    Patient: Isabel Dennis    Procedure(s) Performed: Procedure(s) (LRB):  REPLACEMENT-KNEE-TOTAL (Right)    Final Anesthesia Type: regional  Patient location during evaluation: PACU  Patient participation: Yes- Able to Participate  Level of consciousness: awake and alert  Post-procedure vital signs: reviewed and stable  Pain management: adequate  Airway patency: patent  PONV status at discharge: No PONV  Anesthetic complications: no      Cardiovascular status: hemodynamically stable  Respiratory status: unassisted  Hydration status: euvolemic  Follow-up not needed.        Visit Vitals  /84   Pulse 86   Temp 36.7 °C (98 °F) (Axillary)   Resp 16   Ht 5' 2" (1.575 m)   Wt 117 kg (258 lb)   SpO2 98%   Breastfeeding? No   BMI 47.19 kg/m²       Pain/Ivette Score: Pain Assessment Performed: Yes (5/31/2017  3:00 PM)  Presence of Pain: complains of pain/discomfort (5/31/2017  3:00 PM)  Pain Rating Prior to Med Admin: 7 (5/31/2017  3:48 PM)  Ivette Score: 10 (5/31/2017  3:00 PM)      "

## 2017-05-31 NOTE — OP NOTE
DATE OF PROCEDURE:  05/31/2017.    SURGEON:  John Lockwood [facility], Jr., M.D.    ASSISTANT:  ERUM Crawford    OPERATION PERFORMED:  Right total knee arthroplasty.    PREOPERATIVE DIAGNOSIS:  Osteoarthritis, right knee.    POSTOPERATIVE DIAGNOSIS:  Osteoarthritis, right knee.    COMPONENTS USED:  Maeve Persona knee system.  We used a size 6 right femur   standard posterior stabilized; size E right tibia, 5-degree stem; a 16 mm   articular insert, posterior stabilized, vitamin E, highly cross-linked poly, and   a 29 mm patella.    OPERATIVE TECHNIQUE:  No residents were available.  ERUM Crawford,   scrubbed on this case and was necessary.  The estimated blood loss was 100 mL.    Resected bone and tissue sent to Pathology for routine examination.  The patient   was placed supine on the operating table.  Spinal anesthesia had been   introduced.  The right leg was prepped and draped in sterile fashion.  The room   was laminar airflow.  The patient was given preoperative antibiotics and the OR   team wore the sterile exhaust suits in order to minimize risk for infection.  A   foot pump was placed on the left foot to help prevent DVT.  Right leg was   exsanguinated and the tourniquet was inflated to 300 pounds of pressure.  A   straight anterior incision was made.  Sharp dissection was carried down to the   extensor mechanism.  The extensor mechanism was opened in a straight Insall   approach.  Partial release of the medial collateral ligament was performed to   correct the varus deformity.  We did all of the deep fibers and then put the   intramedullary guide in the femur, made the distal cut on the femur with the +4   setting, and then we cut the proximal tibia, removing about 4-5 and a full 7-8   from the lateral side.  We measured the femur, measured it for a 6, cut it   posteriorly for the 6, and it was a little bit tight, so we took another 2, that   helped balance better.  At that point, we did the  notch and chamfer-plasty on   the femur, sized the tibia to an E, and drilled and prepared it with the same   rotation as the femur.  We then measured the patella.  The patella was about 24   mm and we cut it about 16.  I then Pulsavac'd and dried the surfaces, cemented   the tibial baseplate, then the femoral component, removed the excess cement, put   the 16 trial in, put the knee out in extension, and cemented the patella.  Once   the cement was hard and I bathed the knee in the Betadine solution, I put the   actual 16 insert in.  Then, I closed the extensor mechanism with 1 Vicryl over   tranexamic acid and vancomycin powder and closed the subcutaneous tissues with 0   and 3-0 Vicryl and the skin with a 3-0 running Monocryl and a skin adhesive.    Sterile surgical dressing was applied.  There were no complications to this   procedure.      VERNON  dd: 05/31/2017 13:51:48 (CDT)  td: 05/31/2017 17:29:27 (DEWAYNE)  Doc ID   #[id number]  Job ID #[id number]    CC:

## 2017-05-31 NOTE — PT/OT/SLP EVAL
Physical Therapy   Evaluation    Isabel Dennis   MRN: [id number]     PT Received On: 05/31/17  PT Start Time: 1529  PT Stop Time: 1539  PT Total Time (min): 10 min    Billable Minutes:  Evaluation 10 minutes    Diagnosis: S/p R TKA    Past Medical History:   Diagnosis Date    Allergy     Anxiety     Arthritis     DDD (degenerative disc disease), cervical     Depression     Diabetes mellitus     Diabetes mellitus, type 2     Diverticulosis     GERD (gastroesophageal reflux disease)     Hx of colonic polyps     Lung disease     Other and unspecified hyperlipidemia     Post-traumatic osteoarthritis of both knees      Past Surgical History:   Procedure Laterality Date    ADENOIDECTOMY      COLONOSCOPY      epiglottis      4 times    HYSTERECTOMY      Partial, fibroids    KNEE SURGERY      NECK SURGERY      SPINE SURGERY      TONSILLECTOMY         Referring physician: [facility]  Date referred to PT: 5/31/2017     General Precautions: Standard, fall  Orthopedic Precautions: RLE weight bearing as tolerated   Braces: none    Pt found with blood pressure cuff, telemetry, PCEA, pulse ox, peripheral IV, Polar Ice, and FCD.      Do you have any cultural, spiritual, Congregational conflicts, given your current situation?: none stated     Patient History:  Pt lives with her grandson and spouse who can assist.  Pt lives in a [facility] with 5 MELANI with B railing     DME owned: rolling walker and rollator    Previous Level of Function:  Ambulation: (I)  Transfers: (I)  ADLs: (I)    Subjective:  Communicated with RN prior to session.    Pt agreeable to PT eval    Chief Complaint: pain  Patient goals: to return to PLOF    Pain level prior: 11/10 in R knee  Pain level post: 11/10 in R knee    Objective:  Patient found supine in bed     Cognitive Exam:  Oriented to: Person, Place, Time and Situation  Follows Commands/attention: Follows multistep  commands  Communication: clear/fluent  Safety awareness/insight to disability:  intact    Physical Exam:  Postural examination/scapula alignment: Rounded shoulder    Skin integrity: Visible skin intact  Edema: Mild RLE    Sensation:   Intact    Lower Extremity Range of Motion:  Right Lower Extremity: WFL except knee  Left Lower Extremity: WFL     Lower Extremity Strength:  Right Lower Extremity: WFL except knee  Left Lower Extremity: WFL     Functional Mobility:    Bed Mobility:    Supine to sit: CGA  Sit to supine: CGA    Transfers:    Sit<>Stand: Min A with SW.  Cueing for safety.    Ambulation:    Pt ambulated 3 steps forward and backward with SW and Min A.  Pt educated on 3 point gait pattern.  Pt able to verbalize and demonstrate understanding.  Cueing for safety.     Balance:   Static Sit: FAIR+: Able to take MINIMAL challenges from all directions  Dynamic Sit:  FAIR+: Maintains balance through MINIMAL excursions of active trunk motion  Static Stand: FAIR: Maintains without assist but unable to take challenges  Dynamic stand: FAIR: Needs CONTACT GUARD during gait    Therapeutic Activities and Exercises:  PT evaluation performed.  Pt educated on role of PT, safety with functional mobility.      Patient left supine with all lines intact and RN notified.    AM-PAC 6 CLICK MOBILITY  Turning over in bed (including adjusting bedclothes, sheets and blankets)?: 3  Sitting down on and standing up from a chair with arms (e.g., wheelchair, bedside commode, etc.): 3  Moving from lying on back to sitting on the side of the bed?: 3  Moving to and from a bed to a chair (including a wheelchair)?: 3  Need to walk in hospital room?: 3  Climbing 3-5 steps with a railing?: 2  Total Score: 17    AM-PAC Raw Score   CMS G-Code Modifier   Level of Impairment   Assistance     6   CN   100%         Total / Unable   7 - 9   CM   80 - 100%   Maximal Assist     10 - 14   CL   60 - 80%   Moderate Assist     15 - 19   CK   40 - 60%   Moderate Assist     20 - 22   CJ   20 - 40%   Minimal Assist     23   CI   1-20%          SBA / CGA     24 CH   0%   Independent/Modified Independent       Assessment:  Isabel Dennis is a 62 y.o. female with a medical diagnosis of s/p R TKA. She presents with deficits listed below.  Pt tolerated evaluation well today.  Pt is a good candidate for skilled PT services to address the below deficits and to increase functional independence.      Rehab identified problem list/impairments: weakness, impaired endurance, impaired sensation, impaired self care skills, impaired functional mobilty, gait instability, impaired balance, decreased coordination, decreased lower extremity function, decreased safety awareness, pain, decreased ROM, impaired skin, edema, orthopedic precautions    Rehab potential is good.    Activity tolerance: good    Discharge recommendations: Skilled Nursing Facility     Barriers to discharge: Inaccessible home environment     Equipment recommendations: bedside commode and transfer tub bench    GOALS:    Physical Therapy Goals        Problem: Physical Therapy Goal    Goal Priority Disciplines Outcome Goal Variances Interventions   Physical Therapy Goal     PT/OT, PT Ongoing (interventions implemented as appropriate)     Description:  Goals to be met by: 17     Patient will increase functional independence with mobility by performin. Supine to sit with Set-up [place]  2. Sit to supine with Set-up [place]  3. Sit to stand transfer with Stand-by Assistance  4. Gait  x 150 feet with CGA using Rolling Walker.   5. Ascend/descend 5 stair with bilateral Handrails Minimal Assistance   6. Lower extremity exercise program x 30 reps per handout, with independence                      PLAN:    Patient to be seen BID to address the above listed problems via gait training, therapeutic activity, therapeutic exercise, and neuromuscular re-education   Plan of Care reviewed with: patient    Javier Tabor, PT, DPT  2017   [phone]

## 2017-05-31 NOTE — TRANSFER OF CARE
"Anesthesia Transfer of Care Note    Patient: Isabel Dennis    Procedure(s) Performed: Procedure(s) (LRB):  REPLACEMENT-KNEE-TOTAL (Right)    Patient location: PACU    Anesthesia Type: general and regional    Transport from OR: Transported from OR on 6-10 L/min O2 by face mask with adequate spontaneous ventilation    Post pain: adequate analgesia    Post assessment: no apparent anesthetic complications    Post vital signs: stable    Level of consciousness: awake, alert and oriented    Nausea/Vomiting: no nausea/vomiting    Complications: none    Transfer of care protocol was followed      Last vitals:   Visit Vitals  /87 (BP Location: Left arm, Patient Position: Lying, BP Method: Automatic)   Pulse 94   Temp 36.7 °C (98 °F) (Axillary)   Resp 18   Ht 5' 2" (1.575 m)   Wt 117 kg (258 lb)   SpO2 100%   Breastfeeding? No   BMI 47.19 kg/m²     "

## 2017-06-01 ENCOUNTER — HOSPITAL ENCOUNTER (INPATIENT)
Facility: HOSPITAL | Age: 62
LOS: 6 days | Discharge: HOME HEALTH | DRG: 560 | End: 2017-06-07
Attending: HOSPITALIST | Admitting: HOSPITALIST
Payer: MEDICARE

## 2017-06-01 ENCOUNTER — RESEARCH ENCOUNTER (OUTPATIENT)
Dept: RESEARCH | Facility: HOSPITAL | Age: 62
End: 2017-06-01

## 2017-06-01 VITALS
OXYGEN SATURATION: 99 % | SYSTOLIC BLOOD PRESSURE: 158 MMHG | TEMPERATURE: 99 F | WEIGHT: 264.31 LBS | BODY MASS INDEX: 48.64 KG/M2 | RESPIRATION RATE: 16 BRPM | HEIGHT: 62 IN | DIASTOLIC BLOOD PRESSURE: 74 MMHG | HEART RATE: 101 BPM

## 2017-06-01 DIAGNOSIS — Z96.651 STATUS POST TOTAL RIGHT KNEE REPLACEMENT: ICD-10-CM

## 2017-06-01 DIAGNOSIS — E11.9 TYPE 2 DIABETES MELLITUS WITHOUT COMPLICATION, WITHOUT LONG-TERM CURRENT USE OF INSULIN: Primary | ICD-10-CM

## 2017-06-01 DIAGNOSIS — K21.9 GASTROESOPHAGEAL REFLUX DISEASE, ESOPHAGITIS PRESENCE NOT SPECIFIED: ICD-10-CM

## 2017-06-01 DIAGNOSIS — R60.9 EDEMA, UNSPECIFIED TYPE: ICD-10-CM

## 2017-06-01 DIAGNOSIS — E66.01 MORBID OBESITY, UNSPECIFIED OBESITY TYPE: ICD-10-CM

## 2017-06-01 DIAGNOSIS — M17.11 PRIMARY OSTEOARTHRITIS OF RIGHT KNEE: ICD-10-CM

## 2017-06-01 DIAGNOSIS — J30.2 SEASONAL ALLERGIC RHINITIS, UNSPECIFIED ALLERGIC RHINITIS TRIGGER: ICD-10-CM

## 2017-06-01 LAB — HGB BLD-MCNC: 9.7 G/DL

## 2017-06-01 PROCEDURE — 12000000 HC SNF SEMI-PRIVATE ROOM

## 2017-06-01 PROCEDURE — 97116 GAIT TRAINING THERAPY: CPT

## 2017-06-01 PROCEDURE — 97110 THERAPEUTIC EXERCISES: CPT

## 2017-06-01 PROCEDURE — 99222 1ST HOSP IP/OBS MODERATE 55: CPT | Mod: ,,, | Performed by: NURSE PRACTITIONER

## 2017-06-01 PROCEDURE — 25000003 PHARM REV CODE 250: Performed by: PHYSICIAN ASSISTANT

## 2017-06-01 PROCEDURE — 25000003 PHARM REV CODE 250: Performed by: STUDENT IN AN ORGANIZED HEALTH CARE EDUCATION/TRAINING PROGRAM

## 2017-06-01 PROCEDURE — 25000003 PHARM REV CODE 250: Performed by: ANESTHESIOLOGY

## 2017-06-01 PROCEDURE — 85018 HEMOGLOBIN: CPT

## 2017-06-01 PROCEDURE — 63600175 PHARM REV CODE 636 W HCPCS: Performed by: PHYSICIAN ASSISTANT

## 2017-06-01 PROCEDURE — 63600175 PHARM REV CODE 636 W HCPCS: Performed by: STUDENT IN AN ORGANIZED HEALTH CARE EDUCATION/TRAINING PROGRAM

## 2017-06-01 PROCEDURE — 99231 SBSQ HOSP IP/OBS SF/LOW 25: CPT | Mod: ,,, | Performed by: ANESTHESIOLOGY

## 2017-06-01 PROCEDURE — 25000003 PHARM REV CODE 250: Performed by: ORTHOPAEDIC SURGERY

## 2017-06-01 PROCEDURE — 63600175 PHARM REV CODE 636 W HCPCS: Performed by: ORTHOPAEDIC SURGERY

## 2017-06-01 PROCEDURE — 97530 THERAPEUTIC ACTIVITIES: CPT

## 2017-06-01 PROCEDURE — 36415 COLL VENOUS BLD VENIPUNCTURE: CPT

## 2017-06-01 RX ORDER — PANTOPRAZOLE SODIUM 40 MG/1
40 TABLET, DELAYED RELEASE ORAL DAILY
Status: DISCONTINUED | OUTPATIENT
Start: 2017-06-02 | End: 2017-06-07 | Stop reason: HOSPADM

## 2017-06-01 RX ORDER — OXYCODONE HYDROCHLORIDE 5 MG/1
5 TABLET ORAL EVERY 4 HOURS PRN
Status: CANCELLED | OUTPATIENT
Start: 2017-06-01

## 2017-06-01 RX ORDER — PANTOPRAZOLE SODIUM 40 MG/1
40 TABLET, DELAYED RELEASE ORAL DAILY
Status: CANCELLED | OUTPATIENT
Start: 2017-06-02

## 2017-06-01 RX ORDER — AMITRIPTYLINE HYDROCHLORIDE 50 MG/1
50 TABLET, FILM COATED ORAL 2 TIMES DAILY PRN
Status: DISCONTINUED | OUTPATIENT
Start: 2017-06-01 | End: 2017-06-02

## 2017-06-01 RX ORDER — CARISOPRODOL 350 MG/1
350 TABLET ORAL 3 TIMES DAILY
Status: CANCELLED | OUTPATIENT
Start: 2017-06-01

## 2017-06-01 RX ORDER — ACETAMINOPHEN 325 MG/1
650 TABLET ORAL EVERY 6 HOURS PRN
Status: DISCONTINUED | OUTPATIENT
Start: 2017-06-01 | End: 2017-06-02

## 2017-06-01 RX ORDER — METOCLOPRAMIDE HYDROCHLORIDE 5 MG/5ML
5 SOLUTION ORAL EVERY 6 HOURS PRN
Status: DISCONTINUED | OUTPATIENT
Start: 2017-06-01 | End: 2017-06-07 | Stop reason: HOSPADM

## 2017-06-01 RX ORDER — METOCLOPRAMIDE HYDROCHLORIDE 5 MG/5ML
5 SOLUTION ORAL EVERY 6 HOURS PRN
Status: CANCELLED | OUTPATIENT
Start: 2017-06-01

## 2017-06-01 RX ORDER — OXYCODONE HYDROCHLORIDE 5 MG/1
10 TABLET ORAL EVERY 4 HOURS PRN
Status: DISCONTINUED | OUTPATIENT
Start: 2017-06-01 | End: 2017-06-02

## 2017-06-01 RX ORDER — MORPHINE SULFATE 15 MG/1
15 TABLET ORAL 3 TIMES DAILY
Status: DISCONTINUED | OUTPATIENT
Start: 2017-06-01 | End: 2017-06-01 | Stop reason: HOSPADM

## 2017-06-01 RX ORDER — DOCUSATE SODIUM 100 MG/1
100 CAPSULE, LIQUID FILLED ORAL 2 TIMES DAILY PRN
Status: DISCONTINUED | OUTPATIENT
Start: 2017-06-01 | End: 2017-06-07 | Stop reason: HOSPADM

## 2017-06-01 RX ORDER — ASPIRIN 325 MG
325 TABLET ORAL 2 TIMES DAILY
Status: CANCELLED | OUTPATIENT
Start: 2017-06-01

## 2017-06-01 RX ORDER — AMOXICILLIN 250 MG
1 CAPSULE ORAL 2 TIMES DAILY
Status: CANCELLED | OUTPATIENT
Start: 2017-06-01

## 2017-06-01 RX ORDER — RAMELTEON 8 MG/1
8 TABLET ORAL NIGHTLY PRN
Status: CANCELLED | OUTPATIENT
Start: 2017-06-01

## 2017-06-01 RX ORDER — RAMELTEON 8 MG/1
8 TABLET ORAL NIGHTLY PRN
Status: DISCONTINUED | OUTPATIENT
Start: 2017-06-01 | End: 2017-06-07 | Stop reason: HOSPADM

## 2017-06-01 RX ORDER — POTASSIUM CHLORIDE 20 MEQ/1
20 TABLET, EXTENDED RELEASE ORAL DAILY
Status: CANCELLED | OUTPATIENT
Start: 2017-06-02

## 2017-06-01 RX ORDER — AMITRIPTYLINE HYDROCHLORIDE 50 MG/1
50 TABLET, FILM COATED ORAL 2 TIMES DAILY PRN
Status: CANCELLED | OUTPATIENT
Start: 2017-06-01

## 2017-06-01 RX ORDER — OXYCODONE HYDROCHLORIDE 5 MG/1
5 TABLET ORAL EVERY 4 HOURS PRN
Status: DISCONTINUED | OUTPATIENT
Start: 2017-06-01 | End: 2017-06-02

## 2017-06-01 RX ORDER — CARISOPRODOL 350 MG/1
350 TABLET ORAL 3 TIMES DAILY
Status: DISCONTINUED | OUTPATIENT
Start: 2017-06-01 | End: 2017-06-07 | Stop reason: HOSPADM

## 2017-06-01 RX ORDER — ACETAMINOPHEN 325 MG/1
650 TABLET ORAL EVERY 6 HOURS PRN
Status: CANCELLED | OUTPATIENT
Start: 2017-06-01

## 2017-06-01 RX ORDER — MAG HYDROX/ALUMINUM HYD/SIMETH 200-200-20
15 SUSPENSION, ORAL (FINAL DOSE FORM) ORAL EVERY 6 HOURS PRN
Status: CANCELLED | OUTPATIENT
Start: 2017-06-01

## 2017-06-01 RX ORDER — DOCUSATE SODIUM 100 MG/1
100 CAPSULE, LIQUID FILLED ORAL 2 TIMES DAILY PRN
Status: CANCELLED | OUTPATIENT
Start: 2017-06-01

## 2017-06-01 RX ORDER — MAG HYDROX/ALUMINUM HYD/SIMETH 200-200-20
15 SUSPENSION, ORAL (FINAL DOSE FORM) ORAL EVERY 6 HOURS PRN
Status: DISCONTINUED | OUTPATIENT
Start: 2017-06-01 | End: 2017-06-07 | Stop reason: HOSPADM

## 2017-06-01 RX ORDER — AMOXICILLIN 250 MG
1 CAPSULE ORAL 2 TIMES DAILY
Status: DISCONTINUED | OUTPATIENT
Start: 2017-06-01 | End: 2017-06-07 | Stop reason: HOSPADM

## 2017-06-01 RX ORDER — ASPIRIN 325 MG
325 TABLET ORAL 2 TIMES DAILY
Status: DISCONTINUED | OUTPATIENT
Start: 2017-06-01 | End: 2017-06-07 | Stop reason: HOSPADM

## 2017-06-01 RX ORDER — FLUTICASONE PROPIONATE 50 MCG
2 SPRAY, SUSPENSION (ML) NASAL DAILY
Status: DISCONTINUED | OUTPATIENT
Start: 2017-06-02 | End: 2017-06-07 | Stop reason: HOSPADM

## 2017-06-01 RX ORDER — FLUTICASONE PROPIONATE 50 MCG
2 SPRAY, SUSPENSION (ML) NASAL DAILY
Status: CANCELLED | OUTPATIENT
Start: 2017-06-02

## 2017-06-01 RX ORDER — OXYCODONE HYDROCHLORIDE 5 MG/1
10 TABLET ORAL EVERY 4 HOURS PRN
Status: CANCELLED | OUTPATIENT
Start: 2017-06-01

## 2017-06-01 RX ORDER — POTASSIUM CHLORIDE 20 MEQ/1
20 TABLET, EXTENDED RELEASE ORAL DAILY
Status: DISCONTINUED | OUTPATIENT
Start: 2017-06-02 | End: 2017-06-02

## 2017-06-01 RX ADMIN — OXYCODONE HYDROCHLORIDE 20 MG: 5 TABLET ORAL at 11:06

## 2017-06-01 RX ADMIN — CARISOPRODOL 350 MG: 350 TABLET ORAL at 09:06

## 2017-06-01 RX ADMIN — LORAZEPAM 0.5 MG: 2 INJECTION INTRAMUSCULAR; INTRAVENOUS at 05:06

## 2017-06-01 RX ADMIN — OXYCODONE HYDROCHLORIDE 20 MG: 5 TABLET ORAL at 02:06

## 2017-06-01 RX ADMIN — ROPIVACAINE HYDROCHLORIDE 8 ML/HR: 2 INJECTION, SOLUTION EPIDURAL; INFILTRATION at 10:06

## 2017-06-01 RX ADMIN — MORPHINE SULFATE 2 MG: 2 INJECTION, SOLUTION INTRAMUSCULAR; INTRAVENOUS at 07:06

## 2017-06-01 RX ADMIN — RAMELTEON 8 MG: 8 TABLET, FILM COATED ORAL at 08:06

## 2017-06-01 RX ADMIN — CARISOPRODOL 350 MG: 350 TABLET ORAL at 05:06

## 2017-06-01 RX ADMIN — AMITRIPTYLINE HYDROCHLORIDE 50 MG: 50 TABLET, FILM COATED ORAL at 02:06

## 2017-06-01 RX ADMIN — DEXTROSE 2 G: 50 INJECTION, SOLUTION INTRAVENOUS at 08:06

## 2017-06-01 RX ADMIN — STANDARDIZED SENNA CONCENTRATE AND DOCUSATE SODIUM 1 TABLET: 8.6; 5 TABLET, FILM COATED ORAL at 08:06

## 2017-06-01 RX ADMIN — POLYETHYLENE GLYCOL 3350 17 G: 17 POWDER, FOR SOLUTION ORAL at 08:06

## 2017-06-01 RX ADMIN — VANCOMYCIN HYDROCHLORIDE 1500 MG: 100 INJECTION, POWDER, LYOPHILIZED, FOR SOLUTION INTRAVENOUS at 12:06

## 2017-06-01 RX ADMIN — ACETAMINOPHEN 1000 MG: 500 TABLET ORAL at 05:06

## 2017-06-01 RX ADMIN — MORPHINE SULFATE 2 MG: 2 INJECTION, SOLUTION INTRAMUSCULAR; INTRAVENOUS at 02:06

## 2017-06-01 RX ADMIN — ASPIRIN 325 MG: 325 TABLET, DELAYED RELEASE ORAL at 08:06

## 2017-06-01 RX ADMIN — OXYCODONE HYDROCHLORIDE 10 MG: 5 TABLET ORAL at 09:06

## 2017-06-01 RX ADMIN — AMITRIPTYLINE HYDROCHLORIDE 50 MG: 50 TABLET, FILM COATED ORAL at 08:06

## 2017-06-01 RX ADMIN — FAMOTIDINE 20 MG: 20 TABLET, FILM COATED ORAL at 08:06

## 2017-06-01 RX ADMIN — MORPHINE SULFATE 15 MG: 15 TABLET ORAL at 01:06

## 2017-06-01 RX ADMIN — OXYCODONE HYDROCHLORIDE 20 MG: 5 TABLET ORAL at 05:06

## 2017-06-01 RX ADMIN — CARISOPRODOL 350 MG: 350 TABLET ORAL at 01:06

## 2017-06-01 RX ADMIN — OXYCODONE HYDROCHLORIDE 20 MG: 5 TABLET ORAL at 08:06

## 2017-06-01 RX ADMIN — MORPHINE SULFATE 15 MG: 15 TABLET ORAL at 08:06

## 2017-06-01 RX ADMIN — ASPIRIN 325 MG ORAL TABLET 325 MG: 325 PILL ORAL at 08:06

## 2017-06-01 RX ADMIN — OXYCODONE HYDROCHLORIDE 15 MG: 5 TABLET ORAL at 12:06

## 2017-06-01 NOTE — NURSING
Pt in bed resting. Pt denies c/o pain or n/v. D/c orders and prescriptions given to pt. Pt states understanding of orders. D/c PIV and tolerated well by pt. Pt transported to [facility] vehicle via w/c.

## 2017-06-01 NOTE — NURSING
Called report to Sharda at [facility]. Nurse understood report and pts orders. And estimated  time at 1830 - 1900. Will continue to monitor.

## 2017-06-01 NOTE — ASSESSMENT & PLAN NOTE
-  S/p R TKA on 5/31/17.   -  WBAT per ortho  -  Encourage mobility, OOB in chair at least 3 hours per day, and early ambulation as appropriate   -  Continue PT/OT   -  Pain management.  PNC per anesthesia.    -  DVT prophylaxis:  ANTOHNY, SCD.  -  Reviewed discharge options with patient (inpatient rehab, SNF, home health therapy, and outpatient therapy).  Encourage participation with therapy.

## 2017-06-01 NOTE — HOSPITAL COURSE
5/31/17:  Evaluated by therapy.  Bed mobility CGA.  Sit to stand Humphrey and SW.  Ambulated 3 steps forward and backwards Humphrey and SW.  UBD CGA and LBD totalA.  6/1/17:  Bed mobility SBA-CGA.  Sit to stand CGA and RW and transfers CGA and RW.  Ambulated 50 ft CGA and RW.

## 2017-06-01 NOTE — PT/OT/SLP PROGRESS
Physical Therapy  Treatment    Isabel Dennis   MRN: [id number]   Admitting Diagnosis: primary OA of (R) knee    PT Received On: 06/01/17  PT Start Time: 1343     PT Stop Time: 1409    PT Total Time (min): 26 min       Billable Minutes:  Gait [id number] and Therapeutic Activity 13    Treatment Type: Treatment  PT/PTA: PT             General Precautions: Standard, fall  Orthopedic Precautions: RLE weight bearing as tolerated   Braces: N/A         Subjective:  Communicated with nsg prior to session.  -Pt agreeable to PT session    Pain/Comfort  Pain Rating 1: 9/10  Location - Side 1: Right  Location - Orientation 1: anterior  Location 1: knee  Pain Addressed 1: Pre-medicate for activity, Reposition, Distraction  Pain Rating Post-Intervention 1: 10/10    Objective:   Patient found with: perineural catheter, peripheral IV, PCEA, FCD    Functional Mobility:  Bed Mobility:   Scooting/Bridging: Contact Guard Assistance  Supine to Sit: Contact Guard Assistance  Sit to Supine: Contact Guard Assistance    Transfers:  Sit <> Stand Assistance: Contact Guard Assistance  Sit <> Stand Assistive Device: Rolling Walker  Bed <> Chair Technique: Stand Pivot  Bed <> Chair Assistance: Contact Guard Assistance  Bed <> Chair Assistive Device: Rolling Walker  Toilet Transfer Technique: Stand Pivot  Toilet Transfer Assistance: Contact Guard Assistance  Toilet Transfer Assistive Device: Rolling Walker    Gait:   Gait Distance: 55' x1 trial  Assistance 1: Contact Guard Assistance  Gait Assistive Device: Rolling walker  Gait Pattern: 3-point gait  Gait Deviation(s): decreased beatrice, increased time in double stance, decreased velocity of limb motion, decreased step length, decreased stride length, decreased toe-to-floor clearance, decreased weight-shifting ability, foot flat    Stairs: Unable to perform 2/2 pain      Balance:   Static Sit: FAIR+: Able to take MINIMAL challenges from all directions  Dynamic Sit: FAIR+: Maintains balance through  "MINIMAL excursions of active trunk motion  Static Stand: FAIR+: Takes MINIMAL challenges from all directions  Dynamic stand: FAIR: Needs CONTACT GUARD during gait     Therapeutic Activities and Exercises:  -Pt educated on:  A. PT POC and role of PT  B. Importance of OOB activity to improve functional outcomes after surgery  C. S/s associated with TKR procedure  D. Importance of re-gaining ROM early in acute phase  E. DME mgmt and gait/transfer sequencing  F. Performing HEP to reduce risk of developing blood clots  G. Safety awareness during functional tasks    -Pt performed exercises x30 reps of:  A. AP  B. GS  C. QS    -During toileting, pt was verbally aggressive and was shouting at PT/therapy tech saying, "let me do it myself, I don't need any help". PT told pt multiple times that she could not be left un-attended and could not stand without using walker for support. Pt stood from [facility] without alerting therapy tech/PT and perseverated on the fact that she did not need assistance. PT further explained the safety procedures and precautions to the patient to prevent falls.    AM-PAC 6 CLICK MOBILITY  How much help from another person does this patient currently need?   1 = Unable, Total/Dependent Assistance  2 = A lot, Maximum/Moderate Assistance  3 = A little, Minimum/Contact Guard/Supervision  4 = None, Modified Craig/Independent    Turning over in bed (including adjusting bedclothes, sheets and blankets)?: 3  Sitting down on and standing up from a chair with arms (e.g., wheelchair, bedside commode, etc.): 3  Moving from lying on back to sitting on the side of the bed?: 3  Moving to and from a bed to a chair (including a wheelchair)?: 3  Need to walk in hospital room?: 3  Climbing 3-5 steps with a railing?: 2  Total Score: 17    AM-PAC Raw Score CMS G-Code Modifier Level of Impairment Assistance   6 % Total / Unable   7 - 9 CM 80 - 100% Maximal Assist   10 - 14 CL 60 - 80% Moderate Assist   15 - 19 " CK 40 - 60% Moderate Assist   20 - 22 CJ 20 - 40% Minimal Assist   23 CI 1-20% SBA / CGA   24 CH 0% Independent/ Mod I     Patient left supine with all lines intact, call button in reach and nsg notified.    Assessment:  Isabel Dennis is a 62 y.o. female with a medical diagnosis of primary OA of (R) knee and presents with impaired functional mobility. Pt tolerated afternoon session fairly today. Pt with no LOB during ambulation and t/f's today but demonstrated decreased safety awareness during toileting and functional tasks. Pt progressing well with therapy and remains appropriate for IP Rehab placement at this time 2/2 decreased caregiver support and inaccessible home environment (5 MELANI home).    Rehab identified problem list/impairments: Rehab identified problem list/impairments: weakness, impaired endurance, impaired self care skills, gait instability, impaired balance, decreased lower extremity function, decreased ROM, orthopedic precautions, pain, decreased safety awareness, impaired joint extensibility, impaired functional mobilty    Rehab potential is good.    Activity tolerance: Good    Discharge recommendations: Discharge Facility/Level Of Care Needs: rehabilitation facility     Barriers to discharge: Barriers to Discharge: Inaccessible home environment, Decreased caregiver support    Equipment recommendations: Equipment Needed After Discharge: bedside commode, bath bench     GOALS:    Physical Therapy Goals        Problem: Physical Therapy Goal    Goal Priority Disciplines Outcome Goal Variances Interventions   Physical Therapy Goal     PT/OT, PT Ongoing (interventions implemented as appropriate)     Description:  Goals to be met by: 17     Patient will increase functional independence with mobility by performin. Supine to sit with Set-up [place]  2. Sit to supine with Set-up [place]  3. Sit to stand transfer with Stand-by Assistance  4. Gait  x 100 feet with CGA using Rolling  Walker.   5. Ascend/descend 5 stair with bilateral Handrails Minimal Assistance   6. Lower extremity exercise program x 30 reps per handout, with independence                       PLAN:    Patient to be seen daily  to address the above listed problems via gait training, therapeutic activities, neuromuscular re-education, therapeutic exercises  Plan of Care expires: 07/01/17  Plan of Care reviewed with: patient         Todd Reyes, PT  06/01/2017

## 2017-06-01 NOTE — PLAN OF CARE
Medicare discharge appeals right discussed with patient. IMM signed and placed in patient's chart. Patient verbalized understanding of IMM rights.     06/01/17 1200   Medicare Message   Important Message from Medicare regarding Discharge Appeal Rights Given to patient/caregiver;Explained to patient/caregiver;Signed/date by patient/caregiver   Date IMM was signed 06/01/17   Time IMM was signed 1200

## 2017-06-01 NOTE — PLAN OF CARE
Problem: Patient Care Overview  Goal: Plan of Care Review  Outcome: Ongoing (interventions implemented as appropriate)  Patient progressing with POC. Pain controlled with PRN medication and PNC infusion. Vital signs stable. Afebrile. Safety and fall precautions active. Polar ice and foot pumps in place.Call light in reach. Will continue to monitor per frequent rounding.

## 2017-06-01 NOTE — ADDENDUM NOTE
Addendum  created 06/01/17 1210 by Mikel Merida MD    Charge Capture section accepted, Sign clinical note

## 2017-06-01 NOTE — HPI
Isabel Dennis is a 62-year-old female with PMHx of GERD, arthritis, obesity, DMII, HTN, and HLD.  Patient followed by ortho for worsening right knee pain failed by non-operative treatment.  She was admitted to [facility] on 5/31/17 for surgical intervention.  Now, s/p R TKA on 5/31/17.     Functional History: [place] with her  and 13-year-old grandson in a single story home with 6 steps to enter.  Prior to admission, was independent/mod I with ADLs and mobility.  Ambulated PRN with RW.  DME: CM.

## 2017-06-01 NOTE — PLAN OF CARE
Problem: Physical Therapy Goal  Goal: Physical Therapy Goal  Goals to be met by: 17     Patient will increase functional independence with mobility by performin. Supine to sit with Set-up [place]  2. Sit to supine with Set-up [place]  3. Sit to stand transfer with Stand-by Assistance  4. Gait  x 100 feet with CGA using Rolling Walker.   5. Ascend/descend 5 stair with bilateral Handrails Minimal Assistance   6. Lower extremity exercise program x 30 reps per handout, with independence      Outcome: Ongoing (interventions implemented as appropriate)  Pt tolerated afternoon session fairly today. Pt with no LOB during ambulation and t/f's today but demonstrated decreased safety awareness during toileting and functional tasks. Pt progressing well with therapy and remains appropriate for IP Rehab placement at this time 2/2 decreased caregiver support and inaccessible home environment (5 MELANI home).

## 2017-06-01 NOTE — PROGRESS NOTES
"Left femoral PNC has been paused. Dressing CDI.  Catheter discontinued, tip intact. Pt crying inconsolably.. Asking for multiple meds, including Elavil, Soma and Opana.  Camilo RN at bedside educated patient regarding times these medications are available.  Pt states that I "don't care about black people" and her "white friend didn't suffer like this".  Attempts to redirect patient were unsuccessful.    "

## 2017-06-01 NOTE — PROGRESS NOTES
Ortho/Spine Postop Progress Note    Postop day: 1    ID: The patient is a 62 y.o. female status post: TKA doing well    Overnight Events: no acute events, pain controlled, - BM + flatus    Vitals:    06/01/17 0447   BP: 132/74   Pulse: 83   Resp: 16   Temp: 97.8 °F (36.6 °C)       Drain Output  05/30 1901 - 05/31 1900  In: 2740   Out: 100     Physical Exam:  NAD, A/O x 3.  Wound c/d/i with clean dressing.  No focal motor or sensory deficits noted.    Assessment: The patient is a 62 y.o. female status post: TKA doing well    Plan:  1) Antibiotics: post op abx x 24 hours  2) Weight bearing status: WBAT  3) Labs: post op labs reviewed  4) DVT Prophylaxis: ASA  5) Lines/Drains: PIV  6) Dispo: home tomorrow

## 2017-06-01 NOTE — SUBJECTIVE & OBJECTIVE
"Past Medical History:   Diagnosis Date    Allergy     Anxiety     Arthritis     DDD (degenerative disc disease), cervical     Depression     Diabetes mellitus     Diabetes mellitus, type 2     Diverticulosis     GERD (gastroesophageal reflux disease)     Hx of colonic polyps     Lung disease     Other and unspecified hyperlipidemia     Post-traumatic osteoarthritis of both knees      Past Surgical History:   Procedure Laterality Date    ADENOIDECTOMY      COLONOSCOPY      epiglottis      4 times    HYSTERECTOMY      Partial, fibroids    KNEE SURGERY      NECK SURGERY      SPINE SURGERY      TONSILLECTOMY       Review of patient's allergies indicates:   Allergen Reactions    Iodinated contrast media - oral and iv dye Hives and Rash     As per history has problem if has IV extravasation       Scheduled Medications:    acetaminophen  1,000 mg Oral Q6H    aspirin  325 mg Oral BID    carisoprodol  350 mg Oral TID    famotidine  20 mg Oral BID    morphine  15 mg Oral TID    polyethylene glycol  17 g Oral Daily    pregabalin  150 mg Oral QHS    senna-docusate 8.6-50 mg  1 tablet Oral BID       PRN Medications: amitriptyline, bisacodyl, haloperidol lactate, lorazepam, morphine, morphine, morphine, naloxone, ondansetron, oxycodone, oxycodone, oxycodone, promethazine (PHENERGAN) IVPB, ramelteon, sodium chloride 0.9%    Family History     Problem Relation (Age of Onset)    Arthritis Mother    Cancer Father, Brother    Diabetes Paternal Aunt    Heart disease Father    Heart failure Brother (62)    Hypertension Father, Mother    Stroke Paternal Aunt        Social History Main Topics    Smoking status: Former Smoker     Packs/day: 1.00     Years: 20.00     Types: Cigarettes     Quit date: 1/26/2007    Smokeless tobacco: Never Used    Alcohol use Yes      Comment: "not enough to talk about "    Drug use: No    Sexual activity: No     Review of Systems   Constitutional: Negative for chills, " fatigue and fever.   HENT: Negative for drooling, hearing loss, trouble swallowing and voice change.    Eyes: Negative for pain and visual disturbance.   Respiratory: Negative for cough, shortness of breath and wheezing.    Cardiovascular: Negative for chest pain and palpitations.   Gastrointestinal: Negative for abdominal distention, nausea and vomiting.   Genitourinary: Negative for difficulty urinating and flank pain.   Musculoskeletal: Positive for arthralgias, joint swelling and myalgias. Negative for back pain and neck pain.   Skin: Negative for rash and wound.   Neurological: Negative for dizziness, weakness, numbness and headaches.   Psychiatric/Behavioral: Negative for agitation and hallucinations. The patient is not nervous/anxious.      Objective:     Vital Signs (Most Recent):  Temp: 97.8 °F (36.6 °C) (06/01/17 0447)  Pulse: 99 (06/01/17 1040)  Resp: 16 (06/01/17 0447)  BP: 132/74 (06/01/17 0447)  SpO2: 95 % (06/01/17 0447)    Vital Signs (24h Range):  Temp:  [96.9 °F (36.1 °C)-98.2 °F (36.8 °C)] 97.8 °F (36.6 °C)  Pulse:  [] 99  Resp:  [12-20] 16  SpO2:  [95 %-100 %] 95 %  BP: (122-171)/() 132/74     Body mass index is 47.19 kg/m².    Physical Exam   Constitutional: She is oriented to person, place, and time. She appears well-developed and well-nourished. No distress.   HENT:   Head: Normocephalic and atraumatic.   Right Ear: External ear normal.   Left Ear: External ear normal.   Nose: Nose normal.   Eyes: Right eye exhibits no discharge. Left eye exhibits no discharge. No scleral icterus.   Neck: Normal range of motion.   Cardiovascular: Normal rate, regular rhythm and intact distal pulses.    Pulmonary/Chest: Effort normal. No respiratory distress. She has no wheezes.   Abdominal: Soft. She exhibits no distension. There is no tenderness.   Musculoskeletal: She exhibits no edema or tenderness.        Right hip: She exhibits decreased strength (2/2 pain).        Right knee: She exhibits  decreased range of motion (surgical dressing and ice pack intact).        Right ankle: She exhibits swelling.   Neurological: She is alert and oriented to person, place, and time. She exhibits normal muscle tone.   Skin: Skin is warm and dry. No rash noted.   Psychiatric: She has a normal mood and affect. Her behavior is normal. Thought content normal.   Vitals reviewed.    Diagnostic Results:   Labs: Reviewed  X-Ray: Reviewed

## 2017-06-01 NOTE — ANESTHESIA POST-OP PAIN MANAGEMENT
Acute Pain Service and Perioperative Surgical Home Progress Note    HPI  Isabel Dennis is a 62 y.o., female with PMHx DM2, Anxiety, chronic pain, GERD, HLD s/p R TKR on 5/31/17.     Past Medical History:   Diagnosis Date    Allergy     Anxiety     Arthritis     DDD (degenerative disc disease), cervical     Depression     Diabetes mellitus     Diabetes mellitus, type 2     Diverticulosis     GERD (gastroesophageal reflux disease)     Hx of colonic polyps     Lung disease     Other and unspecified hyperlipidemia     Post-traumatic osteoarthritis of both knees        Interval history  Patient seen and examined. No acute events. Pain noted over night, controlled with PNC and PRNs ordered, reports pain 8/10. Denies new complaints. Bolus 8cc provided via PNC pump.     Surgery:  Procedure(s) (LRB):  REPLACEMENT-KNEE-TOTAL (Right)    Post Op Day #: 1    Catheter type: Perineural Adductor Canal    Infusion type: Ropivacaine 0.2%  8 ml/hr basal    Problem List:    Active Hospital Problems    Diagnosis  POA    Primary osteoarthritis of right knee [M17.11]  Yes      Resolved Hospital Problems    Diagnosis Date Resolved POA   No resolved problems to display.       Subjective:       General appearance of alert, oriented, no complaints   Pain with rest: 6    Numbers   Pain with movement: 9    Numbers   Side Effects    1. Pruritis No    2. Nausea No    3. Motor Blockade No, 0=Ability to raise lower extremities off bed    4. Sedation No, 1=awake and alert    Schedule Medications:    acetaminophen  1,000 mg Oral Q6H    aspirin  325 mg Oral BID    carisoprodol  350 mg Oral TID    ceFAZolin (ANCEF) IVPB  2 g Intravenous Q8H    famotidine  20 mg Oral BID    morphine  15 mg Oral BID    polyethylene glycol  17 g Oral Daily    pregabalin  150 mg Oral QHS    senna-docusate 8.6-50 mg  1 tablet Oral BID        Continuous Infusions:   sodium chloride 0.9% 150 mL/hr at 05/31/17 1420    ropivacaine (PF) 2 mg/ml  (0.2%)          PRN Medications:  amitriptyline, bisacodyl, haloperidol lactate, lorazepam, morphine, morphine, morphine, naloxone, ondansetron, oxycodone, oxycodone, oxycodone, promethazine (PHENERGAN) IVPB, ramelteon, sodium chloride 0.9%       Antibiotics:  Antibiotics     Start     Stop Route Frequency Ordered    05/31/17 1930  cefazolin (ANCEF) 2 gram in dextrose 5% 50 mL IVPB (premix)      06/01 1459 IV Every 8 hours (non-standard times) 05/31/17 1431             Objective:     Catheter site clean, dry, intact          Vital Signs (Most Recent):  Temp: 36.6 °C (97.8 °F) (06/01/17 0447)  Pulse: 83 (06/01/17 0447)  Resp: 16 (06/01/17 0447)  BP: 132/74 (06/01/17 0447)  SpO2: 95 % (06/01/17 0447) Vital Signs Range (Last 24H):  Temp:  [36.1 °C (96.9 °F)-36.8 °C (98.3 °F)]   Pulse:  []   Resp:  [11-20]   BP: (122-200)/()   SpO2:  [95 %-100 %]          I & O (Last 24H):  Intake/Output Summary (Last 24 hours) at 06/01/17 0730  Last data filed at 05/31/17 1422   Gross per 24 hour   Intake             2740 ml   Output              100 ml   Net             2640 ml       Physical Exam:    GA: Alert, comfortable, no acute distress.   Pulmonary: Clear to auscultation A/P/L. No wheezing, crackles, or rhonchi.  Cardiac: RRR S1 & S2 w/o rubs/murmurs/gallops.   Abdominal:Bowel sounds present. No tenderness to palpation or distension. No appreciable hepatosplenomegaly.   Skin: No jaundice, rashes, or visible lesions.         Laboratory:  CBC: Recent Labs      05/31/17   0944   WBC  4.30   RBC  4.79   HGB  12.8   HCT  41.4   PLT  266   MCV  86   MCH  26.7*   MCHC  30.9*       BMP: Recent Labs      05/31/17   1340   NA  141   K  4.7   CO2  24   CL  106   BUN  15   CREATININE  0.8   GLU  125*   CALCIUM  9.1       No results for input(s): INR, PROTIME, APTT in the last 72 hours.    Invalid input(s): PT      Anticoagulant dose ASA 325mg BID    Assessment:         Pain control adequate    Plan:    - Pain:  Adductor canal  perineural catheter in place and infusing. Good level. Multimodal pain regimen with acetaminophen, Celebrex, Lyrica, and prn oxycodone given  Will continue to monitor. Plan to D/C perineural catheter in AM or home with On-Q if patient discharged today.  - DM2: Well controlled, HbA1c 6.7. POCT glucose QID, continue SSI. Diabetic diet.   - Anxiety: Continue Elavil BID PRN  - Chronic pain/muscle spasms: States she takes Opana 30mg qd at home. Continue MS Contin 15mg BID for now. Continue Soma 350mg TID.   - FEN/GI: Tolerating liquids, advance diet as tolerated. + flatus. - BM.  - Dispo: Pt working well with PT/OT. Case management and SW for placement. Plan for D/C tomorrow afternoon if patient works well with PT and meets goals, will need to pause PNC in the AM.           Evaluator Bandar Mccoy MD      Pt seen and examined with Dr. Mccoy.  Agree with above note.  Possible D/C to SNF today.

## 2017-06-01 NOTE — PLAN OF CARE
5:03 PM. SW set up transportation with [facility] [phone] to be here within the next 1.5-2 hours. Pt going to [facility] rm#[id number]. Nurse to call report to [phone]. Pts floor nurse Camilo [phone] updated and aware.     MARCELLUS Otto, [facility] [place] [facility]  [phone]

## 2017-06-01 NOTE — PT/OT/SLP PROGRESS
Physical Therapy  Treatment    Isabel Dennis   MRN: [id number]   Admitting Diagnosis: primary OA of (R) knee    PT Received On: 06/01/17  PT Start Time: 0940     PT Stop Time: 1020    PT Total Time (min): 40 min       Billable Minutes:  Gait [id number], Therapeutic Activity 10 and Therapeutic Exercise 20    Treatment Type: Treatment  PT/PTA: PT             General Precautions: Standard, fall  Orthopedic Precautions: RLE weight bearing as tolerated   Braces:           Subjective:  Communicated with nsg prior to session.  -Pt agreeable to PT session    Pain/Comfort  Pain Rating 1: 8/10  Location - Side 1: Right  Location - Orientation 1: anterior  Location 1: knee  Pain Addressed 1: Pre-medicate for activity, Reposition, Distraction  Pain Rating Post-Intervention 1: 10/10    Objective:   Patient found with: peripheral IV, Polar ice, FCD, perineural catheter, telemetry    Functional Mobility:  Bed Mobility:   Scooting/Bridging: Stand by Assistance  Supine to Sit: Contact Guard Assistance  Sit to Supine: Contact Guard Assistance    Transfers:  Sit <> Stand Assistance: Contact Guard Assistance x1 trial from EOB, x1 trial from chair, x1 trial from mat  Sit <> Stand Assistive Device: Rolling Walker  Bed <> Chair Technique: Stand Pivot  Bed <> Chair Assistance: Contact Guard Assistance  Bed <> Chair Assistive Device: Rolling Walker  Toilet Transfer Technique: Stand Pivot  Toilet Transfer Assistance: Contact Guard Assistance  Toilet Transfer Assistive Device: Rolling Walker    Gait:   Gait Distance: 50' x1 trial  Assistance 1: Contact Guard Assistance  Gait Assistive Device: Rolling walker  Gait Pattern: 3-point gait  Gait Deviation(s): decreased beatrice, increased time in double stance, decreased step length, decreased stride length, decreased velocity of limb motion, decreased toe-to-floor clearance, decreased weight-shifting ability    -Pt cued frequently for 3-point gait sequence and to slow down pace    Balance:   Static  Sit: FAIR+: Able to take MINIMAL challenges from all directions  Dynamic Sit: FAIR+: Maintains balance through MINIMAL excursions of active trunk motion  Static Stand: FAIR+: Takes MINIMAL challenges from all directions  Dynamic stand: FAIR: Needs CONTACT GUARD during gait     Therapeutic Activities and Exercises:  -Pt educated on:  A. PT POC and role of PT  B. Importance of OOB activity to improve functional outcomes after surgery  C. S/s associated with TKR procedure  D. Importance of re-gaining ROM early in acute phase  E. DME mgmt and gait/transfer sequencing  F. Performing HEP to reduce risk of developing blood clots  G. Car t/f     -Pt performed TKR protocol exercises x15 reps of:  A. AP  B. GS  C. QS  D. SAQ-AAROM  E. Heel slides-AAROM  F. Hip abd/add-AAROM  G. SLR-AAROM (only able to do 3 reps 2/2 pain)     -Pt cued for safety awareness throughout session    AM-PAC 6 CLICK MOBILITY  How much help from another person does this patient currently need?   1 = Unable, Total/Dependent Assistance  2 = A lot, Maximum/Moderate Assistance  3 = A little, Minimum/Contact Guard/Supervision  4 = None, Modified [place]/Independent    Turning over in bed (including adjusting bedclothes, sheets and blankets)?: 3  Sitting down on and standing up from a chair with arms (e.g., wheelchair, bedside commode, etc.): 3  Moving from lying on back to sitting on the side of the bed?: 3  Moving to and from a bed to a chair (including a wheelchair)?: 3  Need to walk in hospital room?: 3  Climbing 3-5 steps with a railing?: 3  Total Score: 18    AM-PAC Raw Score CMS G-Code Modifier Level of Impairment Assistance   6 % Total / Unable   7 - 9 CM 80 - 100% Maximal Assist   10 - 14 CL 60 - 80% Moderate Assist   15 - 19 CK 40 - 60% Moderate Assist   20 - 22 CJ 20 - 40% Minimal Assist   23 CI 1-20% SBA / CGA   24 CH 0% Independent/ Mod I     Patient left up in chair with all lines intact, call button in reach and nsg  notified.    Assessment:  Isabel Dennis is a 62 y.o. female with a medical diagnosis of primary OA of (R) knee and presents with impaired functional mobility. Pt tolerated session fairly today but reported increased pain throughout session. Pt with no LOB during ambulation and t/f's today using RW and was limited in distance 2/2 pain. Pt tolerated therex well within limits of pain tolerance. Pt will cont to benefit from skilled therapy services and remains appropriate for SNF placement 2/2 decreased caregiver support and 5 [facility] home.    Rehab identified problem list/impairments: Rehab identified problem list/impairments: weakness, impaired endurance, impaired self care skills, impaired functional mobilty, gait instability, decreased lower extremity function, pain, decreased safety awareness, decreased ROM, orthopedic precautions, impaired skin, impaired joint extensibility    Rehab potential is good.    Activity tolerance: Good    Discharge recommendations: Discharge Facility/Level Of Care Needs: nursing facility, skilled     Barriers to discharge: Barriers to Discharge: Inaccessible home environment, Decreased caregiver support    Equipment recommendations: Equipment Needed After Discharge: bedside commode, bath bench     GOALS:    Physical Therapy Goals        Problem: Physical Therapy Goal    Goal Priority Disciplines Outcome Goal Variances Interventions   Physical Therapy Goal     PT/OT, PT Ongoing (interventions implemented as appropriate)     Description:  Goals to be met by: 17     Patient will increase functional independence with mobility by performin. Supine to sit with Set-up [place]  2. Sit to supine with Set-up [place]  3. Sit to stand transfer with Stand-by Assistance  4. Gait  x 100 feet with CGA using Rolling Walker.   5. Ascend/descend 5 stair with bilateral Handrails Minimal Assistance   6. Lower extremity exercise program x 30 reps per handout, with independence                        PLAN:    Patient to be seen BID  to address the above listed problems via gait training, therapeutic activities, therapeutic exercises, neuromuscular re-education  Plan of Care expires: 07/01/17  Plan of Care reviewed with: patient         Todd Amy, PT  06/01/2017

## 2017-06-01 NOTE — ADDENDUM NOTE
Addendum  created 06/01/17 1702 by Kourtney Bailey RN    LDA properties accepted, Sign clinical note

## 2017-06-01 NOTE — CONSULTS
[facility]  Physical Medicine & Rehab  Consult Note    Patient Name: Isabel Dennis  MRN: [id number]  Admission Date: 5/31/2017  Hospital Length of Stay: 1 days  Collaborating Physician: Dick Arora MD    Consults  Subjective:     Principal Problem: R knee OA    HPI: Isabel Dennis is a 62-year-old female with PMHx of GERD, arthritis, obesity, DMII, HTN, and HLD.  Patient followed by ortho for worsening right knee pain failed by non-operative treatment.  She was admitted to [facility] on 5/31/17 for surgical intervention.  Now, s/p R TKA on 5/31/17.     Functional History: Patient lives in [facility] with her  and 13-year-old grandson in a single story home with 6 steps to enter.  Prior to admission, was independent/mod I with ADLs and mobility.  Ambulated PRN with RW.  DME: RW.    Hospital Course:   5/31/17:  Evaluated by therapy.  Bed mobility CGA.  Sit to stand Humphrey and SW.  Ambulated 3 steps forward and backwards Humphrey and SW.  UBD CGA and LBD totalA.  6/1/17:  Bed mobility SBA-CGA.  Sit to stand CGA and RW and transfers CGA and RW.  Ambulated 50 ft CGA and RW.     Past Medical History:   Diagnosis Date    Allergy     Anxiety     Arthritis     DDD (degenerative disc disease), cervical     Depression     Diabetes mellitus     Diabetes mellitus, type 2     Diverticulosis     GERD (gastroesophageal reflux disease)     Hx of colonic polyps     Lung disease     Other and unspecified hyperlipidemia     Post-traumatic osteoarthritis of both knees      Past Surgical History:   Procedure Laterality Date    ADENOIDECTOMY      COLONOSCOPY      epiglottis      4 times    HYSTERECTOMY      Partial, fibroids    KNEE SURGERY      NECK SURGERY      SPINE SURGERY      TONSILLECTOMY       Review of patient's allergies indicates:   Allergen Reactions    Iodinated contrast media - oral and iv dye Hives and Rash     As per history has problem if has IV extravasation       Scheduled  "Medications:    acetaminophen  1,000 mg Oral Q6H    aspirin  325 mg Oral BID    carisoprodol  350 mg Oral TID    famotidine  20 mg Oral BID    morphine  15 mg Oral TID    polyethylene glycol  17 g Oral Daily    pregabalin  150 mg Oral QHS    senna-docusate 8.6-50 mg  1 tablet Oral BID       PRN Medications: amitriptyline, bisacodyl, haloperidol lactate, lorazepam, morphine, morphine, morphine, naloxone, ondansetron, oxycodone, oxycodone, oxycodone, promethazine (PHENERGAN) IVPB, ramelteon, sodium chloride 0.9%    Family History     Problem Relation (Age of Onset)    Arthritis Mother    Cancer Father, Brother    Diabetes Paternal Aunt    Heart disease Father    Heart failure Brother (62)    Hypertension Father, Mother    Stroke Paternal Aunt        Social History Main Topics    Smoking status: Former Smoker     Packs/day: 1.00     Years: 20.00     Types: Cigarettes     Quit date: 1/26/2007    Smokeless tobacco: Never Used    Alcohol use Yes      Comment: "not enough to talk about "    Drug use: No    Sexual activity: No     Review of Systems   Constitutional: Negative for chills, fatigue and fever.   HENT: Negative for drooling, hearing loss, trouble swallowing and voice change.    Eyes: Negative for pain and visual disturbance.   Respiratory: Negative for cough, shortness of breath and wheezing.    Cardiovascular: Negative for chest pain and palpitations.   Gastrointestinal: Negative for abdominal distention, nausea and vomiting.   Genitourinary: Negative for difficulty urinating and flank pain.   Musculoskeletal: Positive for arthralgias, joint swelling and myalgias. Negative for back pain and neck pain.   Skin: Negative for rash and wound.   Neurological: Negative for dizziness, weakness, numbness and headaches.   Psychiatric/Behavioral: Negative for agitation and hallucinations. The patient is not nervous/anxious.      Objective:     Vital Signs (Most Recent):  Temp: 97.8 °F (36.6 °C) (06/01/17 " 0447)  Pulse: 99 (06/01/17 1040)  Resp: 16 (06/01/17 0447)  BP: 132/74 (06/01/17 0447)  SpO2: 95 % (06/01/17 0447)    Vital Signs (24h Range):  Temp:  [96.9 °F (36.1 °C)-98.2 °F (36.8 °C)] 97.8 °F (36.6 °C)  Pulse:  [] 99  Resp:  [12-20] 16  SpO2:  [95 %-100 %] 95 %  BP: (122-171)/() 132/74     Body mass index is 47.19 kg/m².    Physical Exam   Constitutional: She is oriented to person, place, and time. She appears well-developed and well-nourished. No distress.   HENT:   Head: Normocephalic and atraumatic.   Right Ear: External ear normal.   Left Ear: External ear normal.   Nose: Nose normal.   Eyes: Right eye exhibits no discharge. Left eye exhibits no discharge. No scleral icterus.   Neck: Normal range of motion.   Cardiovascular: Normal rate, regular rhythm and intact distal pulses.    Pulmonary/Chest: Effort normal. No respiratory distress. She has no wheezes.   Abdominal: Soft. She exhibits no distension. There is no tenderness.   Musculoskeletal: She exhibits no edema or tenderness.        Right hip: She exhibits decreased strength (2/2 pain).        Right knee: She exhibits decreased range of motion (surgical dressing and ice pack intact).        Right ankle: She exhibits swelling.   Neurological: She is alert and oriented to person, place, and time. She exhibits normal muscle tone.   Skin: Skin is warm and dry. No rash noted.   Psychiatric: She has a normal mood and affect. Her behavior is normal. Thought content normal.   Vitals reviewed.    Diagnostic Results:   Labs: Reviewed  X-Ray: Reviewed    Assessment/Plan:     Primary osteoarthritis of right knee    -  S/p R TKA on 5/31/17.   -  WBAT per ortho  -  Encourage mobility, OOB in chair at least 3 hours per day, and early ambulation as appropriate   -  Continue PT/OT   -  Pain management.  PNC per anesthesia.    -  DVT prophylaxis:  ANTHONY, SCD.  -  Reviewed discharge options with patient (inpatient rehab, SNF, home health therapy, and outpatient  therapy).  Encourage participation with therapy.            Patient with rehab goals.  Not ready for discharge today.  Will follow patient's progress and discuss with rehab team for final rehab recommendation.    Thank you for your consult.     BLAIRE Nolen  Department of Physical Medicine & Rehab  [facility]

## 2017-06-01 NOTE — PLAN OF CARE
Problem: Physical Therapy Goal  Goal: Physical Therapy Goal  Goals to be met by: 17     Patient will increase functional independence with mobility by performin. Supine to sit with Set-up [place]  2. Sit to supine with Set-up [place]  3. Sit to stand transfer with Stand-by Assistance  4. Gait  x 100 feet with CGA using Rolling Walker.   5. Ascend/descend 5 stair with bilateral Handrails Minimal Assistance   6. Lower extremity exercise program x 30 reps per handout, with independence     Outcome: Ongoing (interventions implemented as appropriate)  Pt tolerated session fairly today but reported increased pain throughout session. Pt with no LOB during ambulation and t/f's today using RW and was limited in distance 2/2 pain. Pt tolerated therex well within limits of pain tolerance. Pt will cont to benefit from skilled therapy services and remains appropriate for SNF placement 2/2 decreased caregiver support and 5 [facility] home.

## 2017-06-01 NOTE — PT/OT/SLP PROGRESS
"Occupational Therapy      Isabel Dennis  MRN: [id number]    Patient not seen today secondary to Pain (15/10 pt adamately refusing even bed level activity/ADLs. Pt requesting "morphine, norco and Soma." Pt was pre-medicated prior to OT arrival. ). Will follow-up.    MATT Rehman  6/1/2017  "

## 2017-06-01 NOTE — PLAN OF CARE
POD 1 s/p right TKA. PT/OT ordered to eval and treat. PT/OT recs pending. Patient currently lives with her spouse and grandson. Patient's spouse works during the day. Patient has good family support. CM completed discharge assessment and planning with patient. Patient verbalized understanding. All questions and concerns addressed. SW and CM will continue to follow for any additional needs. Plan A to discharge to inpatient rehab as soon as medically stable. Plan B to discharge to SNF. [facility]     PCP: Sadia De La Rosa MD    Pharmacy :  [facility] [address] [facility] [address]  Phone: [phone] Fax: [phone]    [facility] - [facility] [address] [facility] [address] [facility]  [address] [facility] [address]  Phone: [phone] Fax: [phone]    [facility]/pharmacy #[id number] - [place], [place] - [address] [facility] AT [address] [address]  Phone: [phone] Fax: [phone]    Payor: MEDICARE / Plan: MEDICARE PART A & B / Product Type: Government /

## 2017-06-02 ENCOUNTER — TELEPHONE (OUTPATIENT)
Dept: ORTHOPEDICS | Facility: CLINIC | Age: 62
End: 2017-06-02

## 2017-06-02 ENCOUNTER — PATIENT OUTREACH (OUTPATIENT)
Dept: ADMINISTRATIVE | Facility: CLINIC | Age: 62
End: 2017-06-02
Payer: MEDICARE

## 2017-06-02 PROCEDURE — 99306 1ST NF CARE HIGH MDM 50: CPT | Mod: ,,, | Performed by: HOSPITALIST

## 2017-06-02 PROCEDURE — 25000003 PHARM REV CODE 250: Performed by: HOSPITALIST

## 2017-06-02 PROCEDURE — 97165 OT EVAL LOW COMPLEX 30 MIN: CPT

## 2017-06-02 PROCEDURE — 12000000 HC SNF SEMI-PRIVATE ROOM

## 2017-06-02 PROCEDURE — 97162 PT EVAL MOD COMPLEX 30 MIN: CPT

## 2017-06-02 PROCEDURE — 97530 THERAPEUTIC ACTIVITIES: CPT

## 2017-06-02 PROCEDURE — 25000003 PHARM REV CODE 250: Performed by: STUDENT IN AN ORGANIZED HEALTH CARE EDUCATION/TRAINING PROGRAM

## 2017-06-02 RX ORDER — MORPHINE SULFATE 15 MG/1
15 TABLET, FILM COATED, EXTENDED RELEASE ORAL EVERY 12 HOURS
Status: DISCONTINUED | OUTPATIENT
Start: 2017-06-02 | End: 2017-06-04

## 2017-06-02 RX ORDER — POLYETHYLENE GLYCOL 3350 17 G/17G
17 POWDER, FOR SOLUTION ORAL DAILY
Status: DISCONTINUED | OUTPATIENT
Start: 2017-06-02 | End: 2017-06-07 | Stop reason: HOSPADM

## 2017-06-02 RX ORDER — AMITRIPTYLINE HYDROCHLORIDE 50 MG/1
50 TABLET, FILM COATED ORAL 2 TIMES DAILY
Status: DISCONTINUED | OUTPATIENT
Start: 2017-06-02 | End: 2017-06-07 | Stop reason: HOSPADM

## 2017-06-02 RX ORDER — ESOMEPRAZOLE MAGNESIUM 40 MG/1
40 CAPSULE, DELAYED RELEASE ORAL DAILY
COMMUNITY
End: 2017-09-14 | Stop reason: CLARIF

## 2017-06-02 RX ORDER — OXYMORPHONE HYDROCHLORIDE 30 MG/1
30 TABLET, FILM COATED, EXTENDED RELEASE ORAL EVERY 12 HOURS
COMMUNITY
End: 2017-09-14 | Stop reason: CLARIF

## 2017-06-02 RX ORDER — HYDROCODONE BITARTRATE AND ACETAMINOPHEN 10; 325 MG/1; MG/1
1 TABLET ORAL EVERY 4 HOURS PRN
Status: DISCONTINUED | OUTPATIENT
Start: 2017-06-02 | End: 2017-06-07 | Stop reason: HOSPADM

## 2017-06-02 RX ADMIN — AMITRIPTYLINE HYDROCHLORIDE 50 MG: 50 TABLET, FILM COATED ORAL at 08:06

## 2017-06-02 RX ADMIN — OXYCODONE HYDROCHLORIDE 10 MG: 5 TABLET ORAL at 01:06

## 2017-06-02 RX ADMIN — STANDARDIZED SENNA CONCENTRATE AND DOCUSATE SODIUM 1 TABLET: 8.6; 5 TABLET, FILM COATED ORAL at 09:06

## 2017-06-02 RX ADMIN — STANDARDIZED SENNA CONCENTRATE AND DOCUSATE SODIUM 1 TABLET: 8.6; 5 TABLET, FILM COATED ORAL at 08:06

## 2017-06-02 RX ADMIN — ASPIRIN 325 MG ORAL TABLET 325 MG: 325 PILL ORAL at 08:06

## 2017-06-02 RX ADMIN — MORPHINE SULFATE 15 MG: 15 TABLET, EXTENDED RELEASE ORAL at 01:06

## 2017-06-02 RX ADMIN — CARISOPRODOL 350 MG: 350 TABLET ORAL at 05:06

## 2017-06-02 RX ADMIN — OXYCODONE HYDROCHLORIDE 10 MG: 5 TABLET ORAL at 05:06

## 2017-06-02 RX ADMIN — POLYETHYLENE GLYCOL 3350 17 G: 17 POWDER, FOR SOLUTION ORAL at 03:06

## 2017-06-02 RX ADMIN — HYDROCODONE BITARTRATE AND ACETAMINOPHEN 1 TABLET: 10; 325 TABLET ORAL at 09:06

## 2017-06-02 RX ADMIN — HYDROCODONE BITARTRATE AND ACETAMINOPHEN 1 TABLET: 10; 325 TABLET ORAL at 05:06

## 2017-06-02 RX ADMIN — CARISOPRODOL 350 MG: 350 TABLET ORAL at 01:06

## 2017-06-02 RX ADMIN — ASPIRIN 325 MG ORAL TABLET 325 MG: 325 PILL ORAL at 09:06

## 2017-06-02 RX ADMIN — HYDROCODONE BITARTRATE AND ACETAMINOPHEN 1 TABLET: 10; 325 TABLET ORAL at 01:06

## 2017-06-02 RX ADMIN — AMITRIPTYLINE HYDROCHLORIDE 50 MG: 50 TABLET, FILM COATED ORAL at 09:06

## 2017-06-02 RX ADMIN — MORPHINE SULFATE 15 MG: 15 TABLET, EXTENDED RELEASE ORAL at 09:06

## 2017-06-02 RX ADMIN — CARISOPRODOL 350 MG: 350 TABLET ORAL at 09:06

## 2017-06-02 NOTE — ASSESSMENT & PLAN NOTE
Status post total right knee replacement 5/31/2017  Surgical wound to be managed by ortho NP while at SNF  Cont with current pain control   Cont bowel regimen for opioid induced constipation  Cont with PT/OT for gait training and strengthening and restoration of ADL's: WBAT per ortho   Fall precautions.   Cont DVT prophylaxis with ASA

## 2017-06-02 NOTE — NURSING
Pt was urinating 8 x negative for dysuria. Had to ambulate from bed to commode chair claimed that causes more pain. Pt was verbally abusive claimed the pain medication was not helping her and demanding for morphine and extra doses amitriptyline. Writer explained to her the medications she getting and her concerns would be address by the AP in am. But pt was still adamant she wanted to discharge herself in am. Charge Nurse made aware.

## 2017-06-02 NOTE — NURSING
Notified charge nurse that patient requested different pain medication, because pain not being relieved.

## 2017-06-02 NOTE — H&P
[facility]  History & Physical    Patient Name: Isabel Dennis  MRN: [id number]  Code Status: Full Code  Admission Date: 6/1/2017  Attending Physician: Lacy Grier MD   Primary Care Provider: Sadia De La Rosa MD    Subjective:     Principal Problem:Primary osteoarthritis of right knee    No chief complaint on file.       HPI: Chief Complaint/Reason for Admission: Debility    History of Present Illness:  Patient is a 62 y.o. female who has a past medical history of Anxiety; Arthritis; DDD; Depression; Diabetes mellitus type 2; Diverticulosis; GERD; hyperlipidemia presented with chronic right knee pain due to osteoarthritis. She has failed non surgical treatment and elected to undergo definitive surgical treatment. She underwent right total knee arthroplasty on 5/31/17. Patient tolerated procedure well with no significant post operative complications. Patient has been working with PT/OT who recommend SNF for further balance/mobility training. Patient lives at home with her  in single story home and prior to surgery was independent with ADL's. Patient complains of severe pain that is not controlled with the pain medications. She has been in pain since surgery. She has worked with therapy which has exacerbated her pain. She is tolerating diet without difficulties. Patient currently denies any fever/chills, chest pain, dyspnea, weakness, numbness, abdominal pain, nausea/vomiting, dysuria/hematuria, or weight loss.         Past Medical History:   Diagnosis Date    Allergy     Anxiety     Arthritis     DDD (degenerative disc disease), cervical     Depression     Diabetes mellitus     Diabetes mellitus, type 2     Diverticulosis     GERD (gastroesophageal reflux disease)     Hx of colonic polyps     Lung disease     Other and unspecified hyperlipidemia     Post-traumatic osteoarthritis of both knees        Past Surgical History:   Procedure Laterality Date    ADENOIDECTOMY       COLONOSCOPY      epiglottis      4 times    HYSTERECTOMY      Partial, fibroids    KNEE SURGERY      NECK SURGERY      SPINE SURGERY      TONSILLECTOMY         Review of patient's allergies indicates:   Allergen Reactions    Iodinated contrast media - oral and iv dye Hives and Rash     As per history has problem if has IV extravasation       Current Facility-Administered Medications on File Prior to Encounter   Medication    [] 0.9%  NaCl infusion    [DISCONTINUED] acetaminophen tablet 1,000 mg    [DISCONTINUED] amitriptyline tablet 50 mg    [DISCONTINUED] aspirin EC tablet 325 mg    [DISCONTINUED] bisacodyl suppository 10 mg    [DISCONTINUED] carisoprodol tablet 350 mg    [DISCONTINUED] famotidine tablet 20 mg    [DISCONTINUED] haloperidol lactate injection 2 mg    [DISCONTINUED] lorazepam injection 0.5 mg    [DISCONTINUED] morphine injection 2 mg    [DISCONTINUED] morphine injection 2 mg    [DISCONTINUED] morphine injection 2 mg    [DISCONTINUED] morphine tablet 15 mg    [DISCONTINUED] morphine tablet 15 mg    [DISCONTINUED] naloxone 0.4 mg/mL injection 0.02 mg    [DISCONTINUED] ondansetron injection 4 mg    [DISCONTINUED] oxycodone immediate release tablet 10 mg    [DISCONTINUED] oxycodone immediate release tablet 15 mg    [DISCONTINUED] oxycodone immediate release tablet 20 mg    [DISCONTINUED] polyethylene glycol packet 17 g    [DISCONTINUED] pregabalin capsule 150 mg    [DISCONTINUED] promethazine (PHENERGAN) 6.25 mg in dextrose 5 % 50 mL IVPB    [DISCONTINUED] ramelteon tablet 8 mg    [DISCONTINUED] ropivacaine (PF) 2 mg/ml (0.2%) infusion    [DISCONTINUED] senna-docusate 8.6-50 mg per tablet 1 tablet    [DISCONTINUED] sodium chloride 0.9% flush 3 mL     Current Outpatient Prescriptions on File Prior to Encounter   Medication Sig    AMITIZA 24 mcg Cap Take by mouth as needed.     amitriptyline (ELAVIL) 50 MG tablet Take 50 mg by mouth 2 (two) times daily as needed.      aspirin 325 MG tablet Take 1 tablet (325 mg total) by mouth 2 (two) times daily.    b complex vitamins tablet Take 1 tablet by mouth once daily.    carisoprodol (SOMA) 350 MG tablet Take 350 mg by mouth 3 (three) times daily.      CINNAMON BARK (CINNAMON ORAL) Take by mouth Daily.    cod liver oil Oil Take by mouth Daily.     docusate sodium (COLACE) 100 MG capsule Take 1 capsule (100 mg total) by mouth 2 (two) times daily as needed for Constipation.    ergocalciferol (VITAMIN D2) 50,000 unit Cap Take 1 capsule (50,000 Units total) by mouth twice a week.    esomeprazole (NEXIUM) 20 MG capsule Take 20 mg by mouth as needed.     fish oil-omega-3 fatty acids 300-1,000 mg capsule Take 2 g by mouth once daily.    FLAXSEED OIL ORAL Take 1,000 mg by mouth once daily. Pt states takes 4 daily, total of 4000 mg daily    fluticasone (FLONASE) 50 mcg/actuation nasal spray 2 sprays by Each Nare route once daily. (Patient taking differently: 2 sprays by Each Nare route once daily. As needed)    FREESTYLE LITE STRIPS Strp     ibuprofen (ADVIL,MOTRIN) 800 MG tablet Take 800 mg by mouth 2 (two) times daily.     naproxen (NAPROSYN) 500 MG tablet     NORCO  mg Tab Take 1 tablet by mouth 4 (four) times daily.     oxycodone-acetaminophen (PERCOCET)  mg per tablet Take 1 tablet by mouth every 4 to 6 hours as needed for Pain.    oxyMORphone (OPANA ER) 20 MG 12 hr tablet Take 30 mg by mouth Daily.     potassium chloride SA (K-DUR,KLOR-CON) 20 MEQ tablet Take 20 mEq by mouth once daily.     promethazine (PHENERGAN) 25 MG tablet Take 1 tablet (25 mg total) by mouth every 6 (six) hours as needed for Nausea.    PSYLLIUM HUSK (METAMUCIL ORAL) Take by mouth Daily.     Family History     Problem Relation (Age of Onset)    Arthritis Mother    Cancer Father, Brother    Diabetes Paternal Aunt    Heart disease Father    Heart failure Brother (62)    Hypertension Father, Mother    Stroke Paternal Aunt        Social  "History Main Topics    Smoking status: Former Smoker     Packs/day: 1.00     Years: 20.00     Types: Cigarettes     Quit date: 1/26/2007    Smokeless tobacco: Never Used    Alcohol use Yes      Comment: "not enough to talk about "    Drug use: No    Sexual activity: No     Review of Systems   Constitutional: Negative for chills, fatigue and fever.   HENT: Negative for congestion, facial swelling, hearing loss and trouble swallowing.    Eyes: Negative for photophobia and visual disturbance.   Respiratory: Negative for chest tightness, shortness of breath and wheezing.    Cardiovascular: Negative for chest pain, palpitations and leg swelling.   Gastrointestinal: Negative for abdominal pain, blood in stool, constipation, diarrhea, nausea and vomiting.   Endocrine: Negative.    Genitourinary: Negative.    Musculoskeletal: Negative for back pain, joint swelling and myalgias.   Skin: Negative.    Allergic/Immunologic: Negative.    Neurological: Negative for dizziness, facial asymmetry, speech difficulty, weakness and numbness.   Hematological: Negative.    Psychiatric/Behavioral: Negative for agitation, confusion and dysphoric mood. The patient is not nervous/anxious.      Objective:     Vital Signs (Most Recent):  Temp: 97.8 °F (36.6 °C) (06/02/17 0820)  Pulse: 89 (06/02/17 0820)  Resp: 16 (06/02/17 0820)  BP: (!) 165/93 (06/02/17 0820)  SpO2: 95 % (06/02/17 0820) Vital Signs (24h Range):  Temp:  [97.6 °F (36.4 °C)-99 °F (37.2 °C)] 97.8 °F (36.6 °C)  Pulse:  [] 89  Resp:  [16-18] 16  SpO2:  [95 %-99 %] 95 %  BP: (141-165)/(74-93) 165/93     Weight: 119.8 kg (264 lb 1.8 oz)  Body mass index is 48.31 kg/m².    Physical Exam   Constitutional: She is oriented to person, place, and time. Vital signs are normal. She appears well-developed and well-nourished.  Non-toxic appearance. She does not appear ill. No distress.   HENT:   Head: Normocephalic and atraumatic.   Eyes: Conjunctivae and EOM are normal. Pupils are " equal, round, and reactive to light. No scleral icterus.   Neck: Normal range of motion and full passive range of motion without pain. Neck supple. No JVD present. Carotid bruit is not present. No thyromegaly present.   Cardiovascular: Normal rate, regular rhythm, S1 normal, S2 normal, normal heart sounds and normal pulses.  Exam reveals no gallop, no S3, no S4 and no friction rub.    No murmur heard.  Pulmonary/Chest: Effort normal and breath sounds normal. No accessory muscle usage. No tachypnea. No respiratory distress. She has no decreased breath sounds. She has no wheezes. She has no rhonchi. She has no rales.   Abdominal: Soft. Normal appearance and bowel sounds are normal. She exhibits no shifting dullness, no distension, no abdominal bruit and no ascites. There is no hepatosplenomegaly. There is no tenderness. There is no rigidity and no guarding.   Musculoskeletal: She exhibits no edema.        Right knee: She exhibits decreased range of motion and swelling. Tenderness found.        Legs:  Neurological: She is alert and oriented to person, place, and time. She has normal strength. She is not disoriented. No cranial nerve deficit or sensory deficit. GCS eye subscore is 4. GCS verbal subscore is 5. GCS motor subscore is 6.   Skin: Skin is warm, dry and intact. No abrasion and no lesion noted.   Psychiatric: She has a normal mood and affect. Her behavior is normal. Judgment and thought content normal. Her mood appears not anxious. Her speech is not slurred. She is not actively hallucinating. Cognition and memory are normal. She does not exhibit a depressed mood. She is attentive.       Significant Labs: All pertinent labs within the past 24 hours have been reviewed.    Significant Imaging: I have reviewed all pertinent imaging results/findings within the past 24 hours.    Assessment/Plan:     Status post total right knee replacement 5/31/2017    Plan as above.        Edema    Low Na diet  ANTHONY hose  Leg  elevation           Patient is [employer]    Cont to monitor H/H.         Morbid obesity    Low fat diet and gradual exercise.         GERD (gastroesophageal reflux disease)    Cont Protonix to treat.         Allergic rhinitis, seasonal    Cont Flonase to treat.         Diabetes mellitus, type 2    Not treatment at home.  Cont ADA diet  Cont BG checks  SSI as needed.         * Primary osteoarthritis of right knee    Status post total right knee replacement 5/31/2017  Surgical wound to be managed by ortho NP while at SNF  Cont with current pain control   Cont bowel regimen for opioid induced constipation  Cont with PT/OT for gait training and strengthening and restoration of ADL's: WBAT per ortho   Fall precautions.   Cont DVT prophylaxis with VELVET Cervantes MD  [facility]

## 2017-06-02 NOTE — SUBJECTIVE & OBJECTIVE
Past Medical History:   Diagnosis Date    Allergy     Anxiety     Arthritis     DDD (degenerative disc disease), cervical     Depression     Diabetes mellitus     Diabetes mellitus, type 2     Diverticulosis     GERD (gastroesophageal reflux disease)     Hx of colonic polyps     Lung disease     Other and unspecified hyperlipidemia     Post-traumatic osteoarthritis of both knees        Past Surgical History:   Procedure Laterality Date    ADENOIDECTOMY      COLONOSCOPY      epiglottis      4 times    HYSTERECTOMY      Partial, fibroids    KNEE SURGERY      NECK SURGERY      SPINE SURGERY      TONSILLECTOMY         Review of patient's allergies indicates:   Allergen Reactions    Iodinated contrast media - oral and iv dye Hives and Rash     As per history has problem if has IV extravasation       Current Facility-Administered Medications on File Prior to Encounter   Medication    [] 0.9%  NaCl infusion    [DISCONTINUED] acetaminophen tablet 1,000 mg    [DISCONTINUED] amitriptyline tablet 50 mg    [DISCONTINUED] aspirin EC tablet 325 mg    [DISCONTINUED] bisacodyl suppository 10 mg    [DISCONTINUED] carisoprodol tablet 350 mg    [DISCONTINUED] famotidine tablet 20 mg    [DISCONTINUED] haloperidol lactate injection 2 mg    [DISCONTINUED] lorazepam injection 0.5 mg    [DISCONTINUED] morphine injection 2 mg    [DISCONTINUED] morphine injection 2 mg    [DISCONTINUED] morphine injection 2 mg    [DISCONTINUED] morphine tablet 15 mg    [DISCONTINUED] morphine tablet 15 mg    [DISCONTINUED] naloxone 0.4 mg/mL injection 0.02 mg    [DISCONTINUED] ondansetron injection 4 mg    [DISCONTINUED] oxycodone immediate release tablet 10 mg    [DISCONTINUED] oxycodone immediate release tablet 15 mg    [DISCONTINUED] oxycodone immediate release tablet 20 mg    [DISCONTINUED] polyethylene glycol packet 17 g    [DISCONTINUED] pregabalin capsule 150 mg    [DISCONTINUED] promethazine  (PHENERGAN) 6.25 mg in dextrose 5 % 50 mL IVPB    [DISCONTINUED] ramelteon tablet 8 mg    [DISCONTINUED] ropivacaine (PF) 2 mg/ml (0.2%) infusion    [DISCONTINUED] senna-docusate 8.6-50 mg per tablet 1 tablet    [DISCONTINUED] sodium chloride 0.9% flush 3 mL     Current Outpatient Prescriptions on File Prior to Encounter   Medication Sig    AMITIZA 24 mcg Cap Take by mouth as needed.     amitriptyline (ELAVIL) 50 MG tablet Take 50 mg by mouth 2 (two) times daily as needed.     aspirin 325 MG tablet Take 1 tablet (325 mg total) by mouth 2 (two) times daily.    b complex vitamins tablet Take 1 tablet by mouth once daily.    carisoprodol (SOMA) 350 MG tablet Take 350 mg by mouth 3 (three) times daily.      CINNAMON BARK (CINNAMON ORAL) Take by mouth Daily.    cod liver oil Oil Take by mouth Daily.     docusate sodium (COLACE) 100 MG capsule Take 1 capsule (100 mg total) by mouth 2 (two) times daily as needed for Constipation.    ergocalciferol (VITAMIN D2) 50,000 unit Cap Take 1 capsule (50,000 Units total) by mouth twice a week.    esomeprazole (NEXIUM) 20 MG capsule Take 20 mg by mouth as needed.     fish oil-omega-3 fatty acids 300-1,000 mg capsule Take 2 g by mouth once daily.    FLAXSEED OIL ORAL Take 1,000 mg by mouth once daily. Pt states takes 4 daily, total of 4000 mg daily    fluticasone (FLONASE) 50 mcg/actuation nasal spray 2 sprays by Each Nare route once daily. (Patient taking differently: 2 sprays by Each Nare route once daily. As needed)    FREESTYLE LITE STRIPS Strp     ibuprofen (ADVIL,MOTRIN) 800 MG tablet Take 800 mg by mouth 2 (two) times daily.     naproxen (NAPROSYN) 500 MG tablet     NORCO  mg Tab Take 1 tablet by mouth 4 (four) times daily.     oxycodone-acetaminophen (PERCOCET)  mg per tablet Take 1 tablet by mouth every 4 to 6 hours as needed for Pain.    oxyMORphone (OPANA ER) 20 MG 12 hr tablet Take 30 mg by mouth Daily.     potassium chloride SA  "(K-DUR,KLOR-CON) 20 MEQ tablet Take 20 mEq by mouth once daily.     promethazine (PHENERGAN) 25 MG tablet Take 1 tablet (25 mg total) by mouth every 6 (six) hours as needed for Nausea.    PSYLLIUM HUSK (METAMUCIL ORAL) Take by mouth Daily.     Family History     Problem Relation (Age of Onset)    Arthritis Mother    Cancer Father, Brother    Diabetes Paternal Aunt    Heart disease Father    Heart failure Brother (62)    Hypertension Father, Mother    Stroke Paternal Aunt        Social History Main Topics    Smoking status: Former Smoker     Packs/day: 1.00     Years: 20.00     Types: Cigarettes     Quit date: 1/26/2007    Smokeless tobacco: Never Used    Alcohol use Yes      Comment: "not enough to talk about "    Drug use: No    Sexual activity: No     Review of Systems   Constitutional: Negative for chills, fatigue and fever.   HENT: Negative for congestion, facial swelling, hearing loss and trouble swallowing.    Eyes: Negative for photophobia and visual disturbance.   Respiratory: Negative for chest tightness, shortness of breath and wheezing.    Cardiovascular: Negative for chest pain, palpitations and leg swelling.   Gastrointestinal: Negative for abdominal pain, blood in stool, constipation, diarrhea, nausea and vomiting.   Endocrine: Negative.    Genitourinary: Negative.    Musculoskeletal: Negative for back pain, joint swelling and myalgias.   Skin: Negative.    Allergic/Immunologic: Negative.    Neurological: Negative for dizziness, facial asymmetry, speech difficulty, weakness and numbness.   Hematological: Negative.    Psychiatric/Behavioral: Negative for agitation, confusion and dysphoric mood. The patient is not nervous/anxious.      Objective:     Vital Signs (Most Recent):  Temp: 97.8 °F (36.6 °C) (06/02/17 0820)  Pulse: 89 (06/02/17 0820)  Resp: 16 (06/02/17 0820)  BP: (!) 165/93 (06/02/17 0820)  SpO2: 95 % (06/02/17 0820) Vital Signs (24h Range):  Temp:  [97.6 °F (36.4 °C)-99 °F (37.2 °C)] " 97.8 °F (36.6 °C)  Pulse:  [] 89  Resp:  [16-18] 16  SpO2:  [95 %-99 %] 95 %  BP: (141-165)/(74-93) 165/93     Weight: 119.8 kg (264 lb 1.8 oz)  Body mass index is 48.31 kg/m².    Physical Exam   Constitutional: She is oriented to person, place, and time. Vital signs are normal. She appears well-developed and well-nourished.  Non-toxic appearance. She does not appear ill. No distress.   HENT:   Head: Normocephalic and atraumatic.   Eyes: Conjunctivae and EOM are normal. Pupils are equal, round, and reactive to light. No scleral icterus.   Neck: Normal range of motion and full passive range of motion without pain. Neck supple. No JVD present. Carotid bruit is not present. No thyromegaly present.   Cardiovascular: Normal rate, regular rhythm, S1 normal, S2 normal, normal heart sounds and normal pulses.  Exam reveals no gallop, no S3, no S4 and no friction rub.    No murmur heard.  Pulmonary/Chest: Effort normal and breath sounds normal. No accessory muscle usage. No tachypnea. No respiratory distress. She has no decreased breath sounds. She has no wheezes. She has no rhonchi. She has no rales.   Abdominal: Soft. Normal appearance and bowel sounds are normal. She exhibits no shifting dullness, no distension, no abdominal bruit and no ascites. There is no hepatosplenomegaly. There is no tenderness. There is no rigidity and no guarding.   Musculoskeletal: She exhibits no edema.        Right knee: She exhibits decreased range of motion and swelling. Tenderness found.        Legs:  Neurological: She is alert and oriented to person, place, and time. She has normal strength. She is not disoriented. No cranial nerve deficit or sensory deficit. GCS eye subscore is 4. GCS verbal subscore is 5. GCS motor subscore is 6.   Skin: Skin is warm, dry and intact. No abrasion and no lesion noted.   Psychiatric: She has a normal mood and affect. Her behavior is normal. Judgment and thought content normal. Her mood appears not  anxious. Her speech is not slurred. She is not actively hallucinating. Cognition and memory are normal. She does not exhibit a depressed mood. She is attentive.       Significant Labs: All pertinent labs within the past 24 hours have been reviewed.    Significant Imaging: I have reviewed all pertinent imaging results/findings within the past 24 hours.

## 2017-06-02 NOTE — CLINICAL REVIEW
Clinical Pharmacy Chart Review Note    Admit Date: 6/1/2017   LOS: 1 day     Isabel Dennis is a 62 y.o. female admitted to SNF for PT/OT after hospitalization for primary osteoarthritis of right knee.    Active Hospital Problems    Diagnosis  POA    Status post total right knee replacement 5/31/2017 [Z96.651]  Not Applicable      Resolved Hospital Problems    Diagnosis Date Resolved POA   No resolved problems to display.     Review of patient's allergies indicates:   Allergen Reactions    Iodinated contrast media - oral and iv dye Hives and Rash     As per history has problem if has IV extravasation     Patient Active Problem List    Diagnosis Date Noted    Primary osteoarthritis of right knee 05/31/2017    Status post total right knee replacement 5/31/2017 05/31/2017    Chronic, continuous use of opioids 05/17/2017    Patient is Evangelical 05/17/2017    Constipation 05/17/2017    Thrombocytopenia 05/17/2017    Tattoos 05/17/2017    Edema 05/17/2017    Dysphonia 06/09/2014    Morbid obesity 02/18/2014    Diabetes mellitus, type 2 01/08/2013    Hx of colonic polyps 01/08/2013    Osteoarthritis of more than one site 01/08/2013    Allergic rhinitis, seasonal 01/08/2013    Depression with anxiety 01/08/2013    GERD (gastroesophageal reflux disease) 01/08/2013    DDD (degenerative disc disease), cervical 01/08/2013       Scheduled Meds:    aspirin  325 mg Oral BID    carisoprodol  350 mg Oral TID    fluticasone  2 spray Each Nare Daily    pantoprazole  40 mg Oral Daily    potassium chloride SA  20 mEq Oral Daily    senna-docusate 8.6-50 mg  1 tablet Oral BID     Continuous Infusions:    PRN Meds: acetaminophen, aluminum-magnesium hydroxide-simethicone, amitriptyline, docusate sodium, metoclopramide HCl, oxycodone, oxycodone, ramelteon    OBJECTIVE:     Vital Signs (Last 24H)  Temp:  [97.6 °F (36.4 °C)-99 °F (37.2 °C)]   Pulse:  []   Resp:  [16-18]   BP: (141-165)/(74-93)   SpO2:   [95 %-99 %]     Laboratory:  CBC:   Recent Labs  Lab 05/31/17  0944 06/01/17  0944   WBC 4.30  --    RBC 4.79  --    HGB 12.8 9.7*   HCT 41.4  --      --    MCV 86  --    MCH 26.7*  --    MCHC 30.9*  --      BMP:   Recent Labs  Lab 05/31/17  1340   *      K 4.7      CO2 24   BUN 15   CREATININE 0.8   CALCIUM 9.1     CMP:   Recent Labs  Lab 05/31/17  1340   *   CALCIUM 9.1      K 4.7   CO2 24      BUN 15   CREATININE 0.8     LFTs: No results for input(s): ALT, AST, ALKPHOS, BILITOT, PROT, ALBUMIN in the last 168 hours.  Coagulation: No results for input(s): INR, APTT in the last 168 hours.    Invalid input(s): PT  Cardiac markers: No results for input(s): CKMB, TROPONINT, MYOGLOBIN in the last 168 hours.  ABGs: No results for input(s): PH, PCO2, PO2, HCO3, POCSATURATED, BE in the last 168 hours.  Microbiology Results (last 7 days)     ** No results found for the last 168 hours. **        Specimen (12h ago through future)    None        No results for input(s): COLORU, CLARITYU, SPECGRAV, PHUR, PROTEINUA, GLUCOSEU, BILIRUBINCON, BLOODU, WBCU, RBCU, BACTERIA, MUCUS, NITRITE, LEUKOCYTESUR, UROBILINOGEN, HYALINECASTS in the last 168 hours.  Others: No results for input(s): [id number], TSH, T4FREE, LABLIPI in the last 168 hours.    Invalid input(s): A1C    ASSESSMENT/PLAN:      Primary osteoarthritis of right knee  --s/p total right knee replacement on 5/31/2017  --PT/OT  --pain management: norco  mg q4h prn severe pain, carisoprodol 350 mg TID    --bowel regimen for constipation; hold for loose or frequent stools  --DVT prophylaxis:  mg BID (6/30/17 30-days post-op)   Monitor Hgb 9.7, Hct 41.1    Constipation  --docusate 100 mg BID prn constipation, senna-docusate 8.6-50 mg BID, consider adding miralax 17 g daily prn constipation to bowel regimen     Diabetes, mellitus, type 2  Lab Results   Component Value Date    HGBA1C 6.7 (H) 05/17/2017     POCT Glucose    Date Value Ref Range Status   05/31/2017 143 (H) 70 - 110 mg/dL Final   05/31/2017 114 (H) 70 - 110 mg/dL Final   --ADA diet controlled, not on chronic medications at home    Morbid obesity  BMI 48.31    Allergic rhinitis, seasonal  --flonase 50 mcg/actuation nasal 2 sprays daily     Depression with anxiety  --amitriptyline 50 mg BID   Monitor: mental status for depression, suicide ideation, anxiety, serotonin syndrome    GERD (gastroesophageal reflux disease)  --pantoprazole 40 mg daily     Medications needing a diagnosis:   --potassium chloride SA CR 20 mEq daily; K+ 4.7. Consider discontinuing     Monitor BMP/CBC/Vitals

## 2017-06-02 NOTE — NURSING
"Pt was given her oxy 10 mg and carisoprodol pt talked to this  writer " Is this tylenol? Writer replied" that's your oxy and carisoprodol" pt replied I thought its tylenol because that's the only medication you are giving to me tonight. Writer just left room the room but pt was saying " I am not your black slave and you would treat like this and i am going home today" . Charge nurse made aware.  "

## 2017-06-02 NOTE — NURSING
Pt asked for pain medication writer told her she's not due yet offer her tylenol but latter refused she claimed it wont help with her pain and she has lots of tylenol at home and tylenol will destroy her liver. PCT told this writer that pt told her if her color of her skin was white she wouldn't get this kind of treatment and she would go home this am.

## 2017-06-02 NOTE — PLAN OF CARE
Problem: Occupational Therapy Goal  Goal: Occupational Therapy Goal  Outcome: Ongoing (interventions implemented as appropriate)  Goals to be met by: 14 days    Patient will increase functional independence with ADLs by performing:    UE Dressing with [place].  LE Dressing with Modified [place].  Grooming while standing with Modified [place].  Toileting from toilet with Modified [place] for hygiene and clothing management.   Bathing from  tub bench with Supervision.  Toilet transfer to toilet with Modified [place].   Complete ADL tasks and functional mobility with good safety awareness % of the time to decrease risk of falls.    Comments: MATT Ayers  6/2/2017

## 2017-06-02 NOTE — TELEPHONE ENCOUNTER
----- Message from Adeline Livingston sent at 6/2/2017  9:57 AM CDT -----  Contact: [phone]  Daughter called in stating that she spoke with Silvina and is asking for a call back to discuss the pain that Pt is in. Daughter is upset. Please return call as soon as possible.    Thank you

## 2017-06-02 NOTE — PLAN OF CARE
Problem: Physical Therapy Goal  Goal: Physical Therapy Goal  Goals to be met by: 14 days     Patient will increase functional independence with mobility by performin. Sit to stand transfer with Modified [place]  2. Bed to chair transfer with Modified [place] using Rolling Walker  3. Gait  x 50 feet with Supervision using Rolling Walker.   4. Wheelchair propulsion x150 feet with Modified [place] using bilateral uppper extremities  5. Ascend/descend 6 stair with bilateral Handrails Minimal Assistance  6. Stand for 3 minutes with Modified [place] using Rolling Walker  7. Increased functional strength to 5/5 for BLE except R knee extension.  8. Lower extremity exercise program x20 reps per handout, with independence    Outcome: Ongoing (interventions implemented as appropriate)  Goals set today

## 2017-06-02 NOTE — PROGRESS NOTES
C3 nurse attempted to conduct a POST ACUTE 1 call with [facility].  Spoke with Sharda, charge nurse at [facility].  She is unable to conduct call at this time and requests to return call.

## 2017-06-02 NOTE — DISCHARGE SUMMARY
[facility]  Discharge Summary      Admit Date: 5/31/2017    Discharge Date and Time:  6/1/17    Attending Physician: [facility], Jr    Reason for Admission: right knee arthritis    Procedures Performed: Procedure(s) (LRB):  REPLACEMENT-KNEE-TOTAL (Right)    Hospital Course (synopsis of major diagnoses, care, treatment, and services provided during the course of the hospital stay): On 5/31/2017, the patient arrived to the [facility] for proper pre-operative management.  Upon completion of pre-operative preparation, the patient was taken back to the operative theatre.  A right total knee replacement was performed without complication and the patient was transported to the post anesthesia care unit in stable condition.  After appropriate recovery from the anaesthetic agents used during the surgery, the patient was then transported to the hospital inpatient floor.  The interim of the hospital stay from arrival on the floor up to discharge has been uncomplicated. The patient has experienced minimal electrolyte imbalances that have been repleated accordingly.  The patient's diet has progressed to a regular diet with no nausea or vomiting.  The patient's pain has been controlled using a multimodal approach with the help of the anesthesia pain service. Currently, the patient's pain is well controlled on an oral regimen.  The patient has been voiding without difficulty ever since surgery.  The patient began participation in physical therapy on post-operative day one and has progressed throughout the entire hospital stay.  Currently the patient is ambulating with moderate assistance and the physical therapy team feels that the patient's progress is sufficient to allow the patient to be discharged to SNF safely.  The patient agrees with this assessment and desires a discharge to SNF today.       Consults: PT, OT and anesthesia    Significant Diagnostic Studies: Labs:   BMP:   Recent  Labs  Lab 05/31/17  1340   *      K 4.7      CO2 24   BUN 15   CREATININE 0.8   CALCIUM 9.1    and CBC   Recent Labs  Lab 05/31/17  0944 06/01/17  0944   WBC 4.30  --    HGB 12.8 9.7*   HCT 41.4  --      --      Radiology: X-Ray: right knee    Final Diagnoses:    Principal Problem: Primary osteoarthritis of right knee   Secondary Diagnoses:   Active Hospital Problems    Diagnosis  POA    *Primary osteoarthritis of right knee [M17.11]  Yes      Resolved Hospital Problems    Diagnosis Date Resolved POA   No resolved problems to display.       Discharged Condition: stable    Disposition: Skilled nursing facility    Follow Up/Patient Instructions:     Medications:  Reconciled Home Medications:   Discharge Medication List as of 6/1/2017  6:49 PM      START taking these medications    Details   aspirin 325 MG tablet Take 1 tablet (325 mg total) by mouth 2 (two) times daily., Starting Wed 5/31/2017, Until Fri 6/30/2017, Print      docusate sodium (COLACE) 100 MG capsule Take 1 capsule (100 mg total) by mouth 2 (two) times daily as needed for Constipation., Starting Wed 5/31/2017, Print      oxycodone-acetaminophen (PERCOCET)  mg per tablet Take 1 tablet by mouth every 4 to 6 hours as needed for Pain., Starting Wed 5/31/2017, Until Sat 6/10/2017, Print      promethazine (PHENERGAN) 25 MG tablet Take 1 tablet (25 mg total) by mouth every 6 (six) hours as needed for Nausea., Starting Wed 5/31/2017, Print         CONTINUE these medications which have NOT CHANGED    Details   AMITIZA 24 mcg Cap Take by mouth as needed. , Starting 8/3/2016, Until Discontinued, Historical Med      amitriptyline (ELAVIL) 50 MG tablet Take 50 mg by mouth 2 (two) times daily as needed. , Until Discontinued, Historical Med      b complex vitamins tablet Take 1 tablet by mouth once daily., Until Discontinued, Historical Med      carisoprodol (SOMA) 350 MG tablet Take 350 mg by mouth 3 (three) times daily.  , Until  Discontinued, Historical Med      CINNAMON BARK (CINNAMON ORAL) Take by mouth Daily., Until Discontinued, Historical Med      cod liver oil Oil Take by mouth Daily. , Until Discontinued, Historical Med      ergocalciferol (VITAMIN D2) 50,000 unit Cap Take 1 capsule (50,000 Units total) by mouth twice a week., Starting 4/7/2014, Until Discontinued, Normal      esomeprazole (NEXIUM) 20 MG capsule Take 20 mg by mouth as needed. , Until Discontinued, Historical Med      fish oil-omega-3 fatty acids 300-1,000 mg capsule Take 2 g by mouth once daily., Until Discontinued, Historical Med      FLAXSEED OIL ORAL Take 1,000 mg by mouth once daily. Pt states takes 4 daily, total of 4000 mg daily, Until Discontinued, Historical Med      fluticasone (FLONASE) 50 mcg/actuation nasal spray 2 sprays by Each Nare route once daily., Starting 2/25/2014, Until Discontinued, Normal      FREESTYLE LITE STRIPS Strp Starting 5/4/2014, Until Discontinued, Historical Med      ibuprofen (ADVIL,MOTRIN) 800 MG tablet Take 800 mg by mouth 2 (two) times daily. , Starting 12/27/2013, Until Discontinued, Historical Med      naproxen (NAPROSYN) 500 MG tablet Starting 4/1/2014, Until Discontinued, Historical Med      NORCO  mg Tab Take 1 tablet by mouth 4 (four) times daily. , Starting 5/10/2014, Until Discontinued, Historical Med      oxyMORphone (OPANA ER) 20 MG 12 hr tablet Take 30 mg by mouth Daily. , Until Discontinued, Historical Med      potassium chloride SA (K-DUR,KLOR-CON) 20 MEQ tablet Take 20 mEq by mouth once daily. , Starting 12/18/2013, Until Discontinued, Historical Med      PSYLLIUM HUSK (METAMUCIL ORAL) Take by mouth Daily., Until Discontinued, Historical Med           No discharge procedures on file.  Follow-up Information     Sharda Sandhu PA-C. Go on 6/13/2017.    Specialty:  Orthopedic Surgery  Why:  Follow-Up Appointment  Contact information:  [address] [facility] [address]  [phone]

## 2017-06-02 NOTE — PT/OT/SLP EVAL
PhysicalTherapy   Evaluation    Isabel Dennis   MRN: [id number]     PT Received On: 06/02/17  PT Start Time: 1028     PT Stop Time: 1045    PT Start Time: 1315  PT Stop Time: 1320  PT Start Time: 1405  PT Stop Time: 1415  PT Total Time: 32 minutes     Billable Minutes:  Evaluation 15 and Therapeutic Activity 17    Diagnosis: Primary osteoarthritis of right knee  Past Medical History:   Diagnosis Date    Allergy     Anxiety     Arthritis     DDD (degenerative disc disease), cervical     Depression     Diabetes mellitus     Diabetes mellitus, type 2     Diverticulosis     GERD (gastroesophageal reflux disease)     Hx of colonic polyps     Lung disease     Other and unspecified hyperlipidemia     Post-traumatic osteoarthritis of both knees       Past Surgical History:   Procedure Laterality Date    ADENOIDECTOMY      COLONOSCOPY      epiglottis      4 times    HYSTERECTOMY      Partial, fibroids    KNEE SURGERY      NECK SURGERY      SPINE SURGERY      TONSILLECTOMY           General Precautions: Standard, fall  Orthopedic Precautions: RLE weight bearing as tolerated   Braces: N/A    Do you have any cultural, spiritual, Orthodoxy conflicts, given your current situation?: none    Patient History:  Lives With: spouse, child(gay), adult, grandchild(gay)  Living Arrangements: house  Home Accessibility: stairs to enter home  Home Layout: Able to live on 1st floor  Number of Stairs to Enter Home: 6  Stair Railings at Home: outside, present at both sides  Transportation Available: family or friend will provide  Living Environment Comment: pt lives with family but states that she will not have much help upon return home  Equipment Currently Used at Home: cane, straight  DME owned (not currently used): rolling walker    Previous Level of Function:  Ambulation Skills: needs device  Transfer Skills: needs device  ADL Skills: independent  Work/Leisure Activity: independent    Subjective:  Communicated with pt  and OT prior to session. Pt stated that she was in a lot of pain and was ready to go home.     Chief Complaint: significant pain  Patient goals: to be able to transfer and walk to go home    Pain/Comfort  Location - Side 1: Right  Location 1: knee  Pain Addressed 1: Pre-medicate for activity, Reposition, Distraction, Cessation of Activity    Objective:  Patient found sidelying in bed with Patient found with: Polar ice    Cognitive Exam:  Oriented to: Person, Place, Time and Situation  Follows Commands/attention: Follows two-step commands  Communication: clear/fluent  Safety awareness/insight to disability: impaired    Physical Exam:  Postural examination/scapula alignment: Rounded shoulder and Head forward    Skin integrity: Visible skin intact  Edema: Moderate RLE    Sensation:   Intact    Upper Extremity Range of Motion:  Right Upper Extremity: WNL  Left Upper Extremity: WNL    Upper Extremity Strength:  Right Upper Extremity: WFL  Left Upper Extremity: WFL    Lower Extremity Range of Motion:  Right Lower Extremity: not tested due to pt not willing to participate  Left Lower Extremity: WFL    Lower Extremity Strength:  Right Lower Extremity: not tested due to pt not willing to participate  Left Lower Extremity: not tested due to pt not willing to participate     Fine motor coordination:  Intact    Gross motor coordination: WFL    Functional Status:  MDS G  Bed Mobility Functional Status: mod(I) - (I)  Transfer Functional Status: CGA-Min (A)  Moving from seated to standing position: Not steady, only able to stabilize with staff assistance  Turning around and facing the opposite direction while walking: Not steady, only able to stabilize with staff assistance  Moving on and off the toilet: Not steady, only able to stabilize with staff assistance  Surface-to-surface transfer (transfer between bed and chair or wheelchair): Not steady, only able to stabilize with staff assistance    MDS GG  Toileting Hygiene  Performance: Setup or clean-up assistance  Sit to lying Performance: Independent  Lying to sitting on side of bed Performance: Independent.  Sit to Stand Performance: Supervision or touching assistance.  Chair/bed-to-chair transfer Performance: Supervision or touching assistance.  Does the resident walk?: No, and walking goal is clinically indicated --> Code the resident's discharge goal for Walk 50 feet and and Walk 150 feet.  Walk 50 feet with two turns Performance: See goal below  Walk 50 feet with two turns Goal: Supervision or touching assistance.    Bed Mobility:  Sit>Supine: mod I   Supine>Sit: mod I    Transfers:  Stand Pivot Transfer: CGA with RW BSC <> bed with instruction for upright posture and safety with using walker    Balance:  Pt requires CGA for dynamic standing tasks with unilateral UE support    Patient left supine with call button in reach.    Assessment:  Isabel Dennis is a 62 y.o. female with a medical diagnosis of Primary osteoarthritis of right knee sp R TKA. Pt was not agreeable to therapy stating that her pain was too bad but agreed to have assistance for stand pivot transfers to/from BS for 3 trials throughout the day. Pt has contributing diagnoses including DDD, depression and anxiety, Oa, and previous neck and spine surgery that impact POC. The patient demonstrates evolving characteristics due to increased pain and anxiety and depression classifying this evaluation at moderate complexity. Pt will benefit from PT treatment to address impairments and improve functional mobility in order to return home.     Rehab identified problem list/impairments: weakness, impaired endurance, impaired functional mobilty, gait instability, impaired balance, decreased safety awareness, pain, decreased ROM, edema    Rehab potential is fair.    Activity tolerance: Fair    Discharge recommendations: home with home health     Barriers to discharge: Inaccessible home environment    Equipment  recommendations: 3-in-1 commode, bath bench     GOALS:    Physical Therapy Goals        Problem: Physical Therapy Goal    Goal Priority Disciplines Outcome Goal Variances Interventions   Physical Therapy Goal     PT/OT, PT Ongoing (interventions implemented as appropriate)     Description:  Goals to be met by: 14 days     Patient will increase functional independence with mobility by performin. Sit to stand transfer with Modified [place]  2. Bed to chair transfer with Modified [place] using Rolling Walker  3. Gait  x 50 feet with Supervision using Rolling Walker.   4. Wheelchair propulsion x150 feet with Modified [place] using bilateral uppper extremities  5. Ascend/descend 6 stair with bilateral Handrails Minimal Assistance  6. Stand for 3 minutes with Modified [place] using Rolling Walker  7. Increased functional strength to 5/5 for BLE except R knee extension.  8. Lower extremity exercise program x20 reps per handout, with independence                      PLAN:    Patient to be seen  (5-6x/week)  to address the above listed problems via gait training, therapeutic activities, therapeutic exercises, neuromuscular re-education  Plan of Care Expires: 17    Jess Brooks, PT 2017

## 2017-06-02 NOTE — PT/OT/SLP EVAL
"Occupational Therapy  Evaluation    Isabel Dennis   MRN: [id number]   Admitting Diagnosis: Primary osteoarthritis of right knee     OT Date of Treatment: 06/02/17   OT Start Time: 0935  OT Stop Time: 1005  OT Total Time (min): 30 min    Billable Minutes:  Evaluation 15  Therapeutic Activity 15    Diagnosis: Primary osteoarthritis of right knee    Past Medical History:   Diagnosis Date    Allergy     Anxiety     Arthritis     DDD (degenerative disc disease), cervical     Depression     Diabetes mellitus     Diabetes mellitus, type 2     Diverticulosis     GERD (gastroesophageal reflux disease)     Hx of colonic polyps     Lung disease     Other and unspecified hyperlipidemia     Post-traumatic osteoarthritis of both knees       Past Surgical History:   Procedure Laterality Date    ADENOIDECTOMY      COLONOSCOPY      epiglottis      4 times    HYSTERECTOMY      Partial, fibroids    KNEE SURGERY      NECK SURGERY      SPINE SURGERY      TONSILLECTOMY           General Precautions: Standard, fall  Orthopedic Precautions: RLE weight bearing as tolerated  Braces:            Patient History:  Lives With: grandchild(gay), spouse  Living Arrangements: house  Home Accessibility: stairs to enter home  Home Layout: Able to live on 1st floor  Number of Stairs to Enter Home: 6  Stair Railings at Home: outside, present at both sides  Transportation Available: family or friend will provide  Living Environment Comment: patient lives with spouse and granson who are able to assist upon DC, uses a tub for bathing reporting she sits on the edge and splashes water on herself    Prior level of function:   Bed Mobility/Transfers: independent  Grooming: independent  Bathing: independent  Upper Body Dressing: independent  Lower Body Dressing: independent  Toileting: independent  Leisure and Hobbies: bike riding     Dominant hand: right    Subjective:  Communicated with nurse, PCT and patient prior to session.  "I'm not " "doing anything until to fix this pain"    Chief Complaint: pain  Patient/Family stated goals: "Use my leg the best that it can do"  Pain/Comfort  Pain Rating 1: other (see comments) (25/10)  Location - Side 1: Right  Location - Orientation 1: generalized  Location 1: knee  Pain Addressed 1: Pre-medicate for activity, Reposition, Distraction, Cessation of Activity  Pain Rating Post-Intervention 1: other (see comments) (25/10. patient reports 3/10 is tolerable level)    Objective:    Patient in bed right sidelying with c/o severe pain.     Cognitive Exam:  Oriented to: Person, Place, Time and Situation  Follows Commands/attention: Inattentive  Communication: dysarthria  Memory:  No Deficits noted  Safety awareness/insight to disability: impaired  Coping skills/emotional control: Anger    Visual/perceptual:  Unable to assess    Physical Exam:  Postural examination/scapula alignment: Unable to assess  Skin integrity: Unable to assess  Edema: Unable to assess    Sensation:   Unable to assess    Upper Extremity Range of Motion:  Appears WFL    Upper Extremity Strength:  Unable to assess    Fine motor coordination:   Intact    Gross motor coordination: Unable to assess    Functional Status:    Unable to assess  Patient declined participation is physical assessment secondary to severe pain despite having been premedicated. Nursing aware.      Treatment:   Education provided for deep breathing and relaxation for pain management.     Patient left right sidelying with call button in reach    Assessment:  Isabel Dennis is a 62 y.o. female with a medical diagnosis of Primary osteoarthritis of right knee and history of cervical DJD, anxiety, depression, DM, diverticulitis, lung disorder, and mutiple orthopedic surgeries for spine and knees.  Patient unable to participate in physical exam at this time secondary to pain. IS easily angered at this time which may be due to pain. Would benefit from skilled OT to address deficits in " self-care and functional mobility in order to return home safely with her  and grandson.    Pt presented with a low complexity OT evaluation. Pt required a minimal review of medical history and occupational profile. Pt demod 5+ performance deficits (physical, cognitive, or psychosocial) resulting in limitations and engagement restrictions. Clinical decision making required analytical complexity with minimal treatment options. Pt with cormorbidities and unknown requirements at this time for modification of task/assistance with assessment.       Physical- skills refer to impairments of body structure or functions, balance, mobility; strength, endurance, FMC, GMC, sensation, dexterity, and posture.  Cognitive- skills refer to ability to attend, communicate, perceive, think, understand, problem solve, mentally sequence, learn, and remember resulting in ability to organize occupational performance in timely and safe manner.   Psychosocial- skills refer to interpersonal interactions, habits, routines, and behaviors, active use of coping strategies, and environmental adaptations to appropriately participate in everyday tasks and situations.      Rehab identified problem list/impairments: weakness, impaired endurance, impaired self care skills, impaired functional mobilty, gait instability, impaired balance, decreased lower extremity function, decreased safety awareness, pain, decreased ROM    Rehab potential is good with pain control    Activity tolerance: Poor    Discharge recommendations: home with home health, home health OT     Barriers to discharge:       Equipment recommendations: 3-in-1 commode, bath bench     GOALS:    Occupational Therapy Goals        Problem: Occupational Therapy Goal    Goal Priority Disciplines Outcome Interventions   Occupational Therapy Goal     OT, PT/OT Ongoing (interventions implemented as appropriate)                    PLAN: Patient to be seen 5 x/week to address the above listed  problems via self-care/home management, therapeutic activities, therapeutic exercises, neuromuscular re-education  Plan of Care expires:    Plan of Care reviewed with: patient    MATT Ayers  06/02/2017

## 2017-06-02 NOTE — HPI
Patient is a 62 y.o. female who has a past medical history of Anxiety; Arthritis; DDD; Depression; Diabetes mellitus type 2; Diverticulosis; GERD; hyperlipidemia presented with chronic right knee pain due to osteoarthritis. She has failed non surgical treatment and elected to undergo definitive surgical treatment. She underwent right total knee arthroplasty on 5/31/17. Patient tolerated procedure well with no significant post operative complications. Patient has been working with PT/OT who recommend SNF for further balance/mobility training. Patient lives at home with her  in single story home and prior to surgery was independent with ADL's. Patient complains of severe pain that is not controlled with the pain medications. She has been in pain since surgery. She has worked with therapy which has exacerbated her pain. She is tolerating diet without difficulties. Patient currently denies any fever/chills, chest pain, dyspnea, weakness, numbness, abdominal pain, nausea/vomiting, dysuria/hematuria, or weight loss.

## 2017-06-02 NOTE — PLAN OF CARE
Problem: Patient Care Overview  Goal: Plan of Care Review  Outcome: Ongoing (interventions implemented as appropriate)   06/02/17 0021   Coping/Psychosocial   Plan Of Care Reviewed With patient       Problem: Fall Risk (Adult)  Goal: Identify Related Risk Factors and Signs and Symptoms  Related risk factors and signs and symptoms are identified upon initiation of Human Response Clinical Practice Guideline (CPG)    06/02/17 0021   Fall Risk   Related Risk Factors (Fall Risk) bladder function altered;fatigue/slow reaction;fear of falling;gait/mobility problems;impaired vision;environment unfamiliar;sleep pattern alteration;polypharmacy;age-related changes   Signs and Symptoms (Fall Risk) presence of risk factors       Comments: Pt was assessed and vs taken recorded. Noted no active skin breakdown. Dressing at incision site dry and intact. Polar ice pump checked and patent. AO x 4, anxious, repeated request, not cp distress and in pain. Medications were given including pain medication and claimed it temporarily offers relief. Call bell with in reached, commode at bedside, 2 x side rails of bed elevated and bed in lowest position.Pt turned every 2 hours and monitor for pain and safety.

## 2017-06-03 PROCEDURE — 97535 SELF CARE MNGMENT TRAINING: CPT

## 2017-06-03 PROCEDURE — 97110 THERAPEUTIC EXERCISES: CPT

## 2017-06-03 PROCEDURE — 25000003 PHARM REV CODE 250: Performed by: HOSPITALIST

## 2017-06-03 PROCEDURE — 12000000 HC SNF SEMI-PRIVATE ROOM

## 2017-06-03 PROCEDURE — 25000003 PHARM REV CODE 250: Performed by: STUDENT IN AN ORGANIZED HEALTH CARE EDUCATION/TRAINING PROGRAM

## 2017-06-03 RX ADMIN — HYDROCODONE BITARTRATE AND ACETAMINOPHEN 1 TABLET: 10; 325 TABLET ORAL at 10:06

## 2017-06-03 RX ADMIN — POLYETHYLENE GLYCOL 3350 17 G: 17 POWDER, FOR SOLUTION ORAL at 08:06

## 2017-06-03 RX ADMIN — CARISOPRODOL 350 MG: 350 TABLET ORAL at 01:06

## 2017-06-03 RX ADMIN — AMITRIPTYLINE HYDROCHLORIDE 50 MG: 50 TABLET, FILM COATED ORAL at 08:06

## 2017-06-03 RX ADMIN — MORPHINE SULFATE 15 MG: 15 TABLET, EXTENDED RELEASE ORAL at 10:06

## 2017-06-03 RX ADMIN — HYDROCODONE BITARTRATE AND ACETAMINOPHEN 1 TABLET: 10; 325 TABLET ORAL at 01:06

## 2017-06-03 RX ADMIN — ASPIRIN 325 MG ORAL TABLET 325 MG: 325 PILL ORAL at 10:06

## 2017-06-03 RX ADMIN — STANDARDIZED SENNA CONCENTRATE AND DOCUSATE SODIUM 1 TABLET: 8.6; 5 TABLET, FILM COATED ORAL at 08:06

## 2017-06-03 RX ADMIN — MORPHINE SULFATE 15 MG: 15 TABLET, EXTENDED RELEASE ORAL at 08:06

## 2017-06-03 RX ADMIN — HYDROCODONE BITARTRATE AND ACETAMINOPHEN 1 TABLET: 10; 325 TABLET ORAL at 08:06

## 2017-06-03 RX ADMIN — PANTOPRAZOLE SODIUM 40 MG: 40 TABLET, DELAYED RELEASE ORAL at 08:06

## 2017-06-03 RX ADMIN — CARISOPRODOL 350 MG: 350 TABLET ORAL at 08:06

## 2017-06-03 RX ADMIN — CARISOPRODOL 350 MG: 350 TABLET ORAL at 10:06

## 2017-06-03 RX ADMIN — HYDROCODONE BITARTRATE AND ACETAMINOPHEN 1 TABLET: 10; 325 TABLET ORAL at 05:06

## 2017-06-03 RX ADMIN — HYDROCODONE BITARTRATE AND ACETAMINOPHEN 1 TABLET: 10; 325 TABLET ORAL at 03:06

## 2017-06-03 RX ADMIN — ASPIRIN 325 MG ORAL TABLET 325 MG: 325 PILL ORAL at 08:06

## 2017-06-03 RX ADMIN — STANDARDIZED SENNA CONCENTRATE AND DOCUSATE SODIUM 1 TABLET: 8.6; 5 TABLET, FILM COATED ORAL at 10:06

## 2017-06-03 RX ADMIN — AMITRIPTYLINE HYDROCHLORIDE 50 MG: 50 TABLET, FILM COATED ORAL at 10:06

## 2017-06-03 NOTE — PT/OT/SLP PROGRESS
Occupational Therapy  Treatment    Isabel Dennis   MRN: [id number]   Admitting Diagnosis: Primary osteoarthritis of right knee    OT Date of Treatment: 06/03/17  Total Time (min): 60 min    Billable Minutes:  Self Care/Home Management 45 and Therapeutic Exercise 15    General Precautions: Standard, fall (impulsive)  Orthopedic Precautions: RLE weight bearing as tolerated  Braces: N/A         Subjective:  Communicated with nurse prior to session.  I get myself on and off the toilet (OT instructed pt. To call for assist with OOB activity)    Pain/Comfort  Pain Rating 1: other (see comments)  Location - Side 1: Right  Location 1: knee  Pain Addressed 1: Pre-medicate for activity, Reposition    Objective:  Patient found with: Polar ice (supine in bed)    Functional Status:  MDS G  Bed Mobility Functional Status: mod(I) - (I)  Transfer Functional Status:SBA with RW  Walk in Room Functional Status: CGA with RW  Dressing Functional Status: 2:CGA-Min (A)  LBD/UBD Set Up A  Eating Functional Status: S-et Up A  Toilet Use Functional Status: SBA with BSC  Personal Hygiene Functional Status: Set Up A seated  Bathing Functional Status: CGA for safety sponge bath (impulsiveness noted )  Moving from seated to standing position: Not steady, only able to stabilize with staff assistance  Surface-to-surface transfer (transfer between bed and chair or wheelchair): Not steady, only able to stabilize with staff assistance    MDS GG  Eating Performance: Set-up or clean-up assistance.  Oral Hygiene Performance: Setup or clean-up assistance  Toileting Hygiene Performance: Supervision or touching assistance.  Sit to lying Performance: Independent  Lying to sitting on side of bed Performance: Independent.  Sit to Stand Performance: Supervision or touching assistance.  Chair/bed-to-chair transfer Performance: Supervision or touching assistance.     OT Exercises: AROM with 3 # dowel x 2 sets 10 reps for all major planes of BUE  motion    Additional Treatment:  Educated on safety with functional mobility and need for assist for mobility/OOB (pt. Attempting to walk on wet floor even though instructed by OT not to stating her socks had grippers on them)  Pt. Ambulated ~ 75 feet with RW and CGA    Patient left seated on BSC with call button in reach and PCT notified with call button in reach    ASSESSMENT:  Isabel Dennis is a 62 y.o. female with a medical diagnosis of Primary osteoarthritis of right knee and presents with deficits in higher level ADL tasks as well as functional mobility and would benefit from continued OT services for SAFETY training and to improve independence with ADL tasks.     Rehab identified problem list/impairments: impaired endurance, impaired self care skills, impaired functional mobilty, decreased lower extremity function, decreased safety awareness, pain, orthopedic precautions    Rehab potential is good    Activity tolerance: Good    Discharge recommendations: home with home health     Barriers to discharge: Inaccessible home environment, Decreased caregiver support     Equipment recommendations: 3-in-1 commode, bath bench     GOALS:    Occupational Therapy Goals        Problem: Occupational Therapy Goal    Goal Priority Disciplines Outcome Interventions   Occupational Therapy Goal     OT, PT/OT Ongoing (interventions implemented as appropriate)                    Plan:  Patient to be seen 5 x/week to address the above listed problems via self-care/home management, therapeutic activities, therapeutic exercises  Plan of Care expires:    Plan of Care reviewed with: patient    MATT Sanchez  06/03/2017

## 2017-06-03 NOTE — PLAN OF CARE
Problem: Occupational Therapy Goal  Goal: Occupational Therapy Goal  Pt. Tolerated session well

## 2017-06-03 NOTE — PROGRESS NOTES
Pt instructed to call for assistance when transferring to bedside commode, pt has been noncompliant. Bed alarms on. Bed locked and lowered call light within reach, will continue to monitor.

## 2017-06-04 PROCEDURE — 12000000 HC SNF SEMI-PRIVATE ROOM

## 2017-06-04 PROCEDURE — 25000003 PHARM REV CODE 250: Performed by: HOSPITALIST

## 2017-06-04 PROCEDURE — 25000003 PHARM REV CODE 250: Performed by: STUDENT IN AN ORGANIZED HEALTH CARE EDUCATION/TRAINING PROGRAM

## 2017-06-04 RX ORDER — MORPHINE SULFATE 30 MG/1
30 TABLET, FILM COATED, EXTENDED RELEASE ORAL EVERY 12 HOURS
Status: DISCONTINUED | OUTPATIENT
Start: 2017-06-04 | End: 2017-06-07 | Stop reason: HOSPADM

## 2017-06-04 RX ADMIN — CARISOPRODOL 350 MG: 350 TABLET ORAL at 09:06

## 2017-06-04 RX ADMIN — HYDROCODONE BITARTRATE AND ACETAMINOPHEN 1 TABLET: 10; 325 TABLET ORAL at 08:06

## 2017-06-04 RX ADMIN — MORPHINE SULFATE 15 MG: 15 TABLET, EXTENDED RELEASE ORAL at 08:06

## 2017-06-04 RX ADMIN — AMITRIPTYLINE HYDROCHLORIDE 50 MG: 50 TABLET, FILM COATED ORAL at 09:06

## 2017-06-04 RX ADMIN — ASPIRIN 325 MG ORAL TABLET 325 MG: 325 PILL ORAL at 08:06

## 2017-06-04 RX ADMIN — HYDROCODONE BITARTRATE AND ACETAMINOPHEN 1 TABLET: 10; 325 TABLET ORAL at 03:06

## 2017-06-04 RX ADMIN — POLYETHYLENE GLYCOL 3350 17 G: 17 POWDER, FOR SOLUTION ORAL at 08:06

## 2017-06-04 RX ADMIN — HYDROCODONE BITARTRATE AND ACETAMINOPHEN 1 TABLET: 10; 325 TABLET ORAL at 07:06

## 2017-06-04 RX ADMIN — HYDROCODONE BITARTRATE AND ACETAMINOPHEN 1 TABLET: 10; 325 TABLET ORAL at 01:06

## 2017-06-04 RX ADMIN — CARISOPRODOL 350 MG: 350 TABLET ORAL at 08:06

## 2017-06-04 RX ADMIN — MORPHINE SULFATE 30 MG: 30 TABLET, EXTENDED RELEASE ORAL at 09:06

## 2017-06-04 RX ADMIN — PANTOPRAZOLE SODIUM 40 MG: 40 TABLET, DELAYED RELEASE ORAL at 08:06

## 2017-06-04 RX ADMIN — HYDROCODONE BITARTRATE AND ACETAMINOPHEN 1 TABLET: 10; 325 TABLET ORAL at 11:06

## 2017-06-04 RX ADMIN — AMITRIPTYLINE HYDROCHLORIDE 50 MG: 50 TABLET, FILM COATED ORAL at 08:06

## 2017-06-04 RX ADMIN — STANDARDIZED SENNA CONCENTRATE AND DOCUSATE SODIUM 1 TABLET: 8.6; 5 TABLET, FILM COATED ORAL at 09:06

## 2017-06-04 RX ADMIN — ASPIRIN 325 MG ORAL TABLET 325 MG: 325 PILL ORAL at 09:06

## 2017-06-04 RX ADMIN — STANDARDIZED SENNA CONCENTRATE AND DOCUSATE SODIUM 1 TABLET: 8.6; 5 TABLET, FILM COATED ORAL at 08:06

## 2017-06-04 RX ADMIN — CARISOPRODOL 350 MG: 350 TABLET ORAL at 01:06

## 2017-06-04 NOTE — PROGRESS NOTES
"Pt is extremely combative and argumentative with members of staff.  Charge nurse and md fares aware. Pt states "she wants to go home." pt instructed repeately not to ambulate without assisance. Pt has been non compliant. Bed locked and lowered. Call light within reach. Will continue to monitor.  "

## 2017-06-04 NOTE — PT/OT/SLP PROGRESS
Physical Therapy      Isabel Dennis  MRN: [id number]    Patient not seen today secondary to refusal. Pt reported that she was in too much pain to participate in therapy. Pt educated on the importance of PT and being OOB. Pt verb understanding but continued to refuse  . Will follow-up per PT POC.    Sayda Rod, PT   6/4/2017

## 2017-06-04 NOTE — PROGRESS NOTES
"Pt very rude states "pt in bed next to me must be getting better pain medicine than me she was sleeping all day I'm hurting cause iI dont get the right medicine because I'm black.""I'm leaving here Monday so I can go home and get my medicine cause you people wont give me what I want"  "

## 2017-06-04 NOTE — NURSING
"Informed by assigned nurse ( anabel) that pt was being combative, rude and "cursing me out." Charge nurse entered rm and observed pt sitting on bedside. Pt is yelling, cursing and make racial comment about staff. Charge nurse informed pt abt the importance of respecting staff and other pt/family members on unit. Pt  States " I don't care"  Dr. Grier called during conversation with pt. No new orders @ present.   "

## 2017-06-04 NOTE — PLAN OF CARE
Pt A & O pt repositioned with pillow every q2 hrs,  No skin breakdown noted .  pulses palable +movement/sensation, cap refill WNL in all 4 extremities , monitored for pain and safety. Safety maintained. , pt remained afebrile 98.2 Bed locked and lowered, call light within reach. Will continue to monitor

## 2017-06-05 LAB
ANION GAP SERPL CALC-SCNC: 9 MMOL/L
BASOPHILS # BLD AUTO: 0.03 K/UL
BASOPHILS NFR BLD: 0.6 %
BUN SERPL-MCNC: 12 MG/DL
CALCIUM SERPL-MCNC: 9.3 MG/DL
CHLORIDE SERPL-SCNC: 103 MMOL/L
CO2 SERPL-SCNC: 27 MMOL/L
CREAT SERPL-MCNC: 0.7 MG/DL
DIFFERENTIAL METHOD: ABNORMAL
EOSINOPHIL # BLD AUTO: 0.1 K/UL
EOSINOPHIL NFR BLD: 2.5 %
ERYTHROCYTE [DISTWIDTH] IN BLOOD BY AUTOMATED COUNT: 14.7 %
EST. GFR  (AFRICAN AMERICAN): >60 ML/MIN/1.73 M^2
EST. GFR  (NON AFRICAN AMERICAN): >60 ML/MIN/1.73 M^2
GLUCOSE SERPL-MCNC: 139 MG/DL
HCT VFR BLD AUTO: 27.8 %
HGB BLD-MCNC: 8.2 G/DL
LYMPHOCYTES # BLD AUTO: 2.4 K/UL
LYMPHOCYTES NFR BLD: 45.3 %
MAGNESIUM SERPL-MCNC: 1.9 MG/DL
MCH RBC QN AUTO: 26.2 PG
MCHC RBC AUTO-ENTMCNC: 29.5 %
MCV RBC AUTO: 89 FL
MONOCYTES # BLD AUTO: 0.6 K/UL
MONOCYTES NFR BLD: 10.4 %
NEUTROPHILS # BLD AUTO: 2.2 K/UL
NEUTROPHILS NFR BLD: 41.2 %
PHOSPHATE SERPL-MCNC: 4.3 MG/DL
PLATELET # BLD AUTO: 327 K/UL
PMV BLD AUTO: 9.9 FL
POTASSIUM SERPL-SCNC: 4 MMOL/L
RBC # BLD AUTO: 3.13 M/UL
SODIUM SERPL-SCNC: 139 MMOL/L
WBC # BLD AUTO: 5.3 K/UL

## 2017-06-05 PROCEDURE — 97116 GAIT TRAINING THERAPY: CPT

## 2017-06-05 PROCEDURE — 99307 SBSQ NF CARE SF MDM 10: CPT | Mod: ,,, | Performed by: NURSE PRACTITIONER

## 2017-06-05 PROCEDURE — 85025 COMPLETE CBC W/AUTO DIFF WBC: CPT

## 2017-06-05 PROCEDURE — 84100 ASSAY OF PHOSPHORUS: CPT

## 2017-06-05 PROCEDURE — 80048 BASIC METABOLIC PNL TOTAL CA: CPT

## 2017-06-05 PROCEDURE — 97535 SELF CARE MNGMENT TRAINING: CPT

## 2017-06-05 PROCEDURE — 97530 THERAPEUTIC ACTIVITIES: CPT

## 2017-06-05 PROCEDURE — 25000003 PHARM REV CODE 250: Performed by: STUDENT IN AN ORGANIZED HEALTH CARE EDUCATION/TRAINING PROGRAM

## 2017-06-05 PROCEDURE — 83735 ASSAY OF MAGNESIUM: CPT

## 2017-06-05 PROCEDURE — 12000000 HC SNF SEMI-PRIVATE ROOM

## 2017-06-05 PROCEDURE — 36415 COLL VENOUS BLD VENIPUNCTURE: CPT

## 2017-06-05 PROCEDURE — 25000003 PHARM REV CODE 250: Performed by: HOSPITALIST

## 2017-06-05 RX ORDER — COD LIVER OIL
OIL (ML) ORAL
Refills: 0 | COMMUNITY
Start: 2017-06-05 | End: 2020-02-05

## 2017-06-05 RX ORDER — AMOXICILLIN 500 MG
CAPSULE ORAL
COMMUNITY
Start: 2017-06-05 | End: 2020-02-05

## 2017-06-05 RX ORDER — NAPROXEN 500 MG/1
TABLET ORAL
Start: 2017-06-05 | End: 2018-04-03

## 2017-06-05 RX ORDER — FLAXSEED OIL 1000 MG
CAPSULE ORAL
Refills: 0 | COMMUNITY
Start: 2017-06-05 | End: 2020-02-05

## 2017-06-05 RX ORDER — HYDROCODONE BITARTRATE AND ACETAMINOPHEN 10; 325 MG/1; MG/1
1 TABLET ORAL EVERY 4 HOURS PRN
Qty: 41 TABLET | Refills: 0 | Status: SHIPPED | OUTPATIENT
Start: 2017-06-05 | End: 2017-06-13 | Stop reason: SDUPTHER

## 2017-06-05 RX ADMIN — CARISOPRODOL 350 MG: 350 TABLET ORAL at 02:06

## 2017-06-05 RX ADMIN — HYDROCODONE BITARTRATE AND ACETAMINOPHEN 1 TABLET: 10; 325 TABLET ORAL at 02:06

## 2017-06-05 RX ADMIN — HYDROCODONE BITARTRATE AND ACETAMINOPHEN 1 TABLET: 10; 325 TABLET ORAL at 10:06

## 2017-06-05 RX ADMIN — MORPHINE SULFATE 30 MG: 30 TABLET, EXTENDED RELEASE ORAL at 09:06

## 2017-06-05 RX ADMIN — AMITRIPTYLINE HYDROCHLORIDE 50 MG: 50 TABLET, FILM COATED ORAL at 09:06

## 2017-06-05 RX ADMIN — CARISOPRODOL 350 MG: 350 TABLET ORAL at 09:06

## 2017-06-05 RX ADMIN — ASPIRIN 325 MG ORAL TABLET 325 MG: 325 PILL ORAL at 09:06

## 2017-06-05 RX ADMIN — HYDROCODONE BITARTRATE AND ACETAMINOPHEN 1 TABLET: 10; 325 TABLET ORAL at 06:06

## 2017-06-05 RX ADMIN — HYDROCODONE BITARTRATE AND ACETAMINOPHEN 1 TABLET: 10; 325 TABLET ORAL at 03:06

## 2017-06-05 RX ADMIN — ALUMINUM HYDROXIDE, MAGNESIUM HYDROXIDE, AND SIMETHICONE 15 ML: 200; 200; 20 SUSPENSION ORAL at 09:06

## 2017-06-05 RX ADMIN — STANDARDIZED SENNA CONCENTRATE AND DOCUSATE SODIUM 1 TABLET: 8.6; 5 TABLET, FILM COATED ORAL at 09:06

## 2017-06-05 RX ADMIN — CARISOPRODOL 350 MG: 350 TABLET ORAL at 06:06

## 2017-06-05 RX ADMIN — POLYETHYLENE GLYCOL 3350 17 G: 17 POWDER, FOR SOLUTION ORAL at 09:06

## 2017-06-05 NOTE — PLAN OF CARE
06/05/2017   4:25 PM    SW faxed home health referral (HH orders, H&P, and facesheet) to [facility] (fx [phone]) as pt denies having preference for home health placement.  Pt originally stating that she was d/cing from SNF this afternoon.  However, pt notified SW later this afternoon that she does not have transportation available until tomorrow.  SW notified NP Avelina and SNF nurse Chen of same.  Pt refusing order for TTB at this time.  Per pt's request, pt to have WC, BSC, and RW delivered to SNF prior to d/c.  SW remains available for further d/c planning assistance and will f/u as needed.    Bandar Krishnan, RD  [id number]

## 2017-06-05 NOTE — PT/OT/SLP PROGRESS
Physical Therapy  Treatment    Isabel Dennis   MRN: [id number]   Admitting Diagnosis: Primary osteoarthritis of right knee    PT Received On: 06/05/17  Total Time: 50 minutes       Billable Minutes:  Therapeutic Activity 35 Gait Training 15    Treatment Type: Treatment  PT/PTA: PT             General Precautions: Standard, fall  Orthopedic Precautions: RLE weight bearing as tolerated   Braces: N/A    Do you have any cultural, spiritual, Orthodoxy conflicts, given your current situation?: none    Subjective:  Communicated with pt prior to session. Pt reported multiple complaints today including significant pain in L knee, not having help, lying in her urine that spilled from the bedpan for several hours last night, and not being given pain medication all of which she stated that she plans to discuss with the . Pt stated multiple times that she was leaving today but was agreeable to perform stair negotiation prior to discharge. Pt refused any other PT treatment.       Pain/Comfort  Location - Side 1: Right  Location 1: knee  Pain Addressed 1: Pre-medicate for activity, Reposition, Distraction    Objective:  Patient found in bathroom with door locked by herself with Rw on first attempt and sitting on bed for second attempt.     Functional Status:  MDS G  Transfer Functional Status: mod(I) - (I)  Walk in Room Functional Status: S-SBA  Moving from seated to standing position: Not steady, but able to stabilize without staff assistance  Walking (with assistive device if used): Not steady, but able to stabilize without staff assistance  Turning around and facing the opposite direction while walking: Not steady, but able to stabilize without staff assistance  Moving on and off the toilet: Not steady, but able to stabilize without staff assistance  Surface-to-surface transfer (transfer between bed and chair or wheelchair): Not steady, but able to stabilize without staff assistance    RANDALL GG  Does the resident  walk?: Yes --> Continue to Walk 50 feet with two turns assessment  Walk 50 feet with two turns Performance: Patient refused     Bed Mobility:  Supine>Sit: mod I    Transfers:  Sit<>Stand: mod I from bed, toilet, and wheelchair  Stand Pivot Transfer: mod I using RW    Gait:  Amb 10 feet in room with RW and supervision with wide ALE, decreased step length, and antalgic gait     Advanced Gait:  Stairs: 1x4 using bilateral rail with SBA, 1x4 using unilateral rail including descending backward with SBA     Patient left standing at bedside with pt request to leave her where she was and insisting that she was fine with call button in reach.    Assessment:  Isabel Dennis is a 62 y.o. female with a medical diagnosis of Primary osteoarthritis of right knee sp R TKA. Pt did not participate nor tolerate PT treatment well due to complaints of significant pain in R knee and becoming easily agitated with aggressive attitude when asked to participate in therapy. Pt refused most treatment and only agreed to perform stair training in order to be discharged to home. The patient would have benefited from PT treatment to address impairments and improve functional mobility but has requested to return home with plans to receive home health services.      Rehab identified problem list/impairments: weakness, impaired endurance, impaired functional mobilty, gait instability, impaired balance, decreased safety awareness, pain, decreased ROM, edema    Rehab potential is poor.    Activity tolerance: Poor    Discharge recommendations: home with home health     Barriers to discharge: Inaccessible home environment    Equipment recommendations: 3-in-1 commode, bath bench     GOALS:    Physical Therapy Goals        Problem: Physical Therapy Goal    Goal Priority Disciplines Outcome Goal Variances Interventions   Physical Therapy Goal     PT/OT, PT Unable to achieve outcome(s) by discharge     Description:  Goals to be met by: 14 days     Patient  will increase functional independence with mobility by performin. Sit to stand transfer with Modified [place] Met 17  2. Bed to chair transfer with Modified [place] using Rolling Walker Met 17  3. Gait  x 50 feet with Supervision using Rolling Walker.   4. Wheelchair propulsion x150 feet with Modified [place] using bilateral uppper extremities  5. Ascend/descend 6 stair with bilateral Handrails Minimal Assistance Met 17  6. Stand for 3 minutes with Modified [place] using Rolling Walker  7. Increased functional strength to 5/5 for BLE except R knee extension.  8. Lower extremity exercise program x20 reps per handout, with independence  9. AROM of R knee flexion 90 degrees and extension 0 degrees                        PLAN:    Patient to be seen  (5-6x/week)  to address the above listed problems via gait training, therapeutic activities, therapeutic exercises, neuromuscular re-education  Plan of Care expires: 17  Plan of Care reviewed with: patient    Jess JUVENTINO Brooks, PT  2017

## 2017-06-05 NOTE — TREATMENT PLAN
Rehab Services' DME recommendations    Isabel Dennis  MRN: [id number]        [x] Walker Wide/Heavy duty    Accessories N/A    Wheels Yes      [x] Wheelchair  Number of hours up in a wheelchair per day 8    Style Standard    Seat Width 24    Seat Depth 20    Back Height Standard    Leg Support Elevated leg rest and Swing Away    Arm Height Desk and Swing Away    Lap Belt Velcro    Cushion Basic    Justification for Cushion prevent skin breakdown    Justification for wheelchair order: (Please select all that apply) Patient's upper body strength is sufficient for propulsion, The patient has a cast, brace, or musculoskeletal condition which prevents 90 degree flexion of the knee, The patient has significant edema of the lower extremities that requires elevating leg rest, The patient requires the use of a wheelchair for ADLs within the home and Patient mobility limitations cannot be sufficiently resolved by the use of other ambulatory therapies      [x] 3 in 1 commode Wide/Heavy Duty    [x] Tub bench Heavy duty    [x] Home health PT, OT and Nurse      MATT Davis 6/5/2017

## 2017-06-05 NOTE — PLAN OF CARE
Problem: Physical Therapy Goal  Goal: Physical Therapy Goal  Goals to be met by: 14 days     Patient will increase functional independence with mobility by performin. Sit to stand transfer with Modified [place] Met 17  2. Bed to chair transfer with Modified [place] using Rolling Walker Met 17  3. Gait  x 50 feet with Supervision using Rolling Walker.   4. Wheelchair propulsion x150 feet with Modified [place] using bilateral uppper extremities  5. Ascend/descend 6 stair with bilateral Handrails Minimal Assistance Met 17  6. Stand for 3 minutes with Modified [place] using Rolling Walker  7. Increased functional strength to 5/ for BLE except R knee extension.  8. Lower extremity exercise program x20 reps per handout, with independence  9. AROM of R knee flexion 90 degrees and extension 0 degrees      Outcome: Unable to achieve outcome(s) by discharge  3 goals met, pt discharged unexpectedly due to pt request

## 2017-06-05 NOTE — PLAN OF CARE
[facility] HOME HEALTH ORDERS  FACE TO FACE ENCOUNTER    Patient Name: Isabel Dennis  YOB: 1955    PCP: Sadia De La Rosa MD   PCP Address: [address] / [place] [address]  PCP Phone Number: [phone]  PCP Fax: [phone]    Encounter Date: 06/05/2017    Admit to Home Health    Diagnoses:  Active Hospital Problems    Diagnosis  POA    *Primary osteoarthritis of right knee [M17.11]  Yes    Status post total right knee replacement 5/31/2017 [Z96.651]  Not Applicable    Edema [R60.9]  Yes    Patient is [place] [Z78.9]  Yes    Morbid obesity [E66.01]  Yes    Allergic rhinitis, seasonal [J30.2]  Yes    GERD (gastroesophageal reflux disease) [K21.9]  Yes    Diabetes mellitus, type 2 [E11.9]  Yes      Resolved Hospital Problems    Diagnosis Date Resolved POA   No resolved problems to display.       Future Appointments  Date Time Provider Department Center   6/13/2017 1:00 PM Sharda Sandhu PA-C [facility]   6/13/2017 1:30 PM [facility]   6/13/2017 2:15 PM [facility]   6/27/2017 9:30 AM Sadia De La Rosa MD [facility]     Follow-up Information     Sadia De La Rosa MD. Go on 6/27/2017.    Specialty:  Internal Medicine  Why:  Hospital Follow-up  Contact information:  [address]  [facility] [address]  [phone]             Sharda Sandhu PA-C. Go on 6/13/2017.    Specialty:  Orthopedic Surgery  Why:  Orthopedic follow-up  Contact information:  [address]  [facility] [address]  [phone]               I have seen and examined this patient face to face today. My clinical findings that support the need for the home health skilled services and home bound status are the following:  Weakness/numbness causing balance and gait disturbance due to Joint Replacement and Surgery making it taxing to leave home.    Allergies:  Review of patient's allergies indicates:    Allergen Reactions    Iodinated contrast media - oral and iv dye Hives and Rash     As per history has problem if has IV extravasation       Diet: cardiac diet and diabetic diet: 1800 calorie    Activities: activity as tolerated and no lifting, Driving, or Strenuous exercise     Nursing:   SN to complete comprehensive assessment including routine vital signs. Instruct on disease process and s/s of complications to report to MD. Review/verify medication list sent home with the patient at time of discharge  and instruct patient/caregiver as needed. Frequency may be adjusted depending on start of care date.    Notify MD if SBP > 160 or < 90; DBP > 90 or < 50; HR > 120 or < 50; Temp > 101      CONSULTS:    Physical Therapy to evaluate and treat. Evaluate for home safety and equipment needs; Establish/upgrade home exercise program. Perform / instruct on therapeutic exercises, gait training, transfer training, and Range of Motion.  Occupational Therapy to evaluate and treat. Evaluate home environment for safety and equipment needs. Perform/Instruct on transfers, ADL training, ROM, and therapeutic exercises.   to evaluate for community resources/long-range planning.  Aide to provide assistance with personal care, ADLs, and vital signs.    MISCELLANEOUS CARE:  Diabetic Care:   SN to perform and educate Diabetic management with blood glucose monitoring:, Fingerstick blood sugar a.m. and p.m. and Report CBG < 60 or > 350 to physician.    Home Health nurse to collect CBC, BMP, Mag, and Phos on 6/8/17 and send results to PCP.    WOUND CARE ORDERS  n/a      Medications: Review discharge medications with patient and family and provide education.      Current Discharge Medication List      CONTINUE these medications which have CHANGED    Details   cod liver oil Oil Do not take until PCP resumes  Refills: 0      fish oil-omega-3 fatty acids 300-1,000 mg capsule Do not take until resumed by PCP      flaxseed oil  1,000 mg Cap Pt states takes 4 daily, total of 4000 mg daily   DO not take until restarted by PCP  Refills: 0      hydrocodone-acetaminophen 10-325mg (NORCO)  mg Tab Take 1 tablet by mouth every 4 (four) hours as needed for Pain.  Qty: 41 tablet, Refills: 0      naproxen (NAPROSYN) 500 MG tablet Do not take until resume by PCP         CONTINUE these medications which have NOT CHANGED    Details   AMITIZA 24 mcg Cap Take by mouth as needed.       amitriptyline (ELAVIL) 50 MG tablet Take 50 mg by mouth 2 (two) times daily as needed.       carisoprodol (SOMA) 350 MG tablet Take 350 mg by mouth 3 (three) times daily.        esomeprazole (NEXIUM) 40 MG capsule Take 40 mg by mouth once daily.      FREESTYLE LITE STRIPS Strp       oxyMORphone (OPANA ER) 30 MG 12 hr tablet Take 30 mg by mouth every 12 (twelve) hours.      aspirin 325 MG tablet Take 1 tablet (325 mg total) by mouth 2 (two) times daily.  Qty: 60 tablet, Refills: 0    Associated Diagnoses: Status post total right knee replacement      b complex vitamins tablet Take 1 tablet by mouth once daily.      docusate sodium (COLACE) 100 MG capsule Take 1 capsule (100 mg total) by mouth 2 (two) times daily as needed for Constipation.  Qty: 60 capsule, Refills: 0    Associated Diagnoses: Status post total right knee replacement      ergocalciferol (VITAMIN D2) 50,000 unit Cap Take 1 capsule (50,000 Units total) by mouth twice a week.  Qty: 24 capsule, Refills: 0    Associated Diagnoses: Vitamin D deficiency      fluticasone (FLONASE) 50 mcg/actuation nasal spray 2 sprays by Each Nare route once daily.  Qty: 1 Bottle, Refills: 6      potassium chloride SA (K-DUR,KLOR-CON) 20 MEQ tablet Take 20 mEq by mouth once daily.       PSYLLIUM HUSK (METAMUCIL ORAL) Take by mouth Daily.         STOP taking these medications       ibuprofen (ADVIL,MOTRIN) 800 MG tablet Comments:   Reason for Stopping:         CINNAMON BARK (CINNAMON ORAL) Comments:   Reason for Stopping:          oxycodone-acetaminophen (PERCOCET)  mg per tablet Comments:   Reason for Stopping:         promethazine (PHENERGAN) 25 MG tablet Comments:   Reason for Stopping:               I certify that this patient is confined to her home and needs intermittent skilled nursing care, physical therapy and occupational therapy.

## 2017-06-05 NOTE — PT/OT/SLP PROGRESS
Occupational Therapy  Treatment    Isabel Dennis   MRN: [id number]   Admitting Diagnosis: Primary osteoarthritis of right knee    OT Date of Treatment: 06/05/17  Total Time (min): 15 min    Billable Minutes:  Self Care/Home Management 15    General Precautions: Standard, fall (impulsive)  Orthopedic Precautions: RLE weight bearing as tolerated  Braces: N/A         Subjective:  Communicated with pt prior to session.  I will do what I have to do    Pain/Comfort  Pain Rating 1:  (no number given just that it is terrible)  Pain Addressed 1: Pre-medicate for activity    Objective:  Patient found with: Polar ice (supine in bed)    Functional Status:  MDS G  Bed Mobility Functional Status: mod(I)   Transfer Functional Status: mod(I) with RW--from bed ; toilet 2x; to w/c  Walk in Room Functional Status: mod(I) with RW  Toilet Use Functional Status: mod(I) with RW and BSC over toilet  Personal Hygiene Functional Status: mod(I) -standing at sink with RW    MDS GG  Eating Performance: Independent.  Oral Hygiene Performance: Independent.  Toileting Hygiene Performance: Independent.  Sit to lying Performance: Independent  Lying to sitting on side of bed Performance: Independent.  Sit to Stand Performance: Independent.  Chair/bed-to-chair transfer Performance: Independent.  Toilet transfer Performance: Independent.     Additional Treatment:  Pt refused there ex    Patient left up in chair with PT    ASSESSMENT:  Pt demonstrated ability to perform t/f's, functional mobility in the room and bathroom and trasnfers with RW. Pt is non compliant with exercises and is adamant re: going home.  Pt would benefit from cont therapy to increase functional indep, safety and AROM in the knee;however, pt refusing to participate fully with inpt therapy and is able to go home with home health    Rehab identified problem list/impairments: impaired endurance, impaired self care skills, impaired functional mobilty, decreased lower extremity function,  decreased safety awareness, pain, orthopedic precautions    Rehab potential is good    Activity tolerance: Fair    Discharge recommendations: home with home health     Barriers to discharge: Inaccessible home environment, Decreased caregiver support     Equipment recommendations: 3-in-1 commode, bath bench     GOALS:    Occupational Therapy Goals        Problem: Occupational Therapy Goal    Goal Priority Disciplines Outcome Interventions   Occupational Therapy Goal     OT, PT/OT Ongoing (interventions implemented as appropriate)                    Plan:  Patient to be seen 5 x/week to address the above listed problems via self-care/home management, therapeutic activities, therapeutic exercises  Plan of Care reviewed with: patient    MATT Davis  06/05/2017

## 2017-06-05 NOTE — PLAN OF CARE
Problem: Physical Therapy Goal  Goal: Physical Therapy Goal  Goals to be met by: 14 days     Patient will increase functional independence with mobility by performin. Sit to stand transfer with Modified [place]  2. Bed to chair transfer with Modified [place] using Rolling Walker  3. Gait  x 50 feet with Supervision using Rolling Walker.   4. Wheelchair propulsion x150 feet with Modified [place] using bilateral uppper extremities  5. Ascend/descend 6 stair with bilateral Handrails Minimal Assistance  6. Stand for 3 minutes with Modified [place] using Rolling Walker  7. Increased functional strength to 5/5 for BLE except R knee extension.  8. Lower extremity exercise program x20 reps per handout, with independence  9. AROM of R knee flexion 90 degrees and extension 0 degrees     Outcome: Ongoing (interventions implemented as appropriate)  New goal added, goals remain appropriate

## 2017-06-06 PROCEDURE — 25000003 PHARM REV CODE 250: Performed by: HOSPITALIST

## 2017-06-06 PROCEDURE — 12000000 HC SNF SEMI-PRIVATE ROOM

## 2017-06-06 PROCEDURE — 97530 THERAPEUTIC ACTIVITIES: CPT

## 2017-06-06 PROCEDURE — 25000003 PHARM REV CODE 250: Performed by: STUDENT IN AN ORGANIZED HEALTH CARE EDUCATION/TRAINING PROGRAM

## 2017-06-06 RX ORDER — SYRING-NEEDL,DISP,INSUL,0.3 ML 29 G X1/2"
300 SYRINGE, EMPTY DISPOSABLE MISCELLANEOUS
Status: DISPENSED | OUTPATIENT
Start: 2017-06-06 | End: 2017-06-07

## 2017-06-06 RX ORDER — PSEUDOEPHEDRINE/ACETAMINOPHEN 30MG-500MG
100 TABLET ORAL
Status: DISPENSED | OUTPATIENT
Start: 2017-06-06 | End: 2017-06-07

## 2017-06-06 RX ADMIN — MORPHINE SULFATE 30 MG: 30 TABLET, EXTENDED RELEASE ORAL at 08:06

## 2017-06-06 RX ADMIN — AMITRIPTYLINE HYDROCHLORIDE 50 MG: 50 TABLET, FILM COATED ORAL at 10:06

## 2017-06-06 RX ADMIN — CARISOPRODOL 350 MG: 350 TABLET ORAL at 06:06

## 2017-06-06 RX ADMIN — ALUMINUM HYDROXIDE, MAGNESIUM HYDROXIDE, AND SIMETHICONE 15 ML: 200; 200; 20 SUSPENSION ORAL at 04:06

## 2017-06-06 RX ADMIN — HYDROCODONE BITARTRATE AND ACETAMINOPHEN 1 TABLET: 10; 325 TABLET ORAL at 04:06

## 2017-06-06 RX ADMIN — FLUTICASONE PROPIONATE 2 SPRAY: 50 SPRAY, METERED NASAL at 08:06

## 2017-06-06 RX ADMIN — ASPIRIN 325 MG ORAL TABLET 325 MG: 325 PILL ORAL at 08:06

## 2017-06-06 RX ADMIN — HYDROCODONE BITARTRATE AND ACETAMINOPHEN 1 TABLET: 10; 325 TABLET ORAL at 08:06

## 2017-06-06 RX ADMIN — HYDROCODONE BITARTRATE AND ACETAMINOPHEN 1 TABLET: 10; 325 TABLET ORAL at 10:06

## 2017-06-06 RX ADMIN — HYDROCODONE BITARTRATE AND ACETAMINOPHEN 1 TABLET: 10; 325 TABLET ORAL at 07:06

## 2017-06-06 RX ADMIN — STANDARDIZED SENNA CONCENTRATE AND DOCUSATE SODIUM 1 TABLET: 8.6; 5 TABLET, FILM COATED ORAL at 10:06

## 2017-06-06 RX ADMIN — PANTOPRAZOLE SODIUM 40 MG: 40 TABLET, DELAYED RELEASE ORAL at 08:06

## 2017-06-06 RX ADMIN — HYDROCODONE BITARTRATE AND ACETAMINOPHEN 1 TABLET: 10; 325 TABLET ORAL at 02:06

## 2017-06-06 RX ADMIN — ASPIRIN 325 MG ORAL TABLET 325 MG: 325 PILL ORAL at 10:06

## 2017-06-06 RX ADMIN — CARISOPRODOL 350 MG: 350 TABLET ORAL at 02:06

## 2017-06-06 RX ADMIN — RAMELTEON 8 MG: 8 TABLET, FILM COATED ORAL at 10:06

## 2017-06-06 RX ADMIN — CARISOPRODOL 350 MG: 350 TABLET ORAL at 10:06

## 2017-06-06 RX ADMIN — STANDARDIZED SENNA CONCENTRATE AND DOCUSATE SODIUM 1 TABLET: 8.6; 5 TABLET, FILM COATED ORAL at 08:06

## 2017-06-06 RX ADMIN — POLYETHYLENE GLYCOL 3350 17 G: 17 POWDER, FOR SOLUTION ORAL at 08:06

## 2017-06-06 RX ADMIN — MORPHINE SULFATE 30 MG: 30 TABLET, EXTENDED RELEASE ORAL at 10:06

## 2017-06-06 RX ADMIN — AMITRIPTYLINE HYDROCHLORIDE 50 MG: 50 TABLET, FILM COATED ORAL at 08:06

## 2017-06-06 NOTE — ASSESSMENT & PLAN NOTE
-Status post total right knee replacement 5/31/2017  -Surgical wound to be managed by ortho NP while at SNF  -Continue with current pain control with morphine 12hr, soma, hydrocodone-acetaminophen  -Continue bowel regimen for opioid induced constipation: miralax and senokot-s  -Continue with PT/OT for gait training and strengthening and restoration of ADL's: WBAT per ortho   -Fall precautions.   -Continue DVT prophylaxis with ASA 325mg bid

## 2017-06-06 NOTE — PLAN OF CARE
Problem: Patient Care Overview  Goal: Plan of Care Review  Outcome: Ongoing (interventions implemented as appropriate)   06/06/17 0707   Coping/Psychosocial   Plan Of Care Reviewed With patient

## 2017-06-06 NOTE — SUBJECTIVE & OBJECTIVE
Interval History: Seen patient at bedside. Yelling and screaming she will leave today.    Review of Systems   Unable to perform ROS: Other (Patient refusing to answer questions)     Objective:     Vital Signs (Most Recent):  Temp: 98.4 °F (36.9 °C) (06/06/17 0830)  Pulse: 94 (06/06/17 0830)  Resp: 19 (06/06/17 0830)  BP: 133/66 (06/06/17 0830)  SpO2: 99 % (06/06/17 0830) Vital Signs (24h Range):  Temp:  [98.3 °F (36.8 °C)-98.4 °F (36.9 °C)] 98.4 °F (36.9 °C)  Pulse:  [88-94] 94  Resp:  [18-19] 19  SpO2:  [98 %-99 %] 99 %  BP: (133-155)/(66-74) 133/66     Weight: 119.8 kg (264 lb 1.8 oz)  Body mass index is 48.31 kg/m².    Intake/Output Summary (Last 24 hours) at 06/06/17 1037  Last data filed at 06/06/17 0843   Gross per 24 hour   Intake              300 ml   Output                0 ml   Net              300 ml      Physical Exam   Constitutional: She appears well-developed and well-nourished.   Pulmonary/Chest: No respiratory distress.   Musculoskeletal:   Right knee with 2+ edema, Aquacel dressing intact with small amount of drainage, no erythema noted   Patient refusing physical exam    Significant Labs:    Recent Labs  Lab 05/31/17  0944 06/01/17  0944 06/05/17  0438   WBC 4.30  --  5.30   HGB 12.8 9.7* 8.2*   HCT 41.4  --  27.8*     --  327       Recent Labs  Lab 05/31/17  1340 06/05/17  0438    139   K 4.7 4.0    103   CO2 24 27   BUN 15 12   CREATININE 0.8 0.7   CALCIUM 9.1 9.3     Lab Results   Component Value Date    LABPROT 10.4 05/17/2017    ALBUMIN 4.3 11/01/2016     Lab Results   Component Value Date    CALCIUM 9.3 06/05/2017    PHOS 4.3 06/05/2017   Results for IBRAHIMA NIEVES (MRN [id number]) as of 6/6/2017 10:41   Ref. Range 6/5/2017 04:38   Magnesium Latest Ref Range: 1.6 - 2.6 mg/dL 1.9     Significant Imaging: n/a

## 2017-06-06 NOTE — PT/OT/SLP PROGRESS
Occupational Therapy  Treatment    Isabel Dennis   MRN: [id number]   Admitting Diagnosis: Primary osteoarthritis of right knee    OT Date of Treatment: 06/06/17  Total Time (min): 26 min    Billable Minutes:  Therapeutic Activity 26    General Precautions: Standard, fall (impulsive)  Orthopedic Precautions: RLE weight bearing as tolerated  Braces: N/A         Subjective:  Communicated with pt prior to session.  I am leaving first thing in the morning.  My sister is coming to get me.  I won the war.    Pt refused to [employer] for therapy    Pain/Comfort  Pain Rating 1:  (When asked how her pain level is she said don't bring that up)    Objective:  Patient found with: Polar ice (supine in bed)    Functional Status:  MDS G  Bed Mobility Functional Status: mod(I) - (I) (supine to and from sit-managed polar ice hookup)  Transfer Functional Status: mod(I) - (I) (bed and standard toilet with Rw)  Walk in Room Functional Status: mod(I) - (I) (in room and bathroom with RW)  Toilet Use Functional Status: mod(I) - (I) (standard commode)  Personal Hygiene Functional Status: mod(I) - (I) (standing at sink)  Moving from seated to standing position: Not steady, but able to stabilize without staff assistance  Walking (with assistive device if used): Not steady, but able to stabilize without staff assistance  Turning around and facing the opposite direction while walking: Not steady, but able to stabilize without staff assistance  Moving on and off the toilet: Not steady, but able to stabilize without staff assistance  Surface-to-surface transfer (transfer between bed and chair or wheelchair): Not steady, but able to stabilize without staff assistance    MDS GG  Eating Performance: Independent.  Oral Hygiene Performance: Independent.  Toileting Hygiene Performance: Independent.  Sit to lying Performance: Independent  Lying to sitting on side of bed Performance: Independent.  Sit to Stand Performance: Independent.  Chair/bed-to-chair  transfer Performance: Independent.  Toilet transfer Performance: Independent.       Additional Treatment:  Educated pt on importance of therapy and performing her knee exercises 1-3x/day. Pt verbalized understanding and reported she is performing them when she needs to and can perform after her specific regimen of pain meds given      Patient left supine with call button in reach and nurse present    ASSESSMENT:  Isabel Dennis is a 62 y.o. female with a medical diagnosis of Primary osteoarthritis of right knee and demonstrated indep with functional mobility in the room and bathroom and pt is able to perform toileting and standing at the sink to perform grooming;however, pt refused to participate in gym activities or practice other self care tasks as she is going home in the morning and she will have all of that done at home.    Rehab identified problem list/impairments: impaired endurance, impaired self care skills, impaired functional mobilty, decreased lower extremity function, decreased safety awareness, pain, orthopedic precautions    Rehab potential is fair    Activity tolerance: Fair    Discharge recommendations: home with home health     Barriers to discharge: Inaccessible home environment, Decreased caregiver support     Equipment recommendations: 3-in-1 commode, bath bench     GOALS:    Occupational Therapy Goals        Problem: Occupational Therapy Goal    Goal Priority Disciplines Outcome Interventions   Occupational Therapy Goal     OT, PT/OT Ongoing (interventions implemented as appropriate)                    Plan:  Patient to be seen 5 x/week to address the above listed problems via self-care/home management, therapeutic activities, therapeutic exercises  Plan of Care reviewed with: patient    MATT Davis  06/06/2017

## 2017-06-06 NOTE — HOSPITAL COURSE
"6/1/17: patient admitted to snf for ongoing therapy following a hospitalization for right total knee arthoplasty.  6/5/17: upon entering the room patient demanding to leave, patient screaming and cursing during conversation. Explained to the patient that I will get with therapy to determine safety level if she was discharge. Discussed the difference between being discharged and leaving AMA. Spoke with therapy, therapy feels that patient's safety will be fine inside the home but she hasn't been evaluated with stairs. Patient has 6 steps to get into her home. Spoke with patient about needing her to participate in PT so she could work with stairs. Patient became irate yelling and screaming "I'm not going to do what you want me to do. I'm going to do what I want to do and that is leave and not do stairs." Again explained the difference between being discharged and leaving AMA. Patient refusing to continue conversation and demanding I leave the room. I was notified later in the day that the patient will work with PT in the afternoon after her pain medication. I was notified by therapy that patient did perform steps and is safe to go home. Patient unable to have a ride home until tomorrow.  6/6/17: patient's family refusing to pick patient up until tomorrow. C/o constipation requesting brown bomb only.  6/7/17: family picked patient up early this am for discharge  "

## 2017-06-06 NOTE — PLAN OF CARE
Problem: Patient Care Overview  Goal: Plan of Care Review  Pt free of fall and injury. Pain controlled with hydrocodone and soma. Polar ice to right knee. Bed in lowest position, call light in reach, will continue to monitor pt.     Problem: Diabetes, Type 2 (Adult)  Goal: Signs and Symptoms of Listed Potential Problems Will be Absent, Minimized or Managed (Diabetes, Type 2)  Signs and symptoms of listed potential problems will be absent, minimized or managed by discharge/transition of care (reference Diabetes, Type 2 (Adult) CPG).    06/06/17 1602   Diabetes, Type 2   Problems Assessed (Type 2 Diabetes) all   Problems Present (Type 2 Diabetes) hyperglycemia       Problem: Fall Risk (Adult)  Goal: Identify Related Risk Factors and Signs and Symptoms  Related risk factors and signs and symptoms are identified upon initiation of Human Response Clinical Practice Guideline (CPG)    06/06/17 0708   Fall Risk   Related Risk Factors (Fall Risk) depression/anxiety;fear of falling;gait/mobility problems   Signs and Symptoms (Fall Risk) presence of risk factors     Goal: Absence of Falls  Patient will demonstrate the desired outcomes by discharge/transition of care.    06/06/17 1602   Fall Risk (Adult)   Absence of Falls making progress toward outcome

## 2017-06-06 NOTE — PLAN OF CARE
Problem: Fall Risk (Adult)  Goal: Identify Related Risk Factors and Signs and Symptoms  Related risk factors and signs and symptoms are identified upon initiation of Human Response Clinical Practice Guideline (CPG)   Outcome: Ongoing (interventions implemented as appropriate)   06/06/17 0708   Fall Risk   Related Risk Factors (Fall Risk) depression/anxiety;fear of falling;gait/mobility problems   Signs and Symptoms (Fall Risk) presence of risk factors

## 2017-06-06 NOTE — NURSING
Pt asked for something for her bowels. Spoke with RODOLFO Quan and she ordered enema. Pt refused enema. Pt stated she did not want to take miralax or lactulose. Pt stated she will drink magnesium when she gets discharge and go home on tomorrow. Advised Rodolfo Quan.

## 2017-06-06 NOTE — PROGRESS NOTES
[facility]  Progress Note    Patient Name: Isabel Dennis  MRN: [id number]  Code Status: Full Code  Admission Date: 6/1/2017  Length of Stay: 5 days  Attending Physician: Lacy Grire MD  Primary Care Provider: Sadia De La Rosa MD    Subjective:     Principal Problem:Primary osteoarthritis of right knee    HPI:  Patient is a 62 y.o. female who has a past medical history of Anxiety; Arthritis; DDD; Depression; Diabetes mellitus type 2; Diverticulosis; GERD; hyperlipidemia presented with chronic right knee pain due to osteoarthritis. She has failed non surgical treatment and elected to undergo definitive surgical treatment. She underwent right total knee arthroplasty on 5/31/17. Patient tolerated procedure well with no significant post operative complications. Patient has been working with PT/OT who recommend SNF for further balance/mobility training. Patient lives at home with her  in single story home and prior to surgery was independent with ADL's. Patient complains of severe pain that is not controlled with the pain medications. She has been in pain since surgery. She has worked with therapy which has exacerbated her pain. She is tolerating diet without difficulties. Patient currently denies any fever/chills, chest pain, dyspnea, weakness, numbness, abdominal pain, nausea/vomiting, dysuria/hematuria, or weight loss.     Hospital Course:  6/1/17: patient admitted to snf for ongoing therapy following a hospitalization for right total knee arthoplasty.  6/5/17: labs reviewed, upon entering the room patient demanding to leave, patient screaming and cursing during conversation. Explained to the patient that I will get with therapy to determine safety level if she was discharge. Discussed the difference between being discharged and leaving AMA. Spoke with therapy, therapy feels that patient's safety will be fine inside the home but she hasn't been evaluated with stairs. Patient has 6 steps  "to get into her home. Spoke with patient about needing her to participate in PT so she could work with stairs. Patient became irate yelling and screaming "I'm not going to do what you want me to do. I'm going to do what I want to do and that is leave and not do stairs." Again explained the difference between being discharged and leaving AMA. Patient refusing to continue conversation and demanding I leave the room. I was notified later in the day that the patient will work with PT in the afternoon after her pain medication. I was notified by therapy that patient did perform steps and is safe to go home. Patient unable to have a ride home until tomorrow.    Interval History: Seen patient at bedside. Yelling and screaming she will leave today.    Review of Systems   Unable to perform ROS: Other (Patient refusing to answer questions)     Objective:     Vital Signs (Most Recent):  Temp: 98.4 °F (36.9 °C) (06/06/17 0830)  Pulse: 94 (06/06/17 0830)  Resp: 19 (06/06/17 0830)  BP: 133/66 (06/06/17 0830)  SpO2: 99 % (06/06/17 0830) Vital Signs (24h Range):  Temp:  [98.3 °F (36.8 °C)-98.4 °F (36.9 °C)] 98.4 °F (36.9 °C)  Pulse:  [88-94] 94  Resp:  [18-19] 19  SpO2:  [98 %-99 %] 99 %  BP: (133-155)/(66-74) 133/66     Weight: 119.8 kg (264 lb 1.8 oz)  Body mass index is 48.31 kg/m².    Intake/Output Summary (Last 24 hours) at 06/06/17 1037  Last data filed at 06/06/17 0843   Gross per 24 hour   Intake              300 ml   Output                0 ml   Net              300 ml      Physical Exam   Constitutional: She appears well-developed and well-nourished.   Pulmonary/Chest: No respiratory distress.   Musculoskeletal:   Right knee with 2+ edema, Aquacel dressing intact with small amount of drainage, no erythema noted   Patient refusing physical exam    Significant Labs:    Recent Labs  Lab 05/31/17  0944 06/01/17  0944 06/05/17  0438   WBC 4.30  --  5.30   HGB 12.8 9.7* 8.2*   HCT 41.4  --  27.8*     --  327       Recent " Labs  Lab 05/31/17  1340 06/05/17  0438    139   K 4.7 4.0    103   CO2 24 27   BUN 15 12   CREATININE 0.8 0.7   CALCIUM 9.1 9.3     Lab Results   Component Value Date    LABPROT 10.4 05/17/2017    ALBUMIN 4.3 11/01/2016     Lab Results   Component Value Date    CALCIUM 9.3 06/05/2017    PHOS 4.3 06/05/2017   Results for IBRAHIMA NIEVES (MRN [id number]) as of 6/6/2017 10:41   Ref. Range 6/5/2017 04:38   Magnesium Latest Ref Range: 1.6 - 2.6 mg/dL 1.9     Significant Imaging: n/a    Assessment/Plan:      * Primary osteoarthritis of right knee    -Status post total right knee replacement 5/31/2017  -Surgical wound to be managed by ortho NP while at SNF  -Continue with current pain control with morphine 12hr, soma, hydrocodone-acetaminophen  -Continue bowel regimen for opioid induced constipation: miralax and senokot-s  -Continue with PT/OT for gait training and strengthening and restoration of ADL's: WBAT per ortho   -Fall precautions.   -Continue DVT prophylaxis with ASA 325mg bid        Status post total right knee replacement 5/31/2017    Plan as above.        Edema    -Low Na diet  -ANTHONY hose  -Leg elevation  -[facility]         Patient is [facility]    -Continue to monitor H/H.         Morbid obesity    -Low fat diet and gradual exercise.         GERD (gastroesophageal reflux disease)    -Continue Protonix to treat.         Allergic rhinitis, seasonal    -Continue Flonase to treat.         Diabetes mellitus, type 2    -Not treatment at home.  -Continue ADA diet  -refusing accu checks           Future Appointments  Date Time Provider Department Center   6/13/2017 1:00 PM Sharda Sandhu PA-C [facility] ORTHO Adam jacinto   6/13/2017 1:30 PM [facility] MRI1 [facility] MRI [facility]   6/13/2017 2:15 PM [facility] MRI1 [facility] MRI [facility]   6/27/2017 9:30 AM Sadia De La Rosa MD [facility] IM Adam jacinto PCW     Avelina Huang NP  [facility]

## 2017-06-07 VITALS
TEMPERATURE: 99 F | HEIGHT: 62 IN | OXYGEN SATURATION: 98 % | BODY MASS INDEX: 48.61 KG/M2 | WEIGHT: 264.13 LBS | DIASTOLIC BLOOD PRESSURE: 79 MMHG | SYSTOLIC BLOOD PRESSURE: 128 MMHG | RESPIRATION RATE: 20 BRPM | HEART RATE: 96 BPM

## 2017-06-07 PROCEDURE — 25000003 PHARM REV CODE 250: Performed by: STUDENT IN AN ORGANIZED HEALTH CARE EDUCATION/TRAINING PROGRAM

## 2017-06-07 PROCEDURE — 99900058 HC 022 PAID UNDER SNF PPS

## 2017-06-07 PROCEDURE — 25000003 PHARM REV CODE 250: Performed by: HOSPITALIST

## 2017-06-07 PROCEDURE — 99315 NF DSCHRG MGMT 30 MIN/LESS: CPT | Mod: ,,, | Performed by: INTERNAL MEDICINE

## 2017-06-07 RX ADMIN — STANDARDIZED SENNA CONCENTRATE AND DOCUSATE SODIUM 1 TABLET: 8.6; 5 TABLET, FILM COATED ORAL at 08:06

## 2017-06-07 RX ADMIN — ASPIRIN 325 MG ORAL TABLET 325 MG: 325 PILL ORAL at 08:06

## 2017-06-07 RX ADMIN — POLYETHYLENE GLYCOL 3350 17 G: 17 POWDER, FOR SOLUTION ORAL at 08:06

## 2017-06-07 RX ADMIN — MORPHINE SULFATE 30 MG: 30 TABLET, EXTENDED RELEASE ORAL at 08:06

## 2017-06-07 RX ADMIN — PANTOPRAZOLE SODIUM 40 MG: 40 TABLET, DELAYED RELEASE ORAL at 08:06

## 2017-06-07 RX ADMIN — CARISOPRODOL 350 MG: 350 TABLET ORAL at 06:06

## 2017-06-07 RX ADMIN — FLUTICASONE PROPIONATE 2 SPRAY: 50 SPRAY, METERED NASAL at 08:06

## 2017-06-07 RX ADMIN — HYDROCODONE BITARTRATE AND ACETAMINOPHEN 1 TABLET: 10; 325 TABLET ORAL at 06:06

## 2017-06-07 RX ADMIN — AMITRIPTYLINE HYDROCHLORIDE 50 MG: 50 TABLET, FILM COATED ORAL at 08:06

## 2017-06-07 NOTE — PT/OT/SLP DISCHARGE
Occupational Therapy Discharge Summary    Isabel Dennis  MRN: [id number]   Primary osteoarthritis of right knee   Patient Discharged from acute Occupational Therapy on 6/7/17.  Please refer to prior OT note dated on 6/6/17 for functional status.     Assessment:   Goals partially met.  Problem: Occupational Therapy Goal  Goal: Occupational Therapy Goal  Problem: Occupational Therapy Goal  Goal: Occupational Therapy Goal  Outcome: Ongoing (interventions implemented as appropriate)  Goals to be met by: 14 days     Patient will increase functional independence with ADLs by performing:     UE Dressing with [place].--met  LE Dressing with Modified [place].-not observed  Grooming while standing with Modified [place].-met  Toileting from toilet with Modified [place] for hygiene and clothing management. --met  Bathing from  tub bench with Supervision.  Toilet transfer to toilet with Modified [place]. --met  Complete ADL tasks and functional mobility with good safety awareness % of the time to decrease risk of falls.     Comments: MATT Ayers  6/2/2017  Outcome: Unable to achieve outcome(s) by discharge  MATT Davis  6/7/2017       Reasons for Discontinuation of Therapy Services  Transfer to alternate level of care.      Plan:Patient Discharged to: Home with Home Health Service   MATT Davis  6/7/2017  .

## 2017-06-07 NOTE — NURSING
Pt AAOx4. Sister at the bedside. Went over discharge instructions, medications and next appointments with patient. Pt verbalized understanding.. Pt received DME equipment on 6-6. Wheelchair was delivered to pt room on 6-6. Pt received original copy of hydrocodone 10mg prescription. Vitals taken, morning meds given. Pt left via wheelchair in car with sister. 1 person assist from wheelchair to car.

## 2017-06-07 NOTE — PLAN OF CARE
Problem: Patient Care Overview  Goal: Plan of Care Review  Pt free of fall and injury. Pain controlled with morphine. Polar ice to right knee. Went over discharge instructions, medicatons and next appointments. Pt verbalized understanding.        Problem: Diabetes, Type 2 (Adult)  Intervention: Support/Optimize Psychosocial Response to Condition   06/07/17 0730   Coping/Psychosocial Interventions   Supportive Measures active listening utilized   Environmental Support calm environment promoted       Goal: Signs and Symptoms of Listed Potential Problems Will be Absent, Minimized or Managed (Diabetes, Type 2)  Signs and symptoms of listed potential problems will be absent, minimized or managed by discharge/transition of care (reference Diabetes, Type 2 (Adult) CPG).    06/06/17 1602   Diabetes, Type 2   Problems Assessed (Type 2 Diabetes) all   Problems Present (Type 2 Diabetes) hyperglycemia       Problem: Fall Risk (Adult)  Goal: Identify Related Risk Factors and Signs and Symptoms  Related risk factors and signs and symptoms are identified upon initiation of Human Response Clinical Practice Guideline (CPG)    06/06/17 0708   Fall Risk   Related Risk Factors (Fall Risk) depression/anxiety;fear of falling;gait/mobility problems   Signs and Symptoms (Fall Risk) presence of risk factors     Goal: Absence of Falls  Patient will demonstrate the desired outcomes by discharge/transition of care.    06/07/17 0839   Fall Risk (Adult)   Absence of Falls making progress toward outcome

## 2017-06-07 NOTE — PLAN OF CARE
Patient to discharge home today with assist of family. Patient set [facility] with [facility] per RD Portillo.     Future Appointments  Date Time Provider Department Center   6/13/2017 1:00 PM Sharda Sadnhu PA-C [facility] ORTHO Adam [facility]   6/13/2017 1:30 PM [facility] MRI1 [facility] MRI Adam [facility]   6/13/2017 2:15 PM [facility] MRI1 [facility] MRI Adam [facility]   6/27/2017 9:30 AM Sadia De La Rosa MD [facility]        06/07/17 1011   Final Note   Assessment Type Final Discharge Note   Discharge Disposition Home   Discharge planning education complete? Yes   What phone number can be called within the next 1-3 days to see how you are doing after discharge? [phone]   Hospital Follow Up  Appt(s) scheduled? Yes   Discharge plans and expectations educations in teach back method with documentation complete? Yes   Referral to / orders for Home Health Complete? Yes   Any social issues identified prior to discharge? Yes  (see notes)   Did you assess the readiness or willingness of the family or caregiver to support self management of care? Yes     Sigrid Grimes RN, CM Skilled  [phone]

## 2017-06-07 NOTE — PROGRESS NOTES
Date: 06/01/2017  Time: 10:23 AM  Subject Initials:   IRB#: [id number]     Study: Prospective Evaluation of a Non-invasive Hemoglobin Measurement System in Total Join Arthroplasty     PI: Sunny Paul MD     I consented this patient at 10:23 AM        ·         Met with participant to discuss possible participation in a research study  ·         Participant was given a copy of the Informed Consent Form for review  ·         Participant read the Informed Consent Form in full  ·         All risks, benefits, alternative therapies, confidentiality, and study requirements were discussed  ·         Privacy issues and withdrawal options, including HIPAA, were discussed  ·         Ample opportunity was provided for participant to ask questions and to consider participation  ·         Participant verbalized that all questions were satisfactorily answered and that they understood the protocol and its requirements  ·         Participant was provided with contact information for the investigator, physician & research coordinator for future questions or concerns  ·         Participant denied involvement in any other research study  ·         Informed consent was signed and participant was provided with a signed consent form for his/her records.     Consent was obtained prior to conducting any study-related procedures.

## 2017-06-07 NOTE — PLAN OF CARE
Problem: Occupational Therapy Goal  Goal: Occupational Therapy Goal  Problem: Occupational Therapy Goal  Goal: Occupational Therapy Goal  Outcome: Ongoing (interventions implemented as appropriate)  Goals to be met by: 14 days     Patient will increase functional independence with ADLs by performing:     UE Dressing with [place].--met  LE Dressing with Modified [place].-not observed  Grooming while standing with Modified [place].-met  Toileting from toilet with Modified [place] for hygiene and clothing management. --met  Bathing from  tub bench with Supervision.  Toilet transfer to toilet with Modified [place]. --met  Complete ADL tasks and functional mobility with good safety awareness % of the time to decrease risk of falls.     Comments: MATT Ayers  6/2/2017  Outcome: Unable to achieve outcome(s) by discharge  MATT Davis  6/7/2017

## 2017-06-08 ENCOUNTER — PATIENT OUTREACH (OUTPATIENT)
Dept: ADMINISTRATIVE | Facility: CLINIC | Age: 62
End: 2017-06-08
Payer: MEDICARE

## 2017-06-08 NOTE — PATIENT INSTRUCTIONS
Fall Prevention  Falls often occur due to slipping, tripping or losing your balance. Millions of people fall every year and injure themselves. Here are ways to reduce your risk of falling again.  · Think about your fall, was there anything that caused your fall that can be fixed, removed, or replaced?  · Make your home safe by keeping walkways clear of objects you may trip over.  · Use non-slip pads under rugs. Do not use area rugs or small throw rugs.  · Use non-slip mats in bathtubs and showers.  · Install handrails and lights on staircases.  · Do not walk in poorly lit areas.  · Do not stand on chairs or wobbly ladders.  · Use caution when reaching overhead or looking upward. This position can cause a loss of balance.  · Be sure your shoes fit properly, have non-slip bottoms and are in good condition.   · Wear shoes both inside and out. Avoid going barefoot or wearing slippers.  · Be cautious when going up and down stairs, curbs, and when walking on uneven sidewalks.  · If your balance is poor, consider using a cane or walker.  · If your fall was related to alcohol use, stop or limit alcohol intake.   · If your fall was related to use of sleeping medicines, talk to your doctor about this. You may need to reduce your dosage at bedtime if you awaken during the night to go to the bathroom.    · To reduce the need for nighttime bathroom trips:  ¨ Avoid drinking fluids for several hours before going to bed  ¨ Empty your bladder before going to bed  ¨ Men can keep a urinal at the bedside  · Stay as active as you can. Balance, flexibility, strength, and endurance all come from exercise. They all play a role in preventing falls. Ask your healthcare provider which types of activity are right for you.  · Get your vision checked on a regular basis.  · If you have pets, know where they are before you stand up or walk so you don't trip over them.  · Use night lights.  Date Last Reviewed: 11/5/2015  © 9793-3808 [employer] [place]. [address] [place] [address], [place] [address]. All rights reserved. This information is not intended as a substitute for professional medical care. Always follow your healthcare professional's instructions.

## 2017-06-12 ENCOUNTER — TELEPHONE (OUTPATIENT)
Dept: INTERNAL MEDICINE | Facility: CLINIC | Age: 62
End: 2017-06-12

## 2017-06-12 NOTE — TELEPHONE ENCOUNTER
----- Message from Ramón Looney sent at 6/9/2017  2:08 PM CDT -----  Contact: Jamia Lawson  [phone]  Type: Orders Request    What orders/ testing are being requested? Bedside Commode @ Rolling [facility]    Is there a future appointment scheduled for the patient with PCP? Yes    When?06/27/2017    Comments: Please advice    Thanks

## 2017-06-13 ENCOUNTER — PATIENT OUTREACH (OUTPATIENT)
Dept: ADMINISTRATIVE | Facility: HOSPITAL | Age: 62
End: 2017-06-13
Payer: MEDICARE

## 2017-06-13 ENCOUNTER — OFFICE VISIT (OUTPATIENT)
Dept: ORTHOPEDICS | Facility: CLINIC | Age: 62
End: 2017-06-13
Payer: MEDICARE

## 2017-06-13 VITALS — HEIGHT: 62 IN | BODY MASS INDEX: 48.28 KG/M2 | WEIGHT: 262.38 LBS

## 2017-06-13 DIAGNOSIS — Z96.651 STATUS POST TOTAL RIGHT KNEE REPLACEMENT: Primary | ICD-10-CM

## 2017-06-13 PROCEDURE — 99999 PR PBB SHADOW E&M-EST. PATIENT-LVL III: CPT | Mod: PBBFAC,,, | Performed by: PHYSICIAN ASSISTANT

## 2017-06-13 PROCEDURE — 99213 OFFICE O/P EST LOW 20 MIN: CPT | Mod: PBBFAC | Performed by: PHYSICIAN ASSISTANT

## 2017-06-13 PROCEDURE — 99024 POSTOP FOLLOW-UP VISIT: CPT | Mod: ,,, | Performed by: PHYSICIAN ASSISTANT

## 2017-06-13 RX ORDER — HYDROCODONE BITARTRATE AND ACETAMINOPHEN 10; 325 MG/1; MG/1
1 TABLET ORAL
Qty: 90 TABLET | Refills: 0 | Status: SHIPPED | OUTPATIENT
Start: 2017-06-13

## 2017-06-13 NOTE — PROGRESS NOTES
Isabel Dennis presents for initial post-operative visit following a right total knee arthroplasty performed by Dr. [facility] on 5/31/2017. Home from [facility].    Exam:   There were no vitals taken for this visit.   Ambulating well with assistive device.  Incision is clean and dry without drainage or erythema. ROM:0-85    Initial post-operative radiographs reviewed today revealing a well fixed and aligned prosthesis.    A/P:  2 weeks s/p right total knee replacement  Dr. [facility] interviewed and examined patient today and agrees with plan.   - The patient was advised to keep the incision clean and dry for the next 24 hours after which she may wash the area with antibacterial soap in the shower. Will not submerge until the incision is completely healed.   - Outpatient PT: Pt will call when ready   - Continue ASA for 1 month from surgery.  - Pain medication refilled: Norco 10  - Follow up in 4 weeks with Dr. [facility]. Pt will call clinic with problems/concerns.

## 2017-06-14 ENCOUNTER — TELEPHONE (OUTPATIENT)
Dept: ORTHOPEDICS | Facility: CLINIC | Age: 62
End: 2017-06-14

## 2017-06-14 NOTE — TELEPHONE ENCOUNTER
----- Message from Adeline Livingston sent at 6/14/2017 12:03 PM CDT -----  Contact: [email or web address], [phone]  Jamia called in to request a rolling walker and bedside commode for Pt.    Thank you   Spoke with Jamia  according to the discharge note  The bedside commode & rolling walker were ordered from 2 different providers upon discharge  I offered her the names & numbers  She declined them

## 2017-06-26 ENCOUNTER — TELEPHONE (OUTPATIENT)
Dept: NEUROLOGY | Facility: CLINIC | Age: 62
End: 2017-06-26

## 2017-06-26 NOTE — TELEPHONE ENCOUNTER
----- Message from Angela Torres sent at 6/26/2017  9:23 AM CDT -----  Contact: PT  PT is requesting to reschedule MRI, please call [phone].

## 2017-07-05 ENCOUNTER — TELEPHONE (OUTPATIENT)
Dept: ORTHOPEDICS | Facility: CLINIC | Age: 62
End: 2017-07-05

## 2017-07-05 DIAGNOSIS — Z96.651 STATUS POST TOTAL RIGHT KNEE REPLACEMENT: Primary | ICD-10-CM

## 2017-07-05 NOTE — TELEPHONE ENCOUNTER
----- Message from Laura Gomez sent at 7/5/2017  1:51 PM CDT -----  Contact: self  Patient ask for a call in regards to scheduling outpatient physical therapy

## 2017-07-07 ENCOUNTER — CLINICAL SUPPORT (OUTPATIENT)
Dept: REHABILITATION | Facility: HOSPITAL | Age: 62
End: 2017-07-07
Attending: ORTHOPAEDIC SURGERY
Payer: MEDICARE

## 2017-07-07 DIAGNOSIS — R26.9 ABNORMALITY OF GAIT: ICD-10-CM

## 2017-07-07 DIAGNOSIS — G89.29 CHRONIC PAIN OF RIGHT KNEE: ICD-10-CM

## 2017-07-07 DIAGNOSIS — M25.60 RANGE OF MOTION DEFICIT: ICD-10-CM

## 2017-07-07 DIAGNOSIS — M25.561 CHRONIC PAIN OF RIGHT KNEE: ICD-10-CM

## 2017-07-07 PROCEDURE — 97162 PT EVAL MOD COMPLEX 30 MIN: CPT | Mod: PO | Performed by: PHYSICAL THERAPIST

## 2017-07-07 PROCEDURE — G8979 MOBILITY GOAL STATUS: HCPCS | Mod: CJ,PO | Performed by: PHYSICAL THERAPIST

## 2017-07-07 PROCEDURE — G8978 MOBILITY CURRENT STATUS: HCPCS | Mod: CK,PO | Performed by: PHYSICAL THERAPIST

## 2017-07-07 NOTE — PLAN OF CARE
Physical Therapy Evaluation    Name: Isabel Dennis  [facility] Number: [id number]        Diagnosis:   Encounter Diagnoses   Name Primary?    Chronic pain of right knee     Range of motion deficit     Abnormality of gait      Physician: [facility]ner, John L. Jr., MD  Treatment Orders: PT Eval and Treat    Past Medical History:   Diagnosis Date    Allergy     Anxiety     Arthritis     DDD (degenerative disc disease), cervical     Depression     Diabetes mellitus     Diabetes mellitus, type 2     Diverticulosis     GERD (gastroesophageal reflux disease)     Hx of colonic polyps     Lung disease     Other and unspecified hyperlipidemia     Post-traumatic osteoarthritis of both knees      Current Outpatient Prescriptions   Medication Sig    AMITIZA 24 mcg Cap Take by mouth as needed.     amitriptyline (ELAVIL) 50 MG tablet Take 50 mg by mouth 2 (two) times daily as needed.     aspirin 325 MG tablet Take 1 tablet (325 mg total) by mouth 2 (two) times daily.    b complex vitamins tablet Take 1 tablet by mouth once daily.    carisoprodol (SOMA) 350 MG tablet Take 350 mg by mouth 3 (three) times daily.      cod liver oil Oil Do not take until PCP resumes    docusate sodium (COLACE) 100 MG capsule Take 1 capsule (100 mg total) by mouth 2 (two) times daily as needed for Constipation.    ergocalciferol (VITAMIN D2) 50,000 unit Cap Take 1 capsule (50,000 Units total) by mouth twice a week.    esomeprazole (NEXIUM) 40 MG capsule Take 40 mg by mouth once daily.    fish oil-omega-3 fatty acids 300-1,000 mg capsule Do not take until resumed by PCP    flaxseed oil 1,000 mg Cap Pt states takes 4 daily, total of 4000 mg daily   DO not take until restarted by PCP    fluticasone (FLONASE) 50 mcg/actuation nasal spray 2 sprays by Each Nare route once daily. (Patient taking differently: 2 sprays by Each Nare route once daily. As needed)    FREESTYLE LITE STRIPS Strp     hydrocodone-acetaminophen 10-325mg (NORCO)   "mg Tab Take 1 tablet by mouth every 4 to 6 hours as needed for Pain.    naproxen (NAPROSYN) 500 MG tablet Do not take until resume by PCP    oxyMORphone (OPANA ER) 30 MG 12 hr tablet Take 30 mg by mouth every 12 (twelve) hours.    potassium chloride SA (K-DUR,KLOR-CON) 20 MEQ tablet Take 20 mEq by mouth once daily.     PSYLLIUM HUSK (METAMUCIL ORAL) Take by mouth Daily.     No current facility-administered medications for this visit.      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  Med History: OA, diabetes, anxiety, depression, GERD, lung disease  Occupation: none, used to be a marie      History of Present Illness: Isabel is a 62 y.o. female that presents to [facility] secondary to s/p R TKA with Dr Ochsner on 5/31/17. Isabel states she went to SNF after surgery, which she says didn't go well because they tried to "de-drug" her and take her off of her medicine. She says she had her  bring her other medications while she was in SNF because they weren't giving her enough medicine to control her pain. She was in SNF for 1 week. Says she did not want to participate in therapy at SNF. She has had home health since returning home.     Pt reports today she has taken 2- 10 mg norco, a soma, and a morphine prior to coming to therapy, and that she drove her self to her appointment.    Pt spent a large portion of her appointment talking about God " and [employer] and Racism and how doctors wont prescribe pain medicine to african americans, she was difficult to keep on task.     Imaging: X-ray taken and revealed Postoperative changes of right knee arthroplasty identified.  The position and alignment satisfactory.    Pain: current 7/10, worst 10/10, best 5/10, constant  Radicular symptoms: no  Aggravating factors: walking  Easing factors: medication    Pts goals: decreased pain      Objective     Observation: pt arrived 40 minutes late for her evaluation.      Pt ambulates with SC, antalgic gait, decreased stance time on R, R trendelenburg       Range of Motion:   Knee Left active Left Passive Right Active R passive   Flexion 95 100 110 NT   Extension 0 0 0 0       SLR: able to perform full SLR on R with no extension lag    Lower Extremity Strength: Will assess at follow up  Joint Mobility: R  Patellar and tibiofemoral joint mobility WNL    Palpation: scar is hypomobile and healing well, no TTP around the knee    Sensation: intact    Flexibility:    Ely's test: will assess at follow up   Popliteal Angle: WNL           Functional Limitations Reports - G Codes  Category: Motility  Tool: FOTO  Score: 52% limitation  Current:  CK 40<60%  Goal:  CJ 20<40%          PT Evaluation Completed? No  Discussed Plan of Care with patient: Yes      HEP provided: Quad sets, SLR, hip abd, heel slides, calf/towel stretch  Instructed pt. Regarding: Proper technique with all exercises. Pt demo good understanding of the education provided. Isabel demonstrated good return demonstration of activities.      Assessment     This is a 62 y.o. female referred to outpatient physical therapy and presents with a medical diagnosis of s/p R TKA and demonstrates limitations as described in the problem list. Pt will benefit from physcial therapy services in order to maximize pain free and/or functional use of right knee. The following goals were discussed with the patient and patient is in  agreement with them as to be addressed in the treatment plan. Pt was given a HEP consisting of exercises listed above. Pt verbally understood the instructions as they were given and demonstrated proper form and technique during therapy. Pt was advise to perform these exercises free of pain, and to stop performing them if pain occurs.     Anticipated barriers to physical therapy: pt lives in [place]    Medical necessity is demonstrated by the following IMPAIRMENTS/PROBLEM LIST:     History  Co-morbidities and personal factors that may impact the plan of care Examination  Body Structures and Functions, activity limitations and participation restrictions that may impact the plan of care Clinical Presentation   Decision Making/ Complexity Score   Co-morbidities:       OA, diabetes, anxiety, depression, GERD, lung disease          Personal Factors:   Pt is difficult to keep on task Body Regions:  knee  Body Systems:     Decreased ROM  Abnormal gait  Decreased strength      Activity limitations:   walking    Participation Restrictions:   Exercise, walking, difficulty shopping and doing house work such as cooking       Pt presents 6 weeks post R TKA, pt has full knee extension, some limitations in flexion. Pt is very difficult to keep focused and on task. Pt has antalgic gait with R trendelenburg.    Pt's condition is stable and non-evolvoing Mod complexity           GOALS:   Short Term Goals:  4 weeks  1. Pt will report decreased knee pain to 5/10 in order to increase tolerance for walking.  2. Pt will increase R knee fleixon ROM to 105 degrees in order to show improved functional mobility.  3. Pt will tolerate full LE muscle assessment in order to establish additional goals.   4. Pt will be independent and consistent with issued HEP in order to show carryover between therapy sessions.    Long Term Goals: 8 weeks  1. Pt will report decreased knee pain to 3/10 in order to increase tolerance for walking.  2. Pt will increase  R knee flexion ROM to 110 degree in order to improve function and mobility  3. Pt will report at CJ level (20-40% impaired) on FOTO knee survey score for knee pain disability to demonstrate an increase in LE function and mobility in home and community environment.   4. Pt will be independent with updated HEP to maintain gains following discharge with therapy.      Plan     Pt will be treated by physical therapy 1-2 times a week for 8 weeks for Pt Education, HEP, therapeutic exercises, neuromuscular re-education, joint mobilizations, modalities prn to achieve established goals. Isabel may at times be seen by a PTA as part of the Rehab Team.     Cont PT for  8         weeks.     Pancho Martin, PT 7/7/2017     I certify the need for these services furnished under this plan of treatment and while under my care.______________________________ Physician/Referring Practitioner  Date of Signature

## 2017-07-07 NOTE — PROGRESS NOTES
Physical Therapy Evaluation    Name: Isabel Dennis  [facility] Number: [id number]        Diagnosis:   Encounter Diagnoses   Name Primary?    Chronic pain of right knee     Range of motion deficit     Abnormality of gait      Physician: [facility], John L. Jr., MD  Treatment Orders: PT Eval and Treat    Past Medical History:   Diagnosis Date    Allergy     Anxiety     Arthritis     DDD (degenerative disc disease), cervical     Depression     Diabetes mellitus     Diabetes mellitus, type 2     Diverticulosis     GERD (gastroesophageal reflux disease)     Hx of colonic polyps     Lung disease     Other and unspecified hyperlipidemia     Post-traumatic osteoarthritis of both knees      Current Outpatient Prescriptions   Medication Sig    AMITIZA 24 mcg Cap Take by mouth as needed.     amitriptyline (ELAVIL) 50 MG tablet Take 50 mg by mouth 2 (two) times daily as needed.     aspirin 325 MG tablet Take 1 tablet (325 mg total) by mouth 2 (two) times daily.    b complex vitamins tablet Take 1 tablet by mouth once daily.    carisoprodol (SOMA) 350 MG tablet Take 350 mg by mouth 3 (three) times daily.      cod liver oil Oil Do not take until PCP resumes    docusate sodium (COLACE) 100 MG capsule Take 1 capsule (100 mg total) by mouth 2 (two) times daily as needed for Constipation.    ergocalciferol (VITAMIN D2) 50,000 unit Cap Take 1 capsule (50,000 Units total) by mouth twice a week.    esomeprazole (NEXIUM) 40 MG capsule Take 40 mg by mouth once daily.    fish oil-omega-3 fatty acids 300-1,000 mg capsule Do not take until resumed by PCP    flaxseed oil 1,000 mg Cap Pt states takes 4 daily, total of 4000 mg daily   DO not take until restarted by PCP    fluticasone (FLONASE) 50 mcg/actuation nasal spray 2 sprays by Each Nare route once daily. (Patient taking differently: 2 sprays by Each Nare route once daily. As needed)    FREESTYLE LITE STRIPS Strp     hydrocodone-acetaminophen 10-325mg (NORCO)   "mg Tab Take 1 tablet by mouth every 4 to 6 hours as needed for Pain.    naproxen (NAPROSYN) 500 MG tablet Do not take until resume by PCP    oxyMORphone (OPANA ER) 30 MG 12 hr tablet Take 30 mg by mouth every 12 (twelve) hours.    potassium chloride SA (K-DUR,KLOR-CON) 20 MEQ tablet Take 20 mEq by mouth once daily.     PSYLLIUM HUSK (METAMUCIL ORAL) Take by mouth Daily.     No current facility-administered medications for this visit.      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  Med History: OA, diabetes, anxiety, depression, GERD, lung disease  Occupation: none, used to be a marie      History of Present Illness: Isabel is a 62 y.o. female that presents to [facility] secondary to s/p R TKA with Dr Ochsner on 5/31/17. Isabel states she went to SNF after surgery, which she says didn't go well because they tried to "de-drug" her and take her off of her medicine. She says she had her  bring her other medications while she was in SNF because they weren't giving her enough medicine to control her pain. She was in SNF for 1 week. Says she did not want to participate in therapy at SNF. She has had home health since returning home.     Pt reports today she has taken 2- 10 mg norco, a soma, and a morphine prior to coming to therapy, and that she drove her self to her appointment.    Pt spent a large portion of her appointment talking about God " and [employer] and Racism and how doctors wont prescribe pain medicine to african americans, she was difficult to keep on task.     Imaging: X-ray taken and revealed Postoperative changes of right knee arthroplasty identified.  The position and alignment satisfactory.    Pain: current 7/10, worst 10/10, best 5/10, constant  Radicular symptoms: no  Aggravating factors: walking  Easing factors: medication    Pts goals: decreased pain      Objective     Observation: pt arrived 40 minutes late for her evaluation.      Pt ambulates with SC, antalgic gait, decreased stance time on R, R trendelenburg       Range of Motion:   Knee Left active Left Passive Right Active R passive   Flexion 95 100 110 NT   Extension 0 0 0 0       SLR: able to perform full SLR on R with no extension lag    Lower Extremity Strength: Will assess at follow up  Joint Mobility: R  Patellar and tibiofemoral joint mobility WNL    Palpation: scar is hypomobile and healing well, no TTP around the knee    Sensation: intact    Flexibility:    Ely's test: will assess at follow up   Popliteal Angle: WNL           Functional Limitations Reports - G Codes  Category: Motility  Tool: FOTO  Score: 52% limitation  Current:  CK 40<60%  Goal:  CJ 20<40%          PT Evaluation Completed? No  Discussed Plan of Care with patient: Yes      HEP provided: Quad sets, SLR, hip abd, heel slides, calf/towel stretch  Instructed pt. Regarding: Proper technique with all exercises. Pt demo good understanding of the education provided. Isabel demonstrated good return demonstration of activities.      Assessment     This is a 62 y.o. female referred to outpatient physical therapy and presents with a medical diagnosis of s/p R TKA and demonstrates limitations as described in the problem list. Pt will benefit from physcial therapy services in order to maximize pain free and/or functional use of right knee. The following goals were discussed with the patient and patient is in  agreement with them as to be addressed in the treatment plan. Pt was given a HEP consisting of exercises listed above. Pt verbally understood the instructions as they were given and demonstrated proper form and technique during therapy. Pt was advise to perform these exercises free of pain, and to stop performing them if pain occurs.     Anticipated barriers to physical therapy: pt lives in [place]    Medical necessity is demonstrated by the following IMPAIRMENTS/PROBLEM LIST:     History  Co-morbidities and personal factors that may impact the plan of care Examination  Body Structures and Functions, activity limitations and participation restrictions that may impact the plan of care Clinical Presentation   Decision Making/ Complexity Score   Co-morbidities:       OA, diabetes, anxiety, depression, GERD, lung disease          Personal Factors:   Pt is difficult to keep on task Body Regions:  knee  Body Systems:     Decreased ROM  Abnormal gait  Decreased strength      Activity limitations:   walking    Participation Restrictions:   Exercise, walking, difficulty shopping and doing house work such as cooking       Pt presents 6 weeks post R TKA, pt has full knee extension, some limitations in flexion. Pt is very difficult to keep focused and on task. Pt has antalgic gait with R trendelenburg.    Pt's condition is stable and non-evolvoing Mod complexity           GOALS:   Short Term Goals:  4 weeks  1. Pt will report decreased knee pain to 5/10 in order to increase tolerance for walking.  2. Pt will increase R knee fleixon ROM to 105 degrees in order to show improved functional mobility.  3. Pt will tolerate full LE muscle assessment in order to establish additional goals.   4. Pt will be independent and consistent with issued HEP in order to show carryover between therapy sessions.    Long Term Goals: 8 weeks  1. Pt will report decreased knee pain to 3/10 in order to increase tolerance for walking.  2. Pt will increase  R knee flexion ROM to 110 degree in order to improve function and mobility  3. Pt will report at CJ level (20-40% impaired) on FOTO knee survey score for knee pain disability to demonstrate an increase in LE function and mobility in home and community environment.   4. Pt will be independent with updated HEP to maintain gains following discharge with therapy.      Plan     Pt will be treated by physical therapy 1-2 times a week for 8 weeks for Pt Education, HEP, therapeutic exercises, neuromuscular re-education, joint mobilizations, modalities prn to achieve established goals. Isabel may at times be seen by a PTA as part of the Rehab Team.     Cont PT for  8         weeks.     Pancho Martin, PT 7/7/2017     I certify the need for these services furnished under this plan of treatment and while under my care.______________________________ Physician/Referring Practitioner  Date of Signature

## 2017-07-12 ENCOUNTER — TELEPHONE (OUTPATIENT)
Dept: ORTHOPEDICS | Facility: CLINIC | Age: 62
End: 2017-07-12

## 2017-07-12 NOTE — TELEPHONE ENCOUNTER
----- Message from Diana Rush sent at 7/12/2017  2:52 PM CDT -----  Contact: Pt  Pt would like to reschedule her 07/11 post op appt that she missed    Pt contact number [phone]  Thanks

## 2017-07-25 ENCOUNTER — HOSPITAL ENCOUNTER (OUTPATIENT)
Dept: RADIOLOGY | Facility: HOSPITAL | Age: 62
Discharge: HOME | End: 2017-07-25
Attending: ORTHOPAEDIC SURGERY
Payer: MEDICARE

## 2017-07-25 ENCOUNTER — OFFICE VISIT (OUTPATIENT)
Dept: ORTHOPEDICS | Facility: CLINIC | Age: 62
End: 2017-07-25
Payer: MEDICARE

## 2017-07-25 VITALS — BODY MASS INDEX: 48.28 KG/M2 | HEIGHT: 62 IN | WEIGHT: 262.38 LBS

## 2017-07-25 DIAGNOSIS — Z96.651 STATUS POST TOTAL RIGHT KNEE REPLACEMENT: Primary | ICD-10-CM

## 2017-07-25 DIAGNOSIS — M17.12 PRIMARY OSTEOARTHRITIS OF LEFT KNEE: ICD-10-CM

## 2017-07-25 DIAGNOSIS — M17.9 OSTEOARTHRITIS OF KNEE, UNSPECIFIED: ICD-10-CM

## 2017-07-25 PROCEDURE — 73560 X-RAY EXAM OF KNEE 1 OR 2: CPT | Mod: 26,LT,, | Performed by: RADIOLOGY

## 2017-07-25 PROCEDURE — 73560 X-RAY EXAM OF KNEE 1 OR 2: CPT | Mod: TC,LT

## 2017-07-25 PROCEDURE — 99999 PR PBB SHADOW E&M-EST. PATIENT-LVL III: CPT | Mod: PBBFAC,,, | Performed by: PHYSICIAN ASSISTANT

## 2017-07-25 PROCEDURE — 99024 POSTOP FOLLOW-UP VISIT: CPT | Mod: ,,, | Performed by: PHYSICIAN ASSISTANT

## 2017-07-25 NOTE — PROGRESS NOTES
"Isabel Dennis presents for 6 week post-operative visit following a right total knee arthroplasty performed by Dr. Ochsner on 5/31/2017. She has no right knee pain. She has severe pain in left knee. Using cane b/c of left knee pain.    Exam:   Height 5' 2" (1.575 m), weight 119 kg (262 lb 5.6 oz).   Ambulating well with assistive device.  Incision is clean and dry without drainage or erythema. ROM:0-115    Initial post-operative radiographs reviewed today revealing a well fixed and aligned prosthesis.    A/P:  6 weeks s/p right total knee replacement  Dr. Ochsner interviewed and examined patient today and agrees with plan.   - Continue home PT  - Follow up with Dr. O[facility]ner. Will plan for L TKA 9/25.   "

## 2017-07-28 ENCOUNTER — DOCUMENTATION ONLY (OUTPATIENT)
Dept: REHABILITATION | Facility: HOSPITAL | Age: 62
End: 2017-07-28

## 2017-07-28 NOTE — PROGRESS NOTES
PHYSICAL THERAPY DISCHARGE SUMMARY     Name: Isabel Dennis  [facility] Number: [id number]    Diagnosis: s/p TKA  Physician: John Ochsner Jr, MD  Treatment Orders: PT Eval and Treat  Past Medical History:   Diagnosis Date    Allergy     Anxiety     Arthritis     DDD (degenerative disc disease), cervical     Depression     Diabetes mellitus     Diabetes mellitus, type 2     Diverticulosis     GERD (gastroesophageal reflux disease)     Hx of colonic polyps     Lung disease     Other and unspecified hyperlipidemia     Post-traumatic osteoarthritis of both knees        Initial visit: 7/7/17  Date of Last visit: 7/7/17  Date of Discharge Note:  7/28/17  Total Visits Received: 1  Missed Visits: 7, no show 5 cancelled 2  ASSESSMENT   Status Towards Goals Met:  Pt did not meet any goals    Goals Not achieved and why: pt did not attend any visits after her evaluation    Discharge reason : Non-Compliance with attendance    G-CODE: Discharge g-code not filed due to discharge note being documentation only. Upon eval Pt was at CK (40<60%) on the FOTO with g-code goal set at CJ (20 < 40%).    GOALS:   Short Term Goals:  4 weeks  1. Pt will report decreased knee pain to 5/10 in order to increase tolerance for walking.  2. Pt will increase R knee fleixon ROM to 105 degrees in order to show improved functional mobility.  3. Pt will tolerate full LE muscle assessment in order to establish additional goals.   4. Pt will be independent and consistent with issued HEP in order to show carryover between therapy sessions.     Long Term Goals: 8 weeks  1. Pt will report decreased knee pain to 3/10 in order to increase tolerance for walking.  2. Pt will increase R knee flexion ROM to 110 degree in order to improve function and mobility  3. Pt will report at CJ level (20-40% impaired) on FOTO knee survey score for knee pain disability to demonstrate an increase in LE function and mobility in home and community environment.   4. Pt  will be independent with updated HEP to maintain gains following discharge with therapy.          PLAN   This patient is discharged from Physical Therapy Services.       Pancho Martin, PT 7/28/2017

## 2017-08-07 DIAGNOSIS — M17.9 OSTEOARTHRITIS OF KNEE, UNSPECIFIED: Primary | ICD-10-CM

## 2017-08-16 ENCOUNTER — TELEPHONE (OUTPATIENT)
Dept: ORTHOPEDICS | Facility: CLINIC | Age: 62
End: 2017-08-16

## 2017-08-17 ENCOUNTER — NURSE TRIAGE (OUTPATIENT)
Dept: ADMINISTRATIVE | Facility: CLINIC | Age: 62
End: 2017-08-17

## 2017-08-17 NOTE — TELEPHONE ENCOUNTER
Reason for Disposition   Patient wants to be seen    Protocols used: ST HIGH BLOOD PRESSURE-A-OH    Patient states her blood pressure is 160/95. Mrs. Dennis admits to treating her blood pressure with her 's lisinopril. Patient offered an appointment for today. She declined, patient decided to go to the ED.

## 2017-08-22 ENCOUNTER — OFFICE VISIT (OUTPATIENT)
Dept: INTERNAL MEDICINE | Facility: CLINIC | Age: 62
End: 2017-08-22
Payer: MEDICARE

## 2017-08-22 ENCOUNTER — TELEPHONE (OUTPATIENT)
Dept: INTERNAL MEDICINE | Facility: CLINIC | Age: 62
End: 2017-08-22

## 2017-08-22 ENCOUNTER — LAB VISIT (OUTPATIENT)
Dept: LAB | Facility: HOSPITAL | Age: 62
End: 2017-08-22
Attending: INTERNAL MEDICINE
Payer: MEDICARE

## 2017-08-22 VITALS
SYSTOLIC BLOOD PRESSURE: 142 MMHG | HEIGHT: 62 IN | DIASTOLIC BLOOD PRESSURE: 88 MMHG | BODY MASS INDEX: 47.45 KG/M2 | WEIGHT: 257.88 LBS | HEART RATE: 95 BPM

## 2017-08-22 DIAGNOSIS — E78.5 HYPERLIPIDEMIA, UNSPECIFIED HYPERLIPIDEMIA TYPE: ICD-10-CM

## 2017-08-22 DIAGNOSIS — I10 ESSENTIAL HYPERTENSION: ICD-10-CM

## 2017-08-22 DIAGNOSIS — M17.12 PRIMARY OSTEOARTHRITIS OF LEFT KNEE: ICD-10-CM

## 2017-08-22 DIAGNOSIS — R30.0 DYSURIA: ICD-10-CM

## 2017-08-22 DIAGNOSIS — Z01.818 PRE-OPERATIVE EXAMINATION FOR INTERNAL MEDICINE: ICD-10-CM

## 2017-08-22 DIAGNOSIS — Z96.651 STATUS POST TOTAL KNEE REPLACEMENT, RIGHT: ICD-10-CM

## 2017-08-22 DIAGNOSIS — F11.90 CHRONIC, CONTINUOUS USE OF OPIOIDS: ICD-10-CM

## 2017-08-22 DIAGNOSIS — B37.31 VAGINAL CANDIDIASIS: ICD-10-CM

## 2017-08-22 DIAGNOSIS — E11.9 TYPE 2 DIABETES MELLITUS WITHOUT COMPLICATION, WITHOUT LONG-TERM CURRENT USE OF INSULIN: ICD-10-CM

## 2017-08-22 DIAGNOSIS — R49.0 CHRONIC HOARSENESS: ICD-10-CM

## 2017-08-22 DIAGNOSIS — E11.9 TYPE 2 DIABETES MELLITUS WITHOUT COMPLICATION, WITHOUT LONG-TERM CURRENT USE OF INSULIN: Primary | ICD-10-CM

## 2017-08-22 LAB
ALBUMIN SERPL BCP-MCNC: 4.4 G/DL
ALP SERPL-CCNC: 50 U/L
ALT SERPL W/O P-5'-P-CCNC: 23 U/L
ANION GAP SERPL CALC-SCNC: 7 MMOL/L
AST SERPL-CCNC: 26 U/L
BACTERIA #/AREA URNS AUTO: ABNORMAL /HPF
BASOPHILS # BLD AUTO: 0.06 K/UL
BASOPHILS NFR BLD: 1.2 %
BILIRUB SERPL-MCNC: 0.3 MG/DL
BILIRUB UR QL STRIP: NEGATIVE
BUN SERPL-MCNC: 14 MG/DL
CALCIUM SERPL-MCNC: 9.8 MG/DL
CAOX CRY UR QL COMP ASSIST: ABNORMAL
CHLORIDE SERPL-SCNC: 105 MMOL/L
CHOLEST/HDLC SERPL: 3 {RATIO}
CLARITY UR REFRACT.AUTO: ABNORMAL
CO2 SERPL-SCNC: 26 MMOL/L
COLOR UR AUTO: ABNORMAL
CREAT SERPL-MCNC: 0.8 MG/DL
DIFFERENTIAL METHOD: ABNORMAL
EOSINOPHIL # BLD AUTO: 0.1 K/UL
EOSINOPHIL NFR BLD: 2 %
ERYTHROCYTE [DISTWIDTH] IN BLOOD BY AUTOMATED COUNT: 15.1 %
EST. GFR  (AFRICAN AMERICAN): >60 ML/MIN/1.73 M^2
EST. GFR  (NON AFRICAN AMERICAN): >60 ML/MIN/1.73 M^2
ESTIMATED AVG GLUCOSE: 166 MG/DL
GLUCOSE SERPL-MCNC: 117 MG/DL
GLUCOSE UR QL STRIP: NEGATIVE
GRAN CASTS UR QL COMP ASSIST: 4 /LPF
HBA1C MFR BLD HPLC: 7.4 %
HCT VFR BLD AUTO: 38.5 %
HDL/CHOLESTEROL RATIO: 33.3 %
HDLC SERPL-MCNC: 222 MG/DL
HDLC SERPL-MCNC: 74 MG/DL
HGB BLD-MCNC: 12 G/DL
HGB UR QL STRIP: NEGATIVE
HYALINE CASTS UR QL AUTO: 165 /LPF
KETONES UR QL STRIP: NEGATIVE
LDLC SERPL CALC-MCNC: 130.8 MG/DL
LEUKOCYTE ESTERASE UR QL STRIP: NEGATIVE
LYMPHOCYTES # BLD AUTO: 3.2 K/UL
LYMPHOCYTES NFR BLD: 64.3 %
MCH RBC QN AUTO: 26.1 PG
MCHC RBC AUTO-ENTMCNC: 31.2 G/DL
MCV RBC AUTO: 84 FL
MICROSCOPIC COMMENT: ABNORMAL
MONOCYTES # BLD AUTO: 0.4 K/UL
MONOCYTES NFR BLD: 8 %
NEUTROPHILS # BLD AUTO: 1.2 K/UL
NEUTROPHILS NFR BLD: 24.5 %
NITRITE UR QL STRIP: NEGATIVE
NONHDLC SERPL-MCNC: 148 MG/DL
PH UR STRIP: 5 [PH] (ref 5–8)
PLATELET # BLD AUTO: 299 K/UL
PMV BLD AUTO: 9.9 FL
POTASSIUM SERPL-SCNC: 4.6 MMOL/L
PROT SERPL-MCNC: 8.4 G/DL
PROT UR QL STRIP: NEGATIVE
RBC # BLD AUTO: 4.59 M/UL
SODIUM SERPL-SCNC: 138 MMOL/L
SP GR UR STRIP: 1.02 (ref 1–1.03)
SQUAMOUS #/AREA URNS AUTO: 2 /HPF
TRIGL SERPL-MCNC: 86 MG/DL
URN SPEC COLLECT METH UR: ABNORMAL
UROBILINOGEN UR STRIP-ACNC: NEGATIVE EU/DL
WBC # BLD AUTO: 5.01 K/UL
WBC #/AREA URNS AUTO: 0 /HPF (ref 0–5)

## 2017-08-22 PROCEDURE — 36415 COLL VENOUS BLD VENIPUNCTURE: CPT

## 2017-08-22 PROCEDURE — 83036 HEMOGLOBIN GLYCOSYLATED A1C: CPT

## 2017-08-22 PROCEDURE — 4010F ACE/ARB THERAPY RXD/TAKEN: CPT | Mod: ,,, | Performed by: INTERNAL MEDICINE

## 2017-08-22 PROCEDURE — 3077F SYST BP >= 140 MM HG: CPT | Mod: ,,, | Performed by: INTERNAL MEDICINE

## 2017-08-22 PROCEDURE — 3079F DIAST BP 80-89 MM HG: CPT | Mod: ,,, | Performed by: INTERNAL MEDICINE

## 2017-08-22 PROCEDURE — 3045F PR MOST RECENT HEMOGLOBIN A1C LEVEL 7.0-9.0%: CPT | Mod: ,,, | Performed by: INTERNAL MEDICINE

## 2017-08-22 PROCEDURE — 99214 OFFICE O/P EST MOD 30 MIN: CPT | Mod: S$PBB,,, | Performed by: INTERNAL MEDICINE

## 2017-08-22 PROCEDURE — 80061 LIPID PANEL: CPT

## 2017-08-22 PROCEDURE — 99213 OFFICE O/P EST LOW 20 MIN: CPT | Mod: PBBFAC | Performed by: INTERNAL MEDICINE

## 2017-08-22 PROCEDURE — 99999 PR PBB SHADOW E&M-EST. PATIENT-LVL III: CPT | Mod: PBBFAC,,, | Performed by: INTERNAL MEDICINE

## 2017-08-22 PROCEDURE — 80053 COMPREHEN METABOLIC PANEL: CPT

## 2017-08-22 PROCEDURE — 85025 COMPLETE CBC W/AUTO DIFF WBC: CPT

## 2017-08-22 RX ORDER — IBUPROFEN 800 MG/1
TABLET ORAL
COMMUNITY
Start: 2017-08-01 | End: 2021-06-09

## 2017-08-22 RX ORDER — FUROSEMIDE 20 MG/1
TABLET ORAL
COMMUNITY
Start: 2017-08-01 | End: 2019-04-09 | Stop reason: SDUPTHER

## 2017-08-22 RX ORDER — LISINOPRIL 20 MG/1
20 TABLET ORAL DAILY
Qty: 30 TABLET | Refills: 3 | Status: SHIPPED | OUTPATIENT
Start: 2017-08-22 | End: 2017-09-13 | Stop reason: SINTOL

## 2017-08-22 RX ORDER — ALPRAZOLAM 1 MG/1
1 TABLET ORAL NIGHTLY PRN
COMMUNITY
Start: 2017-07-14 | End: 2020-02-05

## 2017-08-22 RX ORDER — FLUCONAZOLE 150 MG/1
150 TABLET ORAL ONCE
Qty: 1 TABLET | Refills: 0 | Status: SHIPPED | OUTPATIENT
Start: 2017-08-22 | End: 2017-08-22
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
## 2017-08-23 LAB — BACTERIA UR CULT: NO GROWTH

## 2017-08-29 DIAGNOSIS — M79.605 PAIN OF LEFT LOWER EXTREMITY: Primary | ICD-10-CM

## 2017-09-12 ENCOUNTER — OFFICE VISIT (OUTPATIENT)
Dept: ORTHOPEDICS | Facility: CLINIC | Age: 62
End: 2017-09-12
Payer: MEDICARE

## 2017-09-12 ENCOUNTER — TELEPHONE (OUTPATIENT)
Dept: ORTHOPEDICS | Facility: CLINIC | Age: 62
End: 2017-09-12

## 2017-09-12 VITALS — BODY MASS INDEX: 46.65 KG/M2 | WEIGHT: 253.5 LBS | HEIGHT: 62 IN

## 2017-09-12 DIAGNOSIS — M17.12 PRIMARY OSTEOARTHRITIS OF LEFT KNEE: Primary | ICD-10-CM

## 2017-09-12 PROCEDURE — 99214 OFFICE O/P EST MOD 30 MIN: CPT | Mod: PBBFAC | Performed by: PHYSICIAN ASSISTANT

## 2017-09-12 PROCEDURE — 99999 PR PBB SHADOW E&M-EST. PATIENT-LVL IV: CPT | Mod: PBBFAC,,, | Performed by: PHYSICIAN ASSISTANT

## 2017-09-12 PROCEDURE — 99499 UNLISTED E&M SERVICE: CPT | Mod: S$PBB,,, | Performed by: PHYSICIAN ASSISTANT

## 2017-09-12 NOTE — TELEPHONE ENCOUNTER
----- Message from Alejandra Mccall sent at 9/12/2017 12:00 PM CDT -----  Contact: self   Pt is returning a call from Silvina and can be reached at [phone]

## 2017-09-12 NOTE — TELEPHONE ENCOUNTER
----- Message from Laura Gomez sent at 9/12/2017 11:41 AM CDT -----  Contact: self   Patient states don't cancel surgery Patient can be reached at home phone

## 2017-09-13 ENCOUNTER — TELEPHONE (OUTPATIENT)
Dept: ORTHOPEDICS | Facility: CLINIC | Age: 62
End: 2017-09-13

## 2017-09-13 ENCOUNTER — OFFICE VISIT (OUTPATIENT)
Dept: INTERNAL MEDICINE | Facility: CLINIC | Age: 62
End: 2017-09-13
Payer: MEDICARE

## 2017-09-13 VITALS
HEIGHT: 62 IN | WEIGHT: 255.69 LBS | HEART RATE: 92 BPM | OXYGEN SATURATION: 99 % | BODY MASS INDEX: 47.05 KG/M2 | DIASTOLIC BLOOD PRESSURE: 96 MMHG | SYSTOLIC BLOOD PRESSURE: 140 MMHG

## 2017-09-13 DIAGNOSIS — I10 ESSENTIAL HYPERTENSION: Primary | ICD-10-CM

## 2017-09-13 DIAGNOSIS — E78.5 HYPERLIPIDEMIA, UNSPECIFIED HYPERLIPIDEMIA TYPE: ICD-10-CM

## 2017-09-13 DIAGNOSIS — R06.02 SHORTNESS OF BREATH: ICD-10-CM

## 2017-09-13 DIAGNOSIS — E11.9 TYPE 2 DIABETES MELLITUS WITHOUT COMPLICATION, WITHOUT LONG-TERM CURRENT USE OF INSULIN: ICD-10-CM

## 2017-09-13 PROCEDURE — 3080F DIAST BP >= 90 MM HG: CPT | Mod: ,,, | Performed by: INTERNAL MEDICINE

## 2017-09-13 PROCEDURE — 3045F PR MOST RECENT HEMOGLOBIN A1C LEVEL 7.0-9.0%: CPT | Mod: ,,, | Performed by: INTERNAL MEDICINE

## 2017-09-13 PROCEDURE — 3077F SYST BP >= 140 MM HG: CPT | Mod: ,,, | Performed by: INTERNAL MEDICINE

## 2017-09-13 PROCEDURE — 93010 ELECTROCARDIOGRAM REPORT: CPT | Mod: ,,, | Performed by: INTERNAL MEDICINE

## 2017-09-13 PROCEDURE — 99214 OFFICE O/P EST MOD 30 MIN: CPT | Mod: S$PBB,,, | Performed by: INTERNAL MEDICINE

## 2017-09-13 PROCEDURE — 93005 ELECTROCARDIOGRAM TRACING: CPT | Mod: PBBFAC | Performed by: INTERNAL MEDICINE

## 2017-09-13 PROCEDURE — 99213 OFFICE O/P EST LOW 20 MIN: CPT | Mod: PBBFAC,25 | Performed by: INTERNAL MEDICINE

## 2017-09-13 PROCEDURE — 99999 PR PBB SHADOW E&M-EST. PATIENT-LVL III: CPT | Mod: PBBFAC,,, | Performed by: INTERNAL MEDICINE

## 2017-09-13 RX ORDER — GLIMEPIRIDE 1 MG/1
1 TABLET ORAL
Qty: 30 TABLET | Refills: 2 | Status: SHIPPED | OUTPATIENT
Start: 2017-09-13 | End: 2017-11-07 | Stop reason: SDUPTHER

## 2017-09-13 RX ORDER — AMLODIPINE BESYLATE 5 MG/1
5 TABLET ORAL DAILY
Qty: 30 TABLET | Refills: 2 | Status: SHIPPED | OUTPATIENT
Start: 2017-09-13 | End: 2018-01-11 | Stop reason: SDUPTHER

## 2017-09-13 RX ORDER — ATORVASTATIN CALCIUM 10 MG/1
10 TABLET, FILM COATED ORAL DAILY
Qty: 30 TABLET | Refills: 2 | Status: SHIPPED | OUTPATIENT
Start: 2017-09-13 | End: 2018-01-11 | Stop reason: SDUPTHER
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
## 2017-09-13 NOTE — TELEPHONE ENCOUNTER
----- Message from Adeline Livingston sent at 9/13/2017 11:03 AM CDT -----  Contact: self@home  Pt called in stating that she is across the street at Primary care and she will come by when she leaves to  her surgery packet.     Thank you

## 2017-09-13 NOTE — TELEPHONE ENCOUNTER
----- Message from Laura Gomez sent at 9/13/2017 10:27 AM CDT -----  Contact: self  Patient states missed a call from Silvina to set surgery for knee patient ask for a call at

## 2017-09-14 RX ORDER — POLYETHYLENE GLYCOL 3350 17 G/17G
17 POWDER, FOR SOLUTION ORAL DAILY
Status: CANCELLED | OUTPATIENT
Start: 2017-09-15

## 2017-09-14 RX ORDER — MIDAZOLAM HYDROCHLORIDE 5 MG/ML
1 INJECTION INTRAMUSCULAR; INTRAVENOUS EVERY 5 MIN PRN
Status: CANCELLED | OUTPATIENT
Start: 2017-09-14

## 2017-09-14 RX ORDER — ROPIVACAINE HYDROCHLORIDE 2 MG/ML
8 INJECTION, SOLUTION EPIDURAL; INFILTRATION; PERINEURAL CONTINUOUS
Status: CANCELLED | OUTPATIENT
Start: 2017-09-14

## 2017-09-14 RX ORDER — LIDOCAINE HYDROCHLORIDE 10 MG/ML
1 INJECTION, SOLUTION EPIDURAL; INFILTRATION; INTRACAUDAL; PERINEURAL
Status: CANCELLED | OUTPATIENT
Start: 2017-09-14

## 2017-09-14 RX ORDER — ONDANSETRON 2 MG/ML
4 INJECTION INTRAMUSCULAR; INTRAVENOUS EVERY 12 HOURS PRN
Status: CANCELLED | OUTPATIENT
Start: 2017-09-14

## 2017-09-14 RX ORDER — SODIUM CHLORIDE 9 MG/ML
INJECTION, SOLUTION INTRAVENOUS
Status: CANCELLED | OUTPATIENT
Start: 2017-09-14

## 2017-09-14 RX ORDER — ACETAMINOPHEN 500 MG
1000 TABLET ORAL EVERY 6 HOURS
Status: CANCELLED | OUTPATIENT
Start: 2017-09-14 | End: 2017-09-16

## 2017-09-14 RX ORDER — SODIUM CHLORIDE 9 MG/ML
INJECTION, SOLUTION INTRAVENOUS CONTINUOUS
Status: CANCELLED | OUTPATIENT
Start: 2017-09-14 | End: 2017-09-15

## 2017-09-14 RX ORDER — CELECOXIB 100 MG/1
400 CAPSULE ORAL
Status: CANCELLED | OUTPATIENT
Start: 2017-09-14

## 2017-09-14 RX ORDER — MORPHINE SULFATE 10 MG/ML
2 INJECTION, SOLUTION INTRAMUSCULAR; INTRAVENOUS
Status: CANCELLED | OUTPATIENT
Start: 2017-09-14

## 2017-09-14 RX ORDER — ASPIRIN 325 MG
325 TABLET, DELAYED RELEASE (ENTERIC COATED) ORAL 2 TIMES DAILY
Status: CANCELLED | OUTPATIENT
Start: 2017-09-14

## 2017-09-14 RX ORDER — RAMELTEON 8 MG/1
8 TABLET ORAL NIGHTLY PRN
Status: CANCELLED | OUTPATIENT
Start: 2017-09-14

## 2017-09-14 RX ORDER — BISACODYL 10 MG
10 SUPPOSITORY, RECTAL RECTAL EVERY 12 HOURS PRN
Status: CANCELLED | OUTPATIENT
Start: 2017-09-14

## 2017-09-14 RX ORDER — OXYCODONE HYDROCHLORIDE 5 MG/1
15 TABLET ORAL
Status: CANCELLED | OUTPATIENT
Start: 2017-09-14

## 2017-09-14 RX ORDER — OXYCODONE HYDROCHLORIDE 5 MG/1
5 TABLET ORAL
Status: CANCELLED | OUTPATIENT
Start: 2017-09-14

## 2017-09-14 RX ORDER — SODIUM CHLORIDE 0.9 % (FLUSH) 0.9 %
3 SYRINGE (ML) INJECTION EVERY 8 HOURS PRN
Status: CANCELLED | OUTPATIENT
Start: 2017-09-14

## 2017-09-14 RX ORDER — ACETAMINOPHEN 10 MG/ML
1000 INJECTION, SOLUTION INTRAVENOUS ONCE
Status: CANCELLED | OUTPATIENT
Start: 2017-09-14 | End: 2017-09-14

## 2017-09-14 RX ORDER — FAMOTIDINE 20 MG/1
20 TABLET, FILM COATED ORAL 2 TIMES DAILY
Status: CANCELLED | OUTPATIENT
Start: 2017-09-14

## 2017-09-14 RX ORDER — CELECOXIB 100 MG/1
200 CAPSULE ORAL DAILY
Status: CANCELLED | OUTPATIENT
Start: 2017-09-15

## 2017-09-14 RX ORDER — NALOXONE HCL 0.4 MG/ML
0.02 VIAL (ML) INJECTION
Status: CANCELLED | OUTPATIENT
Start: 2017-09-14

## 2017-09-14 RX ORDER — AMOXICILLIN 250 MG
1 CAPSULE ORAL 2 TIMES DAILY
Status: CANCELLED | OUTPATIENT
Start: 2017-09-14

## 2017-09-14 RX ORDER — MUPIROCIN 20 MG/G
1 OINTMENT TOPICAL
Status: CANCELLED | OUTPATIENT
Start: 2017-09-14

## 2017-09-14 RX ORDER — PREGABALIN 25 MG/1
75 CAPSULE ORAL NIGHTLY
Status: CANCELLED | OUTPATIENT
Start: 2017-09-14

## 2017-09-14 RX ORDER — OXYCODONE HYDROCHLORIDE 5 MG/1
10 TABLET ORAL
Status: CANCELLED | OUTPATIENT
Start: 2017-09-14

## 2017-09-14 RX ORDER — NAPROXEN SODIUM 220 MG/1
81 TABLET, FILM COATED ORAL DAILY
COMMUNITY
End: 2021-06-22 | Stop reason: DRUGHIGH

## 2017-09-14 RX ORDER — FENTANYL CITRATE 50 UG/ML
25 INJECTION, SOLUTION INTRAMUSCULAR; INTRAVENOUS EVERY 5 MIN PRN
Status: CANCELLED | OUTPATIENT
Start: 2017-09-14

## 2017-09-14 RX ORDER — PREGABALIN 25 MG/1
75 CAPSULE ORAL
Status: CANCELLED | OUTPATIENT
Start: 2017-09-14

## 2017-09-14 NOTE — PRE ADMISSION SCREENING
Anesthesia Assessment: Preoperative EQUATION    Planned Procedure: Procedure(s) (LRB):  REPLACEMENT-KNEE-TOTAL (Left)  Requested Anesthesia Type:Femoral Block  Surgeon: John L. Ochsner Jr., MD  Service: Orthopedics  Known or anticipated Date of Surgery:9/25/2017    Optimization:  Anesthesia Preop Clinic Assessment NOT  Indicated   Plan:    Testing:  PT/INR AM OF SURGEY       Consultation:Patient's PCP for a statement of optimization  STRESS TEST DONE AWAITING PCP TO REVIEW 9/15     Patient  has previously scheduled Medical Appointment:9/13    Navigation: Tests Scheduled.              Consults scheduled.             Results will be tracked by Preop Clinic.                 Straight Line to surgery.               No tests, anesthesia preop clinic visit.

## 2017-09-14 NOTE — H&P
CC: Left knee pain    Isabel Dennis is a 62 y.o. female with 2 year history of Left knee pain. Pain is worse with activity and weight bearing.  Patient has experienced interference of activities of daily living due to decreased range of motion and an increase in joint pain and swelling.  Patient has failed non-operative treatment including NSAIDs, corticosteroid injections, viscosupplement injections, and activity modification.  Isabel Dennis currently ambulates independently.  She had R TKA 5/2017 and did well.      Past Medical History:   Diagnosis Date    Allergy     Anxiety     Arthritis     DDD (degenerative disc disease), cervical     Depression     Diabetes mellitus     Diabetes mellitus, type 2     Diverticulosis     GERD (gastroesophageal reflux disease)     Hx of colonic polyps     Lung disease     Other and unspecified hyperlipidemia     Post-traumatic osteoarthritis of both knees        Past Surgical History:   Procedure Laterality Date    ADENOIDECTOMY      COLONOSCOPY      epiglottis      4 times    HYSTERECTOMY      Partial, fibroids    KNEE SURGERY      NECK SURGERY      SPINE SURGERY      TONSILLECTOMY      TOTAL KNEE ARTHROPLASTY Right 05/2017       Family History   Problem Relation Age of Onset    Cancer Father      throat     Hypertension Father     Heart disease Father     Cancer Brother     Heart failure Brother 62    Hypertension Mother     Arthritis Mother     Diabetes Paternal Aunt     Stroke Paternal Aunt     Breast cancer Neg Hx     Colon cancer Neg Hx     Ovarian cancer Neg Hx        Review of patient's allergies indicates:   Allergen Reactions    Iodinated contrast- oral and iv dye Hives and Rash     As per history has problem if has IV extravasation    Lisinopril Shortness Of Breath         Current Outpatient Prescriptions:     alprazolam (XANAX) 1 MG tablet, , Disp: , Rfl:     AMITIZA 24 mcg Cap, Take 24 mcg by mouth as needed. , Disp: ,  Rfl:     amitriptyline (ELAVIL) 50 MG tablet, Take 50 mg by mouth 2 (two) times daily as needed. , Disp: , Rfl:     amlodipine (NORVASC) 5 MG tablet, Take 1 tablet (5 mg total) by mouth once daily. For Blood Pressure., Disp: 30 tablet, Rfl: 2    aspirin 81 MG Chew, Take 81 mg by mouth once daily., Disp: , Rfl:     atorvastatin (LIPITOR) 10 MG tablet, Take 1 tablet (10 mg total) by mouth once daily. For Cholesterol., Disp: 30 tablet, Rfl: 2    b complex vitamins tablet, Take 1 tablet by mouth once daily., Disp: , Rfl:     carisoprodol (SOMA) 350 MG tablet, Take 350 mg by mouth 3 (three) times daily.  , Disp: , Rfl:     cod liver oil Oil, Do not take until PCP resumes, Disp: , Rfl: 0    ergocalciferol (VITAMIN D2) 50,000 unit Cap, Take 1 capsule (50,000 Units total) by mouth twice a week., Disp: 24 capsule, Rfl: 0    fish oil-omega-3 fatty acids 300-1,000 mg capsule, Do not take until resumed by PCP, Disp: , Rfl:     flaxseed oil 1,000 mg Cap, Pt states takes 4 daily, total of 4000 mg daily  DO not take until restarted by PCP, Disp: , Rfl: 0    fluticasone (FLONASE) 50 mcg/actuation nasal spray, 2 sprays by Each Nare route once daily. (Patient taking differently: 2 sprays by Each Nare route once daily. As needed), Disp: 1 Bottle, Rfl: 6    FREESTYLE LITE STRIPS Strp, , Disp: , Rfl:     furosemide (LASIX) 20 MG tablet, , Disp: , Rfl:     glimepiride (AMARYL) 1 MG tablet, Take 1 tablet (1 mg total) by mouth before breakfast. For Diabetes., Disp: 30 tablet, Rfl: 2    hydrocodone-acetaminophen 10-325mg (NORCO)  mg Tab, Take 1 tablet by mouth every 4 to 6 hours as needed for Pain., Disp: 90 tablet, Rfl: 0    ibuprofen (ADVIL,MOTRIN) 800 MG tablet, , Disp: , Rfl:     naproxen (NAPROSYN) 500 MG tablet, Do not take until resume by PCP, Disp: , Rfl:     potassium chloride SA (K-DUR,KLOR-CON) 20 MEQ tablet, Take 20 mEq by mouth once daily. , Disp: , Rfl:     PSYLLIUM HUSK (METAMUCIL ORAL), Take by mouth  "Daily., Disp: , Rfl:     Review of Systems:  Constitutional: no fever or chills  Eyes: no visual changes  ENT: no nasal congestion or sore throat  Respiratory: no cough or shortness of breath  Cardiovascular: no chest pain or palpitations  Gastrointestinal: no nausea or vomiting, tolerating diet  Genitourinary: no hematuria or dysuria  Integument/Breast: no rash or pruritis  Hematologic/Lymphatic: no easy bruising or lymphadenopathy  Musculoskeletal: positive for knee pain  Neurological: no seizures or tremors  Behavioral/Psych: no auditory or visual hallucinations  Endocrine: no heat or cold intolerance    PE:  Ht 5' 2" (1.575 m)   Wt 115 kg (253 lb 8.5 oz)   BMI 46.37 kg/m²   General: Pleasant, cooperative, NAD   Gait: antalgic  HEENT: NCAT, sclera nonicteric   Lungs: Respirations clear bilaterally; equal and unlabored.   CV: S1S2; 2+ bilateral upper and lower extremity pulses.   Skin: Intact throughout with no rashes, erythema, or lesions  Extremities: No LE edema,  no erythema or warmth of the skin in either lower extremity.    Left knee exam:  Knee Range of Motion: active, pain with passive range of motion  Effusion:yes  Condition of skin:intact  Location of tenderness:Medial joint line   Strength:4 of 5 quadriceps strength and 5 of 5 hamstring strength  Stability: stable to testing    Hip Examination:full painless range of motion, without tenderness    Radiographs: Radiographs reveal advanced degenerative changes including subchondral cyst formation, subchondral sclerosis, osteophyte formation, joint space narrowing.     Knee Alignment:  Severe varus    Diagnosis: osteoarthritis Left knee    Plan: Left total knee arthroplasty    Due to the serious nature of total joint infection and the high prevalence of community acquired MRSA, vancomycin will be used perioperatively.            "

## 2017-09-14 NOTE — PROGRESS NOTES
Isabel Dennis is a 62 y.o. year old here today for a pre-operative visit in preparation for a Left total knee arthroplasty to be performed by  Dr. [facility]ner on 9/25/2017.  she was last seen and treated in the clinic on 7/25/2017. she will be medically optimized by the pre op center. There has been no significant change in medical status since last visit. No fever, chills, malaise, or unexplained weight change.      Allergies, Medications, past medical and surgical history reviewed.    Focused examination performed.    Dr. Ochsner saw this patient today in clinic. All questions answered. Patient encouraged to call with questions. Contact information given.     Pre, nieves, and post operative procedures and expectations discussed. Questions were answered. Isabel Dennis has been educated and is ready to proceed with surgery. Approximately 30 minutes was spent discussing surgical outcomes, plans, procedures pre, nieves, and post operative expections and care.  Surgical consent signed.    Isabel Dennis will contact us if there are any questions, concerns, or changes in medical status prior to surgery.

## 2017-09-21 NOTE — PRE-PROCEDURE INSTRUCTIONS
Pt called concerning having stress test. Test has been scheduled after surgery. Dr. De La Rosa office called concerning test & has been rescheduled for 9/22 @ 7:30. Pt called & informed for test being scheduled for 9/22 @ 7:30. Pt stsate that she will be here tomorrow for test.

## 2017-09-22 ENCOUNTER — HOSPITAL ENCOUNTER (OUTPATIENT)
Dept: CARDIOLOGY | Facility: CLINIC | Age: 62
Discharge: HOME | DRG: 554 | End: 2017-09-22
Payer: MEDICARE

## 2017-09-22 ENCOUNTER — TELEPHONE (OUTPATIENT)
Dept: ORTHOPEDICS | Facility: CLINIC | Age: 62
End: 2017-09-22

## 2017-09-22 ENCOUNTER — TELEPHONE (OUTPATIENT)
Dept: INTERNAL MEDICINE | Facility: CLINIC | Age: 62
End: 2017-09-22

## 2017-09-22 DIAGNOSIS — R06.02 SHORTNESS OF BREATH: ICD-10-CM

## 2017-09-22 LAB
DIASTOLIC DYSFUNCTION: YES
RETIRED EF AND QEF - SEE NOTES: 50 (ref 55–65)

## 2017-09-22 PROCEDURE — 93321 DOPPLER ECHO F-UP/LMTD STD: CPT | Mod: 26,S$PBB,, | Performed by: INTERNAL MEDICINE

## 2017-09-22 PROCEDURE — 93351 STRESS TTE COMPLETE: CPT | Mod: 26,S$PBB,, | Performed by: INTERNAL MEDICINE

## 2017-09-22 NOTE — TELEPHONE ENCOUNTER
Stress test is negative, no sign of a blockage that would cause a heart attack. She does have very mild heart failure which may cause shortness of breath. It is very important that she take her blood pressure, diabetes and cholesterol medication to prevent this from worsening.

## 2017-09-24 ENCOUNTER — TELEPHONE (OUTPATIENT)
Dept: ORTHOPEDICS | Facility: CLINIC | Age: 62
End: 2017-09-24

## 2017-09-24 NOTE — TELEPHONE ENCOUNTER
Had to leave a message on her cell phone : Reminded to eat light the night before surgery, NPO after midnight, take hibclens shower the night before surgery  & again in the am , sleep on clean sheets  in clean clothes, no laxatives or stool softeners , report to the 2nd floor surgery unit for 9 am Monday morning=tomorrow

## 2017-09-25 ENCOUNTER — HOSPITAL ENCOUNTER (INPATIENT)
Facility: HOSPITAL | Age: 62
LOS: 1 days | Discharge: HOME | DRG: 554 | End: 2017-09-25
Attending: ORTHOPAEDIC SURGERY | Admitting: ORTHOPAEDIC SURGERY
Payer: MEDICARE

## 2017-09-25 ENCOUNTER — SURGERY (OUTPATIENT)
Age: 62
End: 2017-09-25

## 2017-09-25 VITALS
SYSTOLIC BLOOD PRESSURE: 127 MMHG | HEIGHT: 62 IN | RESPIRATION RATE: 18 BRPM | BODY MASS INDEX: 47.84 KG/M2 | HEART RATE: 85 BPM | WEIGHT: 260 LBS | OXYGEN SATURATION: 98 % | TEMPERATURE: 98 F | DIASTOLIC BLOOD PRESSURE: 70 MMHG

## 2017-09-25 DIAGNOSIS — M15.9 PRIMARY OSTEOARTHRITIS INVOLVING MULTIPLE JOINTS: Primary | ICD-10-CM

## 2017-09-25 DIAGNOSIS — M17.12 PRIMARY OSTEOARTHRITIS OF LEFT KNEE: ICD-10-CM

## 2017-09-25 PROCEDURE — 25000003 PHARM REV CODE 250: Performed by: PHYSICIAN ASSISTANT

## 2017-09-25 PROCEDURE — 12000002 HC ACUTE/MED SURGE SEMI-PRIVATE ROOM

## 2017-09-25 PROCEDURE — 99499 UNLISTED E&M SERVICE: CPT | Mod: ,,, | Performed by: ORTHOPAEDIC SURGERY

## 2017-09-25 PROCEDURE — 82962 GLUCOSE BLOOD TEST: CPT | Performed by: ORTHOPAEDIC SURGERY

## 2017-09-25 RX ORDER — IBUPROFEN 200 MG
24 TABLET ORAL
Status: DISCONTINUED | OUTPATIENT
Start: 2017-09-25 | End: 2017-09-26 | Stop reason: HOSPADM

## 2017-09-25 RX ORDER — IBUPROFEN 200 MG
16 TABLET ORAL
Status: DISCONTINUED | OUTPATIENT
Start: 2017-09-25 | End: 2017-09-26 | Stop reason: HOSPADM

## 2017-09-25 RX ORDER — MIDAZOLAM HYDROCHLORIDE 1 MG/ML
1 INJECTION INTRAMUSCULAR; INTRAVENOUS EVERY 5 MIN PRN
Status: DISCONTINUED | OUTPATIENT
Start: 2017-09-25 | End: 2017-09-26 | Stop reason: HOSPADM

## 2017-09-25 RX ORDER — INSULIN ASPART 100 [IU]/ML
1-10 INJECTION, SOLUTION INTRAVENOUS; SUBCUTANEOUS
Status: DISCONTINUED | OUTPATIENT
Start: 2017-09-25 | End: 2017-09-26 | Stop reason: HOSPADM

## 2017-09-25 RX ORDER — MUPIROCIN 20 MG/G
1 OINTMENT TOPICAL
Status: COMPLETED | OUTPATIENT
Start: 2017-09-25 | End: 2017-09-25

## 2017-09-25 RX ORDER — FUROSEMIDE 20 MG/1
20 TABLET ORAL DAILY
Status: DISCONTINUED | OUTPATIENT
Start: 2017-09-26 | End: 2017-09-26 | Stop reason: HOSPADM

## 2017-09-25 RX ORDER — PREGABALIN 75 MG/1
75 CAPSULE ORAL
Status: DISCONTINUED | OUTPATIENT
Start: 2017-09-25 | End: 2017-09-26 | Stop reason: HOSPADM

## 2017-09-25 RX ORDER — AMLODIPINE BESYLATE 5 MG/1
5 TABLET ORAL DAILY
Status: DISCONTINUED | OUTPATIENT
Start: 2017-09-25 | End: 2017-09-26 | Stop reason: HOSPADM

## 2017-09-25 RX ORDER — VANCOMYCIN HCL IN 5 % DEXTROSE 1.5G/250ML
1500 PLASTIC BAG, INJECTION (ML) INTRAVENOUS
Status: DISCONTINUED | OUTPATIENT
Start: 2017-09-25 | End: 2017-09-26 | Stop reason: HOSPADM

## 2017-09-25 RX ORDER — AMITRIPTYLINE HYDROCHLORIDE 50 MG/1
50 TABLET, FILM COATED ORAL 2 TIMES DAILY
Status: DISCONTINUED | OUTPATIENT
Start: 2017-09-26 | End: 2017-09-26 | Stop reason: HOSPADM

## 2017-09-25 RX ORDER — CELECOXIB 200 MG/1
400 CAPSULE ORAL
Status: DISCONTINUED | OUTPATIENT
Start: 2017-09-25 | End: 2017-09-26 | Stop reason: HOSPADM

## 2017-09-25 RX ORDER — ATORVASTATIN CALCIUM 10 MG/1
10 TABLET, FILM COATED ORAL DAILY
Status: DISCONTINUED | OUTPATIENT
Start: 2017-09-25 | End: 2017-09-26 | Stop reason: HOSPADM

## 2017-09-25 RX ORDER — LIDOCAINE HYDROCHLORIDE 10 MG/ML
1 INJECTION, SOLUTION EPIDURAL; INFILTRATION; INTRACAUDAL; PERINEURAL
Status: COMPLETED | OUTPATIENT
Start: 2017-09-25 | End: 2017-09-25

## 2017-09-25 RX ORDER — GLUCAGON 1 MG
1 KIT INJECTION
Status: DISCONTINUED | OUTPATIENT
Start: 2017-09-25 | End: 2017-09-26 | Stop reason: HOSPADM

## 2017-09-25 RX ORDER — FENTANYL CITRATE 50 UG/ML
25 INJECTION, SOLUTION INTRAMUSCULAR; INTRAVENOUS EVERY 5 MIN PRN
Status: DISCONTINUED | OUTPATIENT
Start: 2017-09-25 | End: 2017-09-26 | Stop reason: HOSPADM

## 2017-09-25 RX ORDER — POTASSIUM CHLORIDE 20 MEQ/1
20 TABLET, EXTENDED RELEASE ORAL DAILY
Status: DISCONTINUED | OUTPATIENT
Start: 2017-09-25 | End: 2017-09-26 | Stop reason: HOSPADM

## 2017-09-25 RX ORDER — SODIUM CHLORIDE 9 MG/ML
INJECTION, SOLUTION INTRAVENOUS
Status: DISCONTINUED | OUTPATIENT
Start: 2017-09-25 | End: 2017-09-26 | Stop reason: HOSPADM

## 2017-09-25 RX ADMIN — LIDOCAINE HYDROCHLORIDE 10 MG: 10 INJECTION, SOLUTION EPIDURAL; INFILTRATION; INTRACAUDAL; PERINEURAL at 10:09

## 2017-09-25 RX ADMIN — MUPIROCIN 1 G: 20 OINTMENT TOPICAL at 10:09

## 2017-09-25 NOTE — PROGRESS NOTES
"When assisting with preop of pt, pt states thinking about cancelling surgery.  Waited on IV and called into OR7 to have surgery nurse notify Dr. [facility] of above.  Relayed this info as well...Pt stated upset that celebrex and lyrica ordered preop.  States those medications did nothing to help her pain after surgery and had excruciating pain after last knee surgery.  States "I did not get good pain medicine after my last surgery because I am...(then pointed to arm)" Explained that some pain medicines work differently on people and we strive to take the very best care of our patients.  Pt continued stating that has been "hurting for 10 years".  Explained to pt Dr. O[facility]ner would be by after his current surgery to speak with pt about surgery and pain control.  Pt repeated did know if wanted to do surgery.  "

## 2017-09-25 NOTE — DISCHARGE SUMMARY
[facility]  Discharge Note  Short Stay    Admit Date: 9/25/2017    Discharge Date and Time: No discharge date for patient encounter.     Attending Physician: John L. [facility]ner Jr., MD     Discharge Provider: Billie Menjivar    Diagnoses:  Active Hospital Problems    Diagnosis  POA    Primary osteoarthritis of left knee [M17.12]  Yes      Resolved Hospital Problems    Diagnosis Date Resolved POA   No resolved problems to display.       Discharged Condition: stable    Hospital Course: Patient was admitted an inpatient surgery, she refused all perioperative care and left before surgery.     Final Diagnoses: Same as principal problem.    Disposition: Home or Self Care    Follow up/Patient Instructions:    Medications:  No discharge procedures on file.   No changes in medications during hospital visit.       Discharge Procedure Orders (must include Diet, Follow-up, Activity):  No discharge procedures on file.

## 2017-10-25 NOTE — PROGRESS NOTES
Md Mccoy at bedside to administer lidocaine bolus dose.   Discharge Under Care Of (Will Render 'self' If Left Blank): wife

## 2017-11-07 DIAGNOSIS — E11.9 TYPE 2 DIABETES MELLITUS WITHOUT COMPLICATION, WITHOUT LONG-TERM CURRENT USE OF INSULIN: ICD-10-CM

## 2017-11-08 RX ORDER — GLIMEPIRIDE 1 MG/1
1 TABLET ORAL
Qty: 30 TABLET | Refills: 5 | Status: SHIPPED | OUTPATIENT
Start: 2017-11-08 | End: 2018-03-26 | Stop reason: DRUGHIGH

## 2017-11-17 ENCOUNTER — PATIENT OUTREACH (OUTPATIENT)
Dept: ADMINISTRATIVE | Facility: HOSPITAL | Age: 62
End: 2017-11-17

## 2017-11-17 DIAGNOSIS — Z13.5 DIABETIC RETINOPATHY SCREENING: ICD-10-CM

## 2017-11-27 ENCOUNTER — NURSE TRIAGE (OUTPATIENT)
Dept: ADMINISTRATIVE | Facility: CLINIC | Age: 62
End: 2017-11-27

## 2017-11-27 NOTE — TELEPHONE ENCOUNTER
Reason for Disposition   [1] Coughed up blood AND [2] > 1 tablespoon (15 ml) (Exception: blood-tinged sputum)    Protocols used:  COUGHING UP BLOOD-CALLI    Isabel is calling to report she is coughing up chunks of blood.  Has been having a cold.  Per protocol advised ER.

## 2018-01-11 DIAGNOSIS — I10 ESSENTIAL HYPERTENSION: ICD-10-CM

## 2018-01-11 DIAGNOSIS — E78.5 HYPERLIPIDEMIA, UNSPECIFIED HYPERLIPIDEMIA TYPE: ICD-10-CM

## 2018-01-12 RX ORDER — ATORVASTATIN CALCIUM 10 MG/1
TABLET, FILM COATED ORAL
Qty: 30 TABLET | Refills: 5 | Status: SHIPPED | OUTPATIENT
Start: 2018-01-12 | End: 2018-06-20 | Stop reason: DRUGHIGH

## 2018-01-12 RX ORDER — AMLODIPINE BESYLATE 5 MG/1
TABLET ORAL
Qty: 30 TABLET | Refills: 5 | Status: SHIPPED | OUTPATIENT
Start: 2018-01-12 | End: 2018-06-19 | Stop reason: SDUPTHER

## 2018-03-15 ENCOUNTER — OFFICE VISIT (OUTPATIENT)
Dept: CARDIOLOGY | Facility: CLINIC | Age: 63
End: 2018-03-15
Payer: MEDICARE

## 2018-03-15 VITALS
DIASTOLIC BLOOD PRESSURE: 86 MMHG | WEIGHT: 266.75 LBS | SYSTOLIC BLOOD PRESSURE: 164 MMHG | BODY MASS INDEX: 48.79 KG/M2 | HEART RATE: 108 BPM | OXYGEN SATURATION: 98 % | RESPIRATION RATE: 24 BRPM

## 2018-03-15 DIAGNOSIS — E78.5 DYSLIPIDEMIA: ICD-10-CM

## 2018-03-15 DIAGNOSIS — R94.31 ABNORMAL EKG: ICD-10-CM

## 2018-03-15 DIAGNOSIS — E66.01 MORBID OBESITY: ICD-10-CM

## 2018-03-15 DIAGNOSIS — R07.9 CHEST PAIN, UNSPECIFIED TYPE: ICD-10-CM

## 2018-03-15 DIAGNOSIS — R06.02 SHORTNESS OF BREATH: Primary | ICD-10-CM

## 2018-03-15 DIAGNOSIS — M17.12 PRIMARY OSTEOARTHRITIS OF LEFT KNEE: ICD-10-CM

## 2018-03-15 DIAGNOSIS — R06.09 DYSPNEA ON EXERTION: ICD-10-CM

## 2018-03-15 DIAGNOSIS — E11.00 TYPE 2 DIABETES MELLITUS WITH HYPEROSMOLARITY WITHOUT COMA, WITHOUT LONG-TERM CURRENT USE OF INSULIN: ICD-10-CM

## 2018-03-15 DIAGNOSIS — Z91.89 AT RISK FOR CORONARY ARTERY DISEASE: ICD-10-CM

## 2018-03-15 DIAGNOSIS — I10 ESSENTIAL HYPERTENSION: ICD-10-CM

## 2018-03-15 DIAGNOSIS — M25.569 KNEE PAIN, UNSPECIFIED CHRONICITY, UNSPECIFIED LATERALITY: Primary | ICD-10-CM

## 2018-03-15 PROCEDURE — 99999 PR PBB SHADOW E&M-EST. PATIENT-LVL III: CPT | Mod: PBBFAC,,, | Performed by: INTERNAL MEDICINE

## 2018-03-15 PROCEDURE — 99204 OFFICE O/P NEW MOD 45 MIN: CPT | Mod: S$PBB,,, | Performed by: INTERNAL MEDICINE

## 2018-03-15 PROCEDURE — 99213 OFFICE O/P EST LOW 20 MIN: CPT | Mod: PBBFAC | Performed by: INTERNAL MEDICINE

## 2018-03-15 NOTE — PROGRESS NOTES
Subjective:    Patient ID:  Isabel Dennis is a 62 y.o. female who presents for evaluation of Shortness of Breath      HPI  Patient is here for cardiac evaluation with chest pain and shortness of breath.  She says that recently she was in the grocery store and slipped on a piece of carpet falling over 2 feet and hitting the meet container display.  She says she's had severe*substernal chest pain since that time.  She's had episodes in the past needed preop testing prior to knee replacement.  She says this is affected her breathing significantly.  She says now she is short of breath that one block.  She still has limiting discomfort from her left knee.  She presents a tolerated right knee surgery.  She denies any PND, orthopnea or lower edema.  She's not expressing dizziness, presyncope or syncope.    Review of Systems   Constitution: Negative.   HENT: Negative.    Eyes: Negative.    Cardiovascular: Positive for chest pain and dyspnea on exertion. Negative for irregular heartbeat, leg swelling, near-syncope, orthopnea, palpitations, paroxysmal nocturnal dyspnea and syncope.   Respiratory: Positive for shortness of breath.    Skin: Negative.    Musculoskeletal: Positive for joint pain.   Gastrointestinal: Negative for abdominal pain, constipation and diarrhea.   Genitourinary: Negative for dysuria.   Neurological: Negative.    Psychiatric/Behavioral: Negative.      Past Medical History:   Diagnosis Date    Allergy     Anxiety     Arthritis     DDD (degenerative disc disease), cervical     Depression     Diabetes mellitus     Diabetes mellitus, type 2     Diverticulosis     GERD (gastroesophageal reflux disease)     Hx of colonic polyps     Lung disease     Other and unspecified hyperlipidemia     Post-traumatic osteoarthritis of both knees      Past Surgical History:   Procedure Laterality Date    ADENOIDECTOMY      COLONOSCOPY      epiglottis      4 times    HYSTERECTOMY      Partial, fibroids     "KNEE SURGERY      NECK SURGERY      SPINE SURGERY      TONSILLECTOMY      TOTAL KNEE ARTHROPLASTY Right 05/2017     Social History   Substance Use Topics    Smoking status: Former Smoker     Packs/day: 1.00     Years: 20.00     Types: Cigarettes     Quit date: 1/26/2007    Smokeless tobacco: Never Used    Alcohol use Yes      Comment: "not enough to talk about "     Family History   Problem Relation Age of Onset    Cancer Father      throat     Hypertension Father     Heart disease Father     Cancer Brother     Heart failure Brother 62    Hypertension Mother     Arthritis Mother     Diabetes Paternal Aunt     Stroke Paternal Aunt     Breast cancer Neg Hx     Colon cancer Neg Hx     Ovarian cancer Neg Hx         Objective:    Physical Exam   Constitutional: She is oriented to person, place, and time. She appears well-developed and well-nourished.   HENT:   Head: Normocephalic and atraumatic.   Eyes: Conjunctivae and EOM are normal. Pupils are equal, round, and reactive to light.   Neck: Normal range of motion. Neck supple. No thyromegaly present.   Cardiovascular: Normal rate and regular rhythm.    No murmur heard.  Pulmonary/Chest: Effort normal and breath sounds normal. No respiratory distress.   Abdominal: Soft. Bowel sounds are normal.   Musculoskeletal: She exhibits no edema.   Neurological: She is alert and oriented to person, place, and time.   Skin: Skin is warm and dry.   Psychiatric: She has a normal mood and affect. Her behavior is normal.       ekg normal sinus rhythm nonspecific ST-T changes    DSE: 9-17  CONCLUSIONS     1 - Low normal to mildly depressed left ventricular systolic function (EF 50-55%).     2 - Normal right ventricular systolic function .     3 - Impaired LV relaxation, elevated LAP (grade 2 diastolic dysfunction).     No evidence of stress induced myocardial ischemia.   Assessment:       1. Shortness of breath    2. Chest pain, unspecified type    3. Essential " hypertension    4. Type 2 diabetes mellitus with hyperosmolarity without coma, without long-term current use of insulin    5. Dyslipidemia    6. Morbid obesity    7. Primary osteoarthritis of left knee    8. Abnormal EKG    9. At risk for coronary artery disease    10. Dyspnea on exertion         Plan:       -Multiple risk factors with current exertional symptoms, plan for better imaging modality with nuclear stress and repeat echo  *Cannot exercise with knee pain  -Encouraged diet and exercise as tolerated testing is within normal limits    Return to clinic in one month with testing ASAP

## 2018-03-22 ENCOUNTER — HOSPITAL ENCOUNTER (OUTPATIENT)
Dept: RADIOLOGY | Facility: HOSPITAL | Age: 63
Discharge: HOME | End: 2018-03-22
Attending: INTERNAL MEDICINE
Payer: MEDICARE

## 2018-03-22 ENCOUNTER — OFFICE VISIT (OUTPATIENT)
Dept: INTERNAL MEDICINE | Facility: CLINIC | Age: 63
End: 2018-03-22
Payer: MEDICARE

## 2018-03-22 ENCOUNTER — NURSE TRIAGE (OUTPATIENT)
Dept: ADMINISTRATIVE | Facility: CLINIC | Age: 63
End: 2018-03-22

## 2018-03-22 VITALS
WEIGHT: 266.81 LBS | TEMPERATURE: 98 F | SYSTOLIC BLOOD PRESSURE: 120 MMHG | BODY MASS INDEX: 49.1 KG/M2 | HEART RATE: 87 BPM | DIASTOLIC BLOOD PRESSURE: 88 MMHG | HEIGHT: 62 IN

## 2018-03-22 DIAGNOSIS — R07.9 CHEST PAIN, UNSPECIFIED TYPE: ICD-10-CM

## 2018-03-22 DIAGNOSIS — E66.01 MORBID OBESITY WITH BODY MASS INDEX (BMI) OF 45.0 TO 49.9 IN ADULT: ICD-10-CM

## 2018-03-22 DIAGNOSIS — I11.0 HYPERTENSIVE HEART DISEASE WITH HEART FAILURE: ICD-10-CM

## 2018-03-22 DIAGNOSIS — M54.2 ACUTE NECK PAIN: ICD-10-CM

## 2018-03-22 DIAGNOSIS — F11.20 OPIATE DEPENDENCE, CONTINUOUS: ICD-10-CM

## 2018-03-22 DIAGNOSIS — G44.319 ACUTE POST-TRAUMATIC HEADACHE, NOT INTRACTABLE: Primary | ICD-10-CM

## 2018-03-22 DIAGNOSIS — E11.9 TYPE 2 DIABETES MELLITUS WITHOUT COMPLICATION, WITHOUT LONG-TERM CURRENT USE OF INSULIN: ICD-10-CM

## 2018-03-22 DIAGNOSIS — E78.5 HYPERLIPIDEMIA, UNSPECIFIED HYPERLIPIDEMIA TYPE: ICD-10-CM

## 2018-03-22 DIAGNOSIS — M54.50 LOW BACK PAIN, NON-SPECIFIC: ICD-10-CM

## 2018-03-22 DIAGNOSIS — M54.9 ACUTE BACK PAIN, UNSPECIFIED BACK LOCATION, UNSPECIFIED BACK PAIN LATERALITY: ICD-10-CM
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
## 2018-03-22 PROBLEM — D69.6 THROMBOCYTOPENIA: Status: RESOLVED | Noted: 2017-05-17 | Resolved: 2018-03-22

## 2018-03-22 PROCEDURE — 72070 X-RAY EXAM THORAC SPINE 2VWS: CPT | Mod: 26,,, | Performed by: RADIOLOGY

## 2018-03-22 PROCEDURE — 72070 X-RAY EXAM THORAC SPINE 2VWS: CPT | Mod: TC

## 2018-03-22 PROCEDURE — 99214 OFFICE O/P EST MOD 30 MIN: CPT | Mod: S$PBB,,, | Performed by: INTERNAL MEDICINE

## 2018-03-22 PROCEDURE — 99999 PR PBB SHADOW E&M-EST. PATIENT-LVL IV: CPT | Mod: PBBFAC,,, | Performed by: INTERNAL MEDICINE

## 2018-03-22 PROCEDURE — 72100 X-RAY EXAM L-S SPINE 2/3 VWS: CPT | Mod: 26,,, | Performed by: RADIOLOGY

## 2018-03-22 PROCEDURE — 71046 X-RAY EXAM CHEST 2 VIEWS: CPT | Mod: 26,,, | Performed by: RADIOLOGY

## 2018-03-22 PROCEDURE — 71046 X-RAY EXAM CHEST 2 VIEWS: CPT | Mod: TC

## 2018-03-22 PROCEDURE — 72100 X-RAY EXAM L-S SPINE 2/3 VWS: CPT | Mod: TC

## 2018-03-22 PROCEDURE — 72040 X-RAY EXAM NECK SPINE 2-3 VW: CPT | Mod: TC

## 2018-03-22 PROCEDURE — 99214 OFFICE O/P EST MOD 30 MIN: CPT | Mod: PBBFAC,25 | Performed by: INTERNAL MEDICINE

## 2018-03-22 PROCEDURE — 72040 X-RAY EXAM NECK SPINE 2-3 VW: CPT | Mod: 26,,, | Performed by: RADIOLOGY

## 2018-03-22 RX ORDER — ESOMEPRAZOLE MAGNESIUM 40 MG/1
CAPSULE, DELAYED RELEASE ORAL
COMMUNITY
Start: 2018-03-12 | End: 2020-01-14 | Stop reason: ALTCHOICE

## 2018-03-22 RX ORDER — HYDROCODONE BITARTRATE AND ACETAMINOPHEN 5; 325 MG/1; MG/1
TABLET ORAL
COMMUNITY
Start: 2018-03-20 | End: 2018-04-03

## 2018-03-22 RX ORDER — LOSARTAN POTASSIUM 50 MG/1
TABLET ORAL
COMMUNITY
Start: 2018-03-12 | End: 2018-04-03

## 2018-03-22 NOTE — TELEPHONE ENCOUNTER
Reason for Disposition   Message left on identified answering machine.    Protocols used: ST NO CONTACT OR DUPLICATE CONTACT CALL-A-AH
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
## 2018-03-26 ENCOUNTER — TELEPHONE (OUTPATIENT)
Dept: INTERNAL MEDICINE | Facility: CLINIC | Age: 63
End: 2018-03-26

## 2018-03-26 ENCOUNTER — HOSPITAL ENCOUNTER (OUTPATIENT)
Dept: RADIOLOGY | Facility: HOSPITAL | Age: 63
Discharge: HOME | End: 2018-03-26
Attending: INTERNAL MEDICINE
Payer: MEDICARE

## 2018-03-26 DIAGNOSIS — G44.319 ACUTE POST-TRAUMATIC HEADACHE, NOT INTRACTABLE: ICD-10-CM
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
## 2018-03-26 DIAGNOSIS — E11.9 TYPE 2 DIABETES MELLITUS WITHOUT COMPLICATION, WITHOUT LONG-TERM CURRENT USE OF INSULIN: ICD-10-CM

## 2018-03-26 PROCEDURE — 70450 CT HEAD/BRAIN W/O DYE: CPT | Mod: TC

## 2018-03-26 PROCEDURE — 70450 CT HEAD/BRAIN W/O DYE: CPT | Mod: 26,,, | Performed by: RADIOLOGY

## 2018-03-26 RX ORDER — GLIMEPIRIDE 2 MG/1
2 TABLET ORAL
Qty: 90 TABLET | Refills: 2 | Status: SHIPPED | OUTPATIENT
Start: 2018-03-26 | End: 2018-06-20 | Stop reason: DRUGHIGH
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
## 2018-04-02 ENCOUNTER — NURSE TRIAGE (OUTPATIENT)
Dept: ADMINISTRATIVE | Facility: CLINIC | Age: 63
End: 2018-04-02

## 2018-04-02 NOTE — TELEPHONE ENCOUNTER
Reason for Disposition   Chest pain lasting longer than 5 minutes and ANY of the following:* Over 50 years old* Over 30 years old and at least one cardiac risk factor (i.e., high blood pressure, diabetes, high cholesterol, obesity, smoker or strong family history of heart disease)* Pain is crushing, pressure-like, or heavy * Took nitroglycerin and chest pain was not relieved* History of heart disease (i.e., angina, heart attack, bypass surgery, angioplasty, CHF)    Protocols used: ST CHEST PAIN-A-OH

## 2018-04-03 ENCOUNTER — INITIAL CONSULT (OUTPATIENT)
Dept: OPHTHALMOLOGY | Facility: CLINIC | Age: 63
End: 2018-04-03
Payer: MEDICARE

## 2018-04-03 DIAGNOSIS — H04.123 INSUFFICIENCY OF TEAR FILM OF BOTH EYES: ICD-10-CM

## 2018-04-03 DIAGNOSIS — E11.36 TYPE 2 DIABETES MELLITUS WITH DIABETIC CATARACT, WITHOUT LONG-TERM CURRENT USE OF INSULIN: Primary | ICD-10-CM

## 2018-04-03 PROCEDURE — 99212 OFFICE O/P EST SF 10 MIN: CPT | Mod: PBBFAC | Performed by: OPHTHALMOLOGY

## 2018-04-03 PROCEDURE — 99999 PR PBB SHADOW E&M-EST. PATIENT-LVL II: CPT | Mod: PBBFAC,,, | Performed by: OPHTHALMOLOGY

## 2018-04-03 PROCEDURE — 92004 COMPRE OPH EXAM NEW PT 1/>: CPT | Mod: S$PBB,,, | Performed by: OPHTHALMOLOGY

## 2018-04-03 NOTE — LETTER
April 3, 2018      Sadia De La Rosa MD  [address]  [facility] [address]           [facility]  [address]  [facility] [address]  Phone: [phone]  Fax: [phone]          Patient: Isabel Dennis   MR Number: [id number]   YOB: 1955   Date of Visit: 4/3/2018       Dear Dr. Sadia De La Rosa:    Thank you for referring Isabel Dennis to me for evaluation. Attached you will find relevant portions of my assessment and plan of care.    If you have questions, please do not hesitate to call me. I look forward to following Isabel Dennis along with you.    Sincerely,    Todd Teague MD    Enclosure  CC:  No Recipients    If you would like to receive this communication electronically, please contact [email or web address] or [phone] to request more information on [employer] Link access.    For providers and/or their staff who would like to refer a patient to [facility], please contact us through our one-stop-shop provider referral line, [facility] , at [phone].    If you feel you have received this communication in error or would no longer like to receive these types of communications, please e-mail [email or web address]

## 2018-04-03 NOTE — PATIENT INSTRUCTIONS
Artificial tears during the day.  Lubricating ointment or gel at bedtime.  Repeat eye exam in one year.

## 2018-04-03 NOTE — PROGRESS NOTES
HPI     Pt. States she fell 2/22/18 hit her head. Doesn't remember how she fell.   Has had severe headaches ever since fall. Worried about a brain tumor.   Eyes are blood red in the mornings clears up after having coffee. No eye   pain. No drops.  Last eye exam over 6 months ago, at [facility]  Went to [facility] ER after fall had a CT done, and was told she needs an MRI   she has a metal plate in her neck.    I have personally interviewed the patient, reviewed the history and   examined the patient and agree with the technician's exam.    Her eyes tear a lot.    Last edited by Todd Teague MD on 4/3/2018 10:20 AM. (History)            Assessment /Plan     For exam results, see Encounter Report.    Type 2 diabetes mellitus with diabetic cataract, without long-term current use of insulin    Insufficiency of tear film of both eyes      Ms. Dennis is not ready for cataract surgery. Her eye redness is related to her dry eyes. I recommended using artificial tears during the day and a lubricating ointment or gel at bedtime. She should have a re[eat eye exam in one year.

## 2018-04-06 ENCOUNTER — TELEPHONE (OUTPATIENT)
Dept: ORTHOPEDICS | Facility: CLINIC | Age: 63
End: 2018-04-06

## 2018-04-06 NOTE — TELEPHONE ENCOUNTER
"Patient called into ortho phone staff, when staff answered pt was having an anxiety attack mentioning that she has been trying to get dressed since 7am and still was not dressed. Phone call was transferred to me and while speaking with patient, patient mentioned she was "feeling out of control and in pain" also patient said that "something kept telling her to knock herself out lastnight". Patient was in great distress. Patients  walked in and I spoke to pts  and advised him to bring his wife into the [facility]. Patient said she fell and hit her head and could not remember anything.    "

## 2018-04-09 ENCOUNTER — TELEPHONE (OUTPATIENT)
Dept: INTERNAL MEDICINE | Facility: CLINIC | Age: 63
End: 2018-04-09

## 2018-04-09 NOTE — TELEPHONE ENCOUNTER
----- Message from Blanca Gutierrez sent at 4/9/2018  8:40 AM CDT -----  Contact: Patient [phone]  Patient stated that she was suppose to be checked into the hospital on Friday 04/06/2017 but was unable to get there due to transportation. States that she can come today and would like to speak to you prior to coming.    Please call and advise.    Thank You

## 2018-04-09 NOTE — TELEPHONE ENCOUNTER
----- Message from Blanca Gutierrez sent at 4/9/2018  8:40 AM CDT -----  Contact: Patient [phone]  Patient stated that she was suppose to be checked into the hospital on Friday 04/06/2017 but was unable to get there due to transportation. States that she can come today and would like to speak to you prior to coming.    Please call and advise.    Thank You

## 2018-04-09 NOTE — TELEPHONE ENCOUNTER
Pt state have  head, neck , back , and stomach pain  She was advised to report to the Ed on Friday  Due to transportation issues she was unable to get there.  Pt is going to the ED today

## 2018-04-16 ENCOUNTER — HOSPITAL ENCOUNTER (OUTPATIENT)
Dept: RADIOLOGY | Facility: HOSPITAL | Age: 63
Discharge: HOME | End: 2018-04-16
Attending: PHYSICIAN ASSISTANT
Payer: MEDICARE

## 2018-04-16 ENCOUNTER — OFFICE VISIT (OUTPATIENT)
Dept: ORTHOPEDICS | Facility: CLINIC | Age: 63
End: 2018-04-16
Payer: MEDICARE

## 2018-04-16 VITALS — HEIGHT: 62 IN | BODY MASS INDEX: 49.09 KG/M2 | WEIGHT: 266.75 LBS

## 2018-04-16 DIAGNOSIS — M25.551 PAIN OF BOTH HIP JOINTS: ICD-10-CM

## 2018-04-16 DIAGNOSIS — M25.569 KNEE PAIN, UNSPECIFIED CHRONICITY, UNSPECIFIED LATERALITY: ICD-10-CM

## 2018-04-16 DIAGNOSIS — M25.552 PAIN OF BOTH HIP JOINTS: ICD-10-CM

## 2018-04-16 DIAGNOSIS — M17.12 PRIMARY OSTEOARTHRITIS OF LEFT KNEE: Primary | ICD-10-CM

## 2018-04-16 DIAGNOSIS — M54.2 CERVICAL PAIN: ICD-10-CM

## 2018-04-16 PROCEDURE — 73521 X-RAY EXAM HIPS BI 2 VIEWS: CPT | Mod: TC

## 2018-04-16 PROCEDURE — 73521 X-RAY EXAM HIPS BI 2 VIEWS: CPT | Mod: 26,,, | Performed by: RADIOLOGY

## 2018-04-16 PROCEDURE — 73562 X-RAY EXAM OF KNEE 3: CPT | Mod: 26,50,, | Performed by: RADIOLOGY

## 2018-04-16 PROCEDURE — 99213 OFFICE O/P EST LOW 20 MIN: CPT | Mod: S$PBB,,, | Performed by: PHYSICIAN ASSISTANT

## 2018-04-16 PROCEDURE — 99214 OFFICE O/P EST MOD 30 MIN: CPT | Mod: PBBFAC,25 | Performed by: PHYSICIAN ASSISTANT

## 2018-04-16 PROCEDURE — 99999 PR PBB SHADOW E&M-EST. PATIENT-LVL IV: CPT | Mod: PBBFAC,,, | Performed by: PHYSICIAN ASSISTANT

## 2018-04-16 PROCEDURE — 73562 X-RAY EXAM OF KNEE 3: CPT | Mod: TC,50

## 2018-04-16 NOTE — PROGRESS NOTES
"  SUBJECTIVE:     Chief Complaint : left leg pain, right leg pain    History of Present Illness:  Isabel Dennis is a 63 y.o. female seen in clinic today with a chief complaint of pain in both legs. Pt fell in grocery store in end of February. She has had pain in legs since that time. Pt is s/p R TKA 5/2017. She is not having pain in right knee but does ache in leg. She has left knee pain. Pt was evaluated in ED with head CT and has followed up with PCP regarding chest discomfort. Pain in legs has improved somewhat.  She has been taking Motrin. She is ambulating with cane.    Past Medical History:   Diagnosis Date    Allergy     Anxiety     Arthritis     DDD (degenerative disc disease), cervical     Depression     Diabetes mellitus     Diabetes mellitus, type 2     Diverticulosis     GERD (gastroesophageal reflux disease)     Hx of colonic polyps     Lung disease     Other and unspecified hyperlipidemia     Post-traumatic osteoarthritis of both knees        Review of Systems:  Constitutional: no fever or chills  ENT: no nasal congestion or sore throat  Respiratory: no cough or shortness of breath  Cardiovascular: negative for palpitations  Gastrointestinal: no nausea or vomiting, tolerating diet  Genitourinary: no hematuria or dysuria  Integument/Breast: no rash or pruritis    OBJECTIVE:     PHYSICAL EXAM:  Height 5' 2" (1.575 m), weight 121 kg (266 lb 12.1 oz).   General Appearance: WDWN, NAD  Gait: Antalgic  Neuro/Psych: Mood & affect appropriate  Lungs: Respirations equal and unlabored.   CV: 2+ bilateral upper and lower extremity pulses.   Skin: Intact throughout LE  Extremities: No LE edema    Right Knee Exam  Range of Motion:0-120 active   Effusion:none  Condition of skin:intact and well healed incision  Location of tenderness:None   Strength:4 of 5 quadriceps strength and 4 of 5 hamstring strength  Stability:stable to testing    Left Knee Exam  Range of Motion:5-110 active "   Effusion:none  Condition of skin:intact  Location of tenderness: medial joint line   Strength:4 of 5 quadriceps strength and 4 of 5 hamstring strength  Stability:stable to testing    Alignment: Significiant varus    Right Hip Examination: no pain with PROM     Left Hip Examination: no pain with PROM     RADIOGRAPHS: AP, lateral and merchant knee x-rays ordered and images reviewed today by me reveal total knee replacement on right with no change from previous xray. There are severe degenerative changes in left knee. No fracture. Hip x-rays obtained revealing no fracture.    ASSESSMENT/PLAN:   Fall 2 months ago  Pain in legs, improving  Severe OA left knee  - Continue cane  - TKA when ready  - F/u prn in the interim.

## 2018-04-19 DIAGNOSIS — M54.50 LUMBAR SPINE PAIN: Primary | ICD-10-CM

## 2018-05-01 DIAGNOSIS — M54.2 NECK PAIN: Primary | ICD-10-CM

## 2018-05-02 ENCOUNTER — HOSPITAL ENCOUNTER (OUTPATIENT)
Dept: RADIOLOGY | Facility: HOSPITAL | Age: 63
Discharge: HOME | End: 2018-05-02
Attending: PHYSICIAN ASSISTANT
Payer: MEDICARE

## 2018-05-02 DIAGNOSIS — M54.2 NECK PAIN: ICD-10-CM

## 2018-05-02 DIAGNOSIS — M54.50 LUMBAR SPINE PAIN: ICD-10-CM

## 2018-05-02 PROCEDURE — 72050 X-RAY EXAM NECK SPINE 4/5VWS: CPT | Mod: TC

## 2018-05-02 PROCEDURE — 72100 X-RAY EXAM L-S SPINE 2/3 VWS: CPT | Mod: TC

## 2018-05-02 PROCEDURE — 72050 X-RAY EXAM NECK SPINE 4/5VWS: CPT | Mod: 26,,, | Performed by: RADIOLOGY

## 2018-05-02 PROCEDURE — 72100 X-RAY EXAM L-S SPINE 2/3 VWS: CPT | Mod: 26,,, | Performed by: RADIOLOGY

## 2018-06-19 ENCOUNTER — LAB VISIT (OUTPATIENT)
Dept: LAB | Facility: HOSPITAL | Age: 63
End: 2018-06-19
Attending: INTERNAL MEDICINE
Payer: MEDICARE

## 2018-06-19 ENCOUNTER — OFFICE VISIT (OUTPATIENT)
Dept: INTERNAL MEDICINE | Facility: CLINIC | Age: 63
End: 2018-06-19
Payer: MEDICARE

## 2018-06-19 VITALS
HEIGHT: 62 IN | SYSTOLIC BLOOD PRESSURE: 136 MMHG | WEIGHT: 255.69 LBS | BODY MASS INDEX: 47.05 KG/M2 | HEART RATE: 93 BPM | DIASTOLIC BLOOD PRESSURE: 86 MMHG

## 2018-06-19 DIAGNOSIS — E11.9 TYPE 2 DIABETES MELLITUS WITHOUT COMPLICATION, WITHOUT LONG-TERM CURRENT USE OF INSULIN: Primary | ICD-10-CM

## 2018-06-19 DIAGNOSIS — Z12.31 ENCOUNTER FOR SCREENING MAMMOGRAM FOR BREAST CANCER: ICD-10-CM

## 2018-06-19 DIAGNOSIS — E78.5 HYPERLIPIDEMIA, UNSPECIFIED HYPERLIPIDEMIA TYPE: ICD-10-CM

## 2018-06-19 DIAGNOSIS — I11.0 HYPERTENSIVE HEART DISEASE WITH HEART FAILURE: ICD-10-CM

## 2018-06-19 DIAGNOSIS — E11.9 TYPE 2 DIABETES MELLITUS WITHOUT COMPLICATION, WITHOUT LONG-TERM CURRENT USE OF INSULIN: ICD-10-CM

## 2018-06-19 DIAGNOSIS — I70.0 THORACIC AORTA ATHEROSCLEROSIS: ICD-10-CM

## 2018-06-19 LAB
ALBUMIN SERPL BCP-MCNC: 4.4 G/DL
ALP SERPL-CCNC: 42 U/L
ALT SERPL W/O P-5'-P-CCNC: 24 U/L
ANION GAP SERPL CALC-SCNC: 6 MMOL/L
AST SERPL-CCNC: 22 U/L
BASOPHILS # BLD AUTO: 0.04 K/UL
BASOPHILS NFR BLD: 0.9 %
BILIRUB SERPL-MCNC: 0.4 MG/DL
BUN SERPL-MCNC: 11 MG/DL
CALCIUM SERPL-MCNC: 10.3 MG/DL
CHLORIDE SERPL-SCNC: 102 MMOL/L
CHOLEST SERPL-MCNC: 214 MG/DL
CHOLEST/HDLC SERPL: 2.9 {RATIO}
CO2 SERPL-SCNC: 29 MMOL/L
CREAT SERPL-MCNC: 0.9 MG/DL
DIFFERENTIAL METHOD: ABNORMAL
EOSINOPHIL # BLD AUTO: 0.1 K/UL
EOSINOPHIL NFR BLD: 1.1 %
ERYTHROCYTE [DISTWIDTH] IN BLOOD BY AUTOMATED COUNT: 14.5 %
EST. GFR  (AFRICAN AMERICAN): >60 ML/MIN/1.73 M^2
EST. GFR  (NON AFRICAN AMERICAN): >60 ML/MIN/1.73 M^2
ESTIMATED AVG GLUCOSE: 194 MG/DL
GLUCOSE SERPL-MCNC: 150 MG/DL
HBA1C MFR BLD HPLC: 8.4 %
HCT VFR BLD AUTO: 38.7 %
HDLC SERPL-MCNC: 75 MG/DL
HDLC SERPL: 35 %
HGB BLD-MCNC: 12.2 G/DL
LDLC SERPL CALC-MCNC: 122 MG/DL
LYMPHOCYTES # BLD AUTO: 2.6 K/UL
LYMPHOCYTES NFR BLD: 57.2 %
MCH RBC QN AUTO: 27.2 PG
MCHC RBC AUTO-ENTMCNC: 31.5 G/DL
MCV RBC AUTO: 86 FL
MONOCYTES # BLD AUTO: 0.3 K/UL
MONOCYTES NFR BLD: 5.9 %
NEUTROPHILS # BLD AUTO: 1.6 K/UL
NEUTROPHILS NFR BLD: 34.9 %
NONHDLC SERPL-MCNC: 139 MG/DL
PLATELET # BLD AUTO: 279 K/UL
PMV BLD AUTO: 9.5 FL
POTASSIUM SERPL-SCNC: 4.4 MMOL/L
PROT SERPL-MCNC: 8.3 G/DL
RBC # BLD AUTO: 4.49 M/UL
SODIUM SERPL-SCNC: 137 MMOL/L
TRIGL SERPL-MCNC: 85 MG/DL
WBC # BLD AUTO: 4.58 K/UL

## 2018-06-19 PROCEDURE — 85025 COMPLETE CBC W/AUTO DIFF WBC: CPT

## 2018-06-19 PROCEDURE — 99214 OFFICE O/P EST MOD 30 MIN: CPT | Mod: S$PBB,,, | Performed by: INTERNAL MEDICINE

## 2018-06-19 PROCEDURE — 99999 PR PBB SHADOW E&M-EST. PATIENT-LVL III: CPT | Mod: PBBFAC,,, | Performed by: INTERNAL MEDICINE

## 2018-06-19 PROCEDURE — 36415 COLL VENOUS BLD VENIPUNCTURE: CPT

## 2018-06-19 PROCEDURE — 99213 OFFICE O/P EST LOW 20 MIN: CPT | Mod: PBBFAC | Performed by: INTERNAL MEDICINE

## 2018-06-19 PROCEDURE — 80053 COMPREHEN METABOLIC PANEL: CPT

## 2018-06-19 PROCEDURE — 80061 LIPID PANEL: CPT

## 2018-06-19 PROCEDURE — 83036 HEMOGLOBIN GLYCOSYLATED A1C: CPT

## 2018-06-19 RX ORDER — CARVEDILOL 12.5 MG/1
12.5 TABLET ORAL
COMMUNITY
Start: 2018-06-11 | End: 2018-06-19 | Stop reason: SDDI

## 2018-06-19 RX ORDER — LOSARTAN POTASSIUM 50 MG/1
TABLET ORAL
COMMUNITY
Start: 2018-04-23 | End: 2018-06-19 | Stop reason: SDDI

## 2018-06-19 RX ORDER — AMLODIPINE BESYLATE 5 MG/1
5 TABLET ORAL DAILY
Qty: 30 TABLET | Refills: 5 | Status: SHIPPED | OUTPATIENT
Start: 2018-06-19 | End: 2019-06-10 | Stop reason: SDUPTHER

## 2018-06-19 RX ORDER — LOSARTAN POTASSIUM 25 MG/1
25 TABLET ORAL DAILY
Qty: 30 TABLET | Refills: 5 | Status: SHIPPED | OUTPATIENT
Start: 2018-06-19 | End: 2019-04-09 | Stop reason: SDDI

## 2018-06-19 RX ORDER — DOXYCYCLINE HYCLATE 100 MG
TABLET ORAL
COMMUNITY
Start: 2018-05-30 | End: 2019-04-09

## 2018-06-20 ENCOUNTER — TELEPHONE (OUTPATIENT)
Dept: INTERNAL MEDICINE | Facility: CLINIC | Age: 63
End: 2018-06-20

## 2018-06-20 DIAGNOSIS — E78.5 HYPERLIPIDEMIA, UNSPECIFIED HYPERLIPIDEMIA TYPE: ICD-10-CM

## 2018-06-20 DIAGNOSIS — E11.9 TYPE 2 DIABETES MELLITUS WITHOUT COMPLICATION, WITHOUT LONG-TERM CURRENT USE OF INSULIN: ICD-10-CM

## 2018-06-20 RX ORDER — GLIMEPIRIDE 4 MG/1
4 TABLET ORAL
Qty: 90 TABLET | Refills: 1 | Status: SHIPPED | OUTPATIENT
Start: 2018-06-20 | End: 2019-03-08 | Stop reason: SDUPTHER

## 2018-06-20 RX ORDER — ATORVASTATIN CALCIUM 20 MG/1
20 TABLET, FILM COATED ORAL DAILY
Qty: 90 TABLET | Refills: 1 | Status: SHIPPED | OUTPATIENT
Start: 2018-06-20 | End: 2020-01-14 | Stop reason: ALTCHOICE

## 2018-06-20 NOTE — TELEPHONE ENCOUNTER
Diabetes and Cholesterol are not controlled. Blood sugar is averaging near 200 and Cholesterol is 20 points too high.   Increase Glimepiride from 2 to 4mg daily, and increase Atorvastatin from 10 to 20mg daily - new orders sent to [facility] (she can double up on current meds until that bottle runs out).

## 2018-06-24 PROBLEM — I70.0 THORACIC AORTA ATHEROSCLEROSIS: Status: ACTIVE | Noted: 2018-06-24
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
## 2018-06-26 NOTE — TELEPHONE ENCOUNTER
Pt informed of results   She is advised plan of care   Pt refuse to change current med regimen she will do diet and exercise for 3 more mths

## 2018-07-16 ENCOUNTER — TELEPHONE (OUTPATIENT)
Dept: INTERNAL MEDICINE | Facility: CLINIC | Age: 63
End: 2018-07-16

## 2018-07-16 NOTE — TELEPHONE ENCOUNTER
----- Message from Nelida Cloud sent at 7/16/2018  9:33 AM CDT -----  Contact: Pt   Pt was calling to get orders put into the system for a stress test.    Pt would like a call back at [phone].      Thank You

## 2018-08-23 ENCOUNTER — OFFICE VISIT (OUTPATIENT)
Dept: OTOLARYNGOLOGY | Facility: CLINIC | Age: 63
End: 2018-08-23
Payer: MEDICARE

## 2018-08-23 VITALS
HEART RATE: 74 BPM | WEIGHT: 259.5 LBS | BODY MASS INDEX: 47.46 KG/M2 | DIASTOLIC BLOOD PRESSURE: 76 MMHG | SYSTOLIC BLOOD PRESSURE: 128 MMHG

## 2018-08-23 DIAGNOSIS — R13.10 DYSPHAGIA, UNSPECIFIED TYPE: Primary | ICD-10-CM

## 2018-08-23 PROCEDURE — 31575 DIAGNOSTIC LARYNGOSCOPY: CPT | Mod: S$PBB,,, | Performed by: OTOLARYNGOLOGY

## 2018-08-23 PROCEDURE — 99213 OFFICE O/P EST LOW 20 MIN: CPT | Mod: 25,S$PBB,, | Performed by: OTOLARYNGOLOGY

## 2018-08-23 PROCEDURE — 31575 DIAGNOSTIC LARYNGOSCOPY: CPT | Mod: PBBFAC | Performed by: OTOLARYNGOLOGY

## 2018-08-23 PROCEDURE — 99213 OFFICE O/P EST LOW 20 MIN: CPT | Mod: PBBFAC,25 | Performed by: OTOLARYNGOLOGY

## 2018-08-23 PROCEDURE — 99999 PR PBB SHADOW E&M-EST. PATIENT-LVL III: CPT | Mod: PBBFAC,,, | Performed by: OTOLARYNGOLOGY

## 2018-08-23 RX ORDER — NAPROXEN 500 MG/1
TABLET ORAL
COMMUNITY
Start: 2018-08-17 | End: 2020-02-05

## 2018-08-26 PROBLEM — R13.10 DYSPHAGIA: Status: ACTIVE | Noted: 2018-08-26

## 2018-08-26 NOTE — PROGRESS NOTES
"Chief Complaint   Patient presents with    Dysphagia       HPI   63 y.o. female presents for evaluation of dysphagia to solids.  She has a history of surgery for "growths" on the epiglottis.  She denies bren throat pain or SOB.  She denies dysphagia to liquids.  She reports that solid foods tend to get "stuck" in her throat.    Review of Systems   Constitutional: Negative for fatigue and unexpected weight change.   HENT: Per HPI.  Eyes: Negative for visual disturbance.   Respiratory: Negative for shortness of breath, hemoptysis   Cardiovascular: Negative for chest pain and palpitations.   Musculoskeletal: Negative for decreased ROM, back pain.   Skin: Negative for rash, sunburn, itching.   Neurological: Negative for dizziness and seizures.   Hematological: Negative for adenopathy. Does not bruise/bleed easily.   Endocrine: Negative for rapid weight loss/weight gain, heat/cold intolerance.     Past Medical History   Patient Active Problem List   Diagnosis    Type 2 diabetes mellitus with diabetic cataract, without long-term current use of insulin    Hx of colonic polyps    Osteoarthritis of more than one site    Allergic rhinitis, seasonal    Depression with anxiety    GERD (gastroesophageal reflux disease)    DDD (degenerative disc disease), cervical    Morbid obesity    Dysphonia    Chronic, continuous use of opioids    Patient is Jewish    Constipation    Tattoos    Edema    Primary osteoarthritis of right knee    Status post total right knee replacement 5/31/2017    Chronic pain of right knee    Range of motion deficit    Abnormality of gait    Primary osteoarthritis of left knee    Insufficiency of tear film of both eyes    Thoracic aorta atherosclerosis           Past Surgical History   Past Surgical History:   Procedure Laterality Date    ADENOIDECTOMY      COLONOSCOPY      epiglottis      4 times    HYSTERECTOMY      Partial, fibroids    KNEE SURGERY      NECK SURGERY  " "    SPINE SURGERY      TONSILLECTOMY      TOTAL KNEE ARTHROPLASTY Right 2017         Family History   Family History   Problem Relation Age of Onset    Cancer Father         throat     Hypertension Father     Heart disease Father     Cancer Brother     Heart failure Brother 62    Hypertension Mother     Arthritis Mother     Diabetes Paternal Aunt     Stroke Paternal Aunt     Breast cancer Neg Hx     Colon cancer Neg Hx     Ovarian cancer Neg Hx            Social History   .  Social History     Socioeconomic History    Marital status:      Spouse name: Not on file    Number of children: Not on file    Years of education: Not on file    Highest education level: Not on file   Social Needs    Financial resource strain: Not on file    Food insecurity - worry: Not on file    Food insecurity - inability: Not on file    Transportation needs - medical: Not on file    Transportation needs - non-medical: Not on file   Occupational History     Employer: DISABLED    Tobacco Use    Smoking status: Former Smoker     Packs/day: 1.00     Years: 20.00     Pack years: 20.00     Types: Cigarettes     Last attempt to quit: 2007     Years since quittin.5    Smokeless tobacco: Never Used   Substance and Sexual Activity    Alcohol use: Yes     Comment: "not enough to talk about "    Drug use: No    Sexual activity: No   Other Topics Concern    Not on file   Social History Narrative    Disabled, 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         Allergies   Review of patient's allergies indicates:   Allergen Reactions    Iodinated contrast- oral and iv dye Hives and Rash     As per history has problem if has IV extravasation    Lisinopril Shortness Of Breath           Physical Exam     Vitals:    18 1409   BP: 128/76   Pulse: 74         Body mass index is 47.46 kg/m².      General: AOx3, NAD   Respiratory:  Symmetric chest rise, normal effort  Nose: No gross nasal septal deviation. " Inferior Turbinates WNL bilaterally. No septal perforation. No masses/lesions.   Oral Cavity:  Oral Tongue mobile, no lesions noted. Hard Palate WNL. No buccal or FOM lesions.  Oropharynx:  No masses/lesions of the posterior pharyngeal wall. Tonsillar fossa without lesions. Soft palate without masses. Midline uvula.   Neck: No scars.  No cervical lymphadenopathy, thyromegaly or thyroid nodules.  Normal range of motion.    Face: House Brackmann I bilaterally.     Flex Naso Mora Hypo Procedures #2    Procedure:  Diagnostic flexible nasopharyngoscopy, laryngoscopy and hypopharyngoscopy:    Routine preparation with local atomizer with 1% neosynephrine/pontocaine with customary flexible endoscope.    Nasopharynx:  No lesions.   Mucosa:  No lesions.   Adenoids:  Present.  Posterior Choanae:  Patent.  Eustachian Tubes:  Patent.  Posterior pharynx:  No lesions.  Larynx/hypopharynx:   Epiglottis:  Suprahyoid epiglottis attenuated status post surgery.   AE Folds:  No lesions.   Vocal cords:  No polyps, nodules, ulcers or lesions.   Mobility:  Equal and normal bilateral.   Hypopharynx:  No lesions.   Piriform sinus:  No pooling, no lesions.   Post Cricoid:  No erythema, no edema.      Assessment/Plan  Problem List Items Addressed This Visit        GI    Dysphagia - Primary     No lesions noted on exam.  Etiology uncertain.  Esophagram and MBSS ordered.  Will contact her with the results of these studies.          Relevant Orders    SLP video swallow    Fl Modified Barium Swallow Speech    FL Esophagram Complete

## 2018-08-26 NOTE — ASSESSMENT & PLAN NOTE
No lesions noted on exam.  Etiology uncertain.  Esophagram and MBSS ordered.  Will contact her with the results of these studies.

## 2018-09-06 ENCOUNTER — TELEPHONE (OUTPATIENT)
Dept: SPEECH THERAPY | Facility: HOSPITAL | Age: 63
End: 2018-09-06

## 2018-09-07 ENCOUNTER — TELEPHONE (OUTPATIENT)
Dept: ORTHOPEDICS | Facility: CLINIC | Age: 63
End: 2018-09-07

## 2018-09-07 NOTE — TELEPHONE ENCOUNTER
Spoke with MsNadjaSonny and  Explained to her that  was out today and I sent a message to her so that she can discuss setting up an appt for sx. I then informed her per Sharda that it was against protocol to send a pt pain meds a year after they have had sx. I explained to her that she would need to f/u with her PCP. MsNadjaSonny verbalized understanding.

## 2018-09-07 NOTE — TELEPHONE ENCOUNTER
Attempted to contact  once more regarding her pain meds and pt did not answer. Message was left for her to give us a callback at the office.

## 2018-09-07 NOTE — TELEPHONE ENCOUNTER
Attempted to contact  to inform her that  was out and I would send message out to her regarding scheduling her sx. Also that message was sent to Sharda Sandhu for medication refill.

## 2018-09-07 NOTE — TELEPHONE ENCOUNTER
----- Message from Ivette Alonzo sent at 9/7/2018  2:29 PM CDT -----  Contact: Pt  Pt says she need to get some pain pills and need to get her knee surgery scheduled says she is in real pain    Pt can be reached at [phone] thanks

## 2018-09-07 NOTE — TELEPHONE ENCOUNTER
----- Message from Sharda Sandhu PA-C sent at 9/7/2018 12:30 PM CDT -----  Contact: self   That hydrocodone was from after her knee replacement last year. I can't refill it because of our policy. She can take tylenol or talk to her PCP.     ----- Message -----  From: Shantell Wolff MA  Sent: 9/7/2018   9:57 AM  To: Sharda Sandhu PA-C     Can you refill this? I will send message to Haydee regarding scheduling her for Sx.   ----- Message -----  From: Brandi Cazares  Sent: 9/7/2018   9:20 AM  To: [facility]    Pt is requesting a call back regarding schedule left knee surgery. Pt also would like a refill for hydrocodone-acetaminophen 10-325mg (NORCO)  mg Tab. Pt can be reached at [phone].

## 2018-09-07 NOTE — TELEPHONE ENCOUNTER
----- Message from Smartpayjoaquin Cazares sent at 9/7/2018  9:20 AM CDT -----  Contact: self   Pt is requesting a call back regarding schedule left knee surgery. Pt also would like a refill for hydrocodone-acetaminophen 10-325mg (NORCO)  mg Tab. Pt can be reached at [phone].

## 2018-09-08 DIAGNOSIS — E78.5 HYPERLIPIDEMIA, UNSPECIFIED HYPERLIPIDEMIA TYPE: ICD-10-CM

## 2018-09-08 DIAGNOSIS — E11.9 TYPE 2 DIABETES MELLITUS WITHOUT COMPLICATION, WITHOUT LONG-TERM CURRENT USE OF INSULIN: ICD-10-CM

## 2018-09-08 RX ORDER — GLIMEPIRIDE 4 MG/1
TABLET ORAL
Qty: 90 TABLET | Refills: 1 | OUTPATIENT
Start: 2018-09-08

## 2018-09-08 RX ORDER — ATORVASTATIN CALCIUM 20 MG/1
TABLET, FILM COATED ORAL
Qty: 90 TABLET | Refills: 1 | OUTPATIENT
Start: 2018-09-08

## 2018-09-11 ENCOUNTER — TELEPHONE (OUTPATIENT)
Dept: ORTHOPEDICS | Facility: CLINIC | Age: 63
End: 2018-09-11

## 2018-09-11 NOTE — TELEPHONE ENCOUNTER
We spoke  informed that  Dr [facility] wants her BMI below 40 to safely have surgery    She has lost  10 lbs  Not enough , she asked if she could get an appt to have a knee injection   The first available was Tisha Estrada tomorrow   She took it ( told it was up the the DR if he would inject her knee -)    She voiced understanding

## 2018-10-15 ENCOUNTER — HOSPITAL ENCOUNTER (OUTPATIENT)
Dept: RADIOLOGY | Facility: HOSPITAL | Age: 63
Discharge: HOME | End: 2018-10-15
Attending: OTOLARYNGOLOGY
Payer: MEDICARE

## 2018-10-15 ENCOUNTER — TELEPHONE (OUTPATIENT)
Dept: RADIOLOGY | Facility: HOSPITAL | Age: 63
End: 2018-10-15

## 2018-10-15 ENCOUNTER — CLINICAL SUPPORT (OUTPATIENT)
Dept: SPEECH THERAPY | Facility: HOSPITAL | Age: 63
End: 2018-10-15
Attending: OTOLARYNGOLOGY
Payer: MEDICARE

## 2018-10-15 DIAGNOSIS — R13.10 DYSPHAGIA, UNSPECIFIED TYPE: ICD-10-CM

## 2018-10-15 DIAGNOSIS — R09.A2 GLOBUS SENSATION: ICD-10-CM

## 2018-10-15 DIAGNOSIS — R13.12 DYSPHAGIA, OROPHARYNGEAL: Primary | ICD-10-CM

## 2018-10-15 PROCEDURE — 74230 X-RAY XM SWLNG FUNCJ C+: CPT | Mod: 26,,, | Performed by: RADIOLOGY

## 2018-10-15 PROCEDURE — 74220 X-RAY XM ESOPHAGUS 1CNTRST: CPT | Mod: TC

## 2018-10-15 PROCEDURE — G8997 SWALLOW GOAL STATUS: HCPCS | Mod: CI | Performed by: SPEECH-LANGUAGE PATHOLOGIST

## 2018-10-15 PROCEDURE — 74230 X-RAY XM SWLNG FUNCJ C+: CPT | Mod: TC

## 2018-10-15 PROCEDURE — G8998 SWALLOW D/C STATUS: HCPCS | Mod: CI | Performed by: SPEECH-LANGUAGE PATHOLOGIST

## 2018-10-15 PROCEDURE — 92611 MOTION FLUOROSCOPY/SWALLOW: CPT | Mod: GN | Performed by: SPEECH-LANGUAGE PATHOLOGIST

## 2018-10-15 PROCEDURE — G8996 SWALLOW CURRENT STATUS: HCPCS | Mod: CI | Performed by: SPEECH-LANGUAGE PATHOLOGIST

## 2018-10-16 ENCOUNTER — TELEPHONE (OUTPATIENT)
Dept: OTOLARYNGOLOGY | Facility: CLINIC | Age: 63
End: 2018-10-16

## 2018-10-16 NOTE — TELEPHONE ENCOUNTER
I spoke to MsNadja Sonny regarding her swallow tests.  She is to resume Nexium.  I asked her to search the internet for a lists of reflux-triggering foods and to avoid those.

## 2018-10-20 NOTE — PLAN OF CARE
IMPRESSIONS:   This 63 y.o. woman appears to present with  1.  Oral and pharyngoesophageal phases of swallowing within normal limits with only trace flash penetration of thin liquids during continuous swallowing noted.  2.  History multiple epiglottic surgeries as young adult and history ACDF 2005 with persisting globus sensation since that surgery.  3.  History GERD.    Swallowing  Current status:  FCM:  LEVEL 6 (1-19% impaired)  - CI  Projected status:  FCM:   LEVEL 6 (1-19% impaired)  - CI  Discharge status:  FCM:   LEVEL 6 (1-19% impaired)  -  CI       RECOMMENDATIONS/PLAN OF CARE:   It is felt that Ms. Dennis would benefit from  1.  Continuation of her current regular consistency diet with thin liquids using the following strategies and common aspiration precautions, including, but not limited to   A.  Appropriate upright seating for all eating and drinking.   B.  Kang sips and bites, thoroughly chewed.   C.  Slow rate of eating.   D.  Monitoring for any signs/symptoms of aspiration (such as wet/gurgly voice that does not clear with coughing, inability to make any voice sounds, any persistent coughing with oral intake, otherwise unexplained fever, unexplained increased or new difficulty or discomfort breathing, unexplained increase in sleepiness/lethargy/significant fatigue, unexplained increase or new onset confusion or change in cognitive functioning, or any other unexplained change in health or well-being that could be related to swallowing).  2.  Observe common reflux precautions.  3.  Follow up with Dr. Arriaza or other specialists as directed.  4.  Repeat MBSS as needed.

## 2018-10-20 NOTE — PROGRESS NOTES
"MODIFIED BARIUM SWALLOW STUDY    REASON FOR REFERRAL:  Isabel Dennis, age 63, was referred by Dr. Tomas Arriaza, head and neck surgical oncologist,  for a Modified Barium Swallow Study to rule out aspiration, assess her overall swallowing function, and determine safest consistencies for oral intake.    Ms. Dennis reported a history of ACDF August 2005 with onset of globus sensation, indicated at the level of the larynx, and sometimes difficulty initiating a swallow shortly thereafter. She noted that pattern particularly at night and particularly in her home, but stated that she does not have swallowing problems when sleeping elsewhere than her home (e.g., as when on vacation or visiting family).  She also reported a history of multiple surgeries to her epiglottis for "growths" with [facility] when in her 20's and 30's.   She reported a history of GERD.    An esophagram is scheduled immediately following today's MBSS.      MEDICAL HISTORY:  Past Medical History:   Diagnosis Date    Allergy     Anxiety     Arthritis     DDD (degenerative disc disease), cervical     Depression     Diabetes mellitus     Diabetes mellitus, type 2     Diverticulosis     GERD (gastroesophageal reflux disease)     Hx of colonic polyps     Lung disease     Other and unspecified hyperlipidemia     Post-traumatic osteoarthritis of both knees         SURGICAL HISTORY:  Past Surgical History:   Procedure Laterality Date    ADENOIDECTOMY      COLONOSCOPY      COLONOSCOPY N/A 4/22/2013    [facility] ([facility])    COLONOSCOPY N/A 4/18/2013    [facility] by [facility] at [facility] (4TH FLR)    epiglottis      4 times    HYSTERECTOMY      Partial, fibroids    KNEE SURGERY      MANOMETRY-ANORECTAL N/A 1/11/2016    [facility] (4TH FLR)    NECK SURGERY      REPLACEMENT-KNEE-TOTAL Right 5/31/2017    [facility] [address] [place] [address]    SPINE SURGERY      TONSILLECTOMY      TOTAL KNEE ARTHROPLASTY Right 05/2017       SWALLOWING HISTORY:  As above.  Takes a regular diet with thin liquids; takes pill medications with liquids.    FAMILY HISTORY:  Family History   Problem Relation Age of Onset    Cancer Father         throat     Hypertension Father     Heart disease Father     Cancer Brother     Heart failure Brother 62    Hypertension Mother     Arthritis Mother     Diabetes Paternal Aunt     Stroke Paternal Aunt     Breast cancer Neg Hx     Colon cancer Neg Hx     Ovarian cancer Neg Hx         BEHAVIOR:  Isabel Dennis was a very pleasant woman who had normal affect and social interaction.  She was able to fully cooperate during the study.  Results of today's assessment were considered indicative of her current levels of swallowing functioning.      HEARING:  Subjectively, within normal limits.     ORAL PERIPHERAL:   Informal examination of the oral mechanism revealed structures and functioning within normal limits for swallowing and speech purposes.    Voice quality was within normal limits with no wet quality before, during, or after the study.    TEST FINDINGS:   Ms. Dennis was seen in Radiology with the Radiologist for a Modified Barium Swallow Study.  She was seated on a stool for a left lateral videofluoroscopic view.      Consistencies assessed using radiopaque barium contrast:  thin (3- and 5-mL boluses via spoon and single and continuous swallows via open cup),  thin puree (1-teaspoon bolus) via spoon,   thick puree (1-teaspoon bolus) via spoon,   solid (half cracker boluses) by Ms. Dennis's hand, and   12.5 mm barium tablet with water.    Strategies:  None.    Phases:  Oral:  Ms. Dennis was able to obtain liquid and strip utensils well with no loss of material from the oral cavity.  she moved boluses through the oral cavity with appropriate transit time.  There was no pooling of liquids in  the mouth.  Swallow reflex was triggered within normal limits.    Pharyngeal:  Boluses moved through the pharyngeal phase with no laryngeal penetration and no aspiration and no nasal regurgitation with the exception of trace flash laryngeal penetration during continuous swallows of thin liquids.    - Timely initiation  - Adequate soft palate elevation  - Adequate laryngeal elevation and anterior hyoid excursion  - Adequate tongue base retraction  - Complete epiglottic inversion  - Incomplete laryngeal vestibular closure as evidenced by flash penetration with continuous swallows of thin liquids.  - Adequate pharyngeal stripping wave  - Adequate PE segment opening.  -  No pharyngeal residue    Cervical Esophageal:  Boluses entered the upper esophagus within normal limits.  No obstruction was noted.  There was a momentary pause in progression of the solid bolus as it passed the ACDF plate, but no obstruction with it or the tablet.    Rosenbeck 8-point Penetration-Aspiration Scale:  Best:  1 - Material does not enter airway. -- single cup sips of thin liquid, all solids, barium tablet.  Worst:    2 - Material enters the airway, remains above the vocal folds, and is ejected from the airway. -- Flash penetration with thin liquids during continuous cup sip swallows.    Please see Radiology report re: esophagram for additional information re: overall swallowing functioning.    IMPRESSIONS:   This 63 y.o. woman appears to present with  1.  Oral and pharyngoesophageal phases of swallowing within normal limits with only trace flash penetration of thin liquids during continuous swallowing noted.  2.  History multiple epiglottic surgeries as young adult and history ACDF 2005 with persisting globus sensation since that surgery.  3.  History GERD.    Swallowing  Current status:  FCM:  LEVEL 6 (1-19% impaired)  - CI  Projected status:  FCM:   LEVEL 6 (1-19% impaired)  - CI  Discharge status:  FCM:   LEVEL 6 (1-19%  impaired)  -  CI       RECOMMENDATIONS/PLAN OF CARE:   It is felt that Ms. Dennis would benefit from  1.  Continuation of her current regular consistency diet with thin liquids using the following strategies and common aspiration precautions, including, but not limited to   A.  Appropriate upright seating for all eating and drinking.   B.  Kang sips and bites, thoroughly chewed.   C.  Slow rate of eating.   D.  Monitoring for any signs/symptoms of aspiration (such as wet/gurgly voice that does not clear with coughing, inability to make any voice sounds, any persistent coughing with oral intake, otherwise unexplained fever, unexplained increased or new difficulty or discomfort breathing, unexplained increase in sleepiness/lethargy/significant fatigue, unexplained increase or new onset confusion or change in cognitive functioning, or any other unexplained change in health or well-being that could be related to swallowing).  2.  Observe common reflux precautions.  3.  Follow up with Dr. Arriaza or other specialists as directed.  4.  Repeat MBSS as needed.

## 2019-01-25 ENCOUNTER — TELEPHONE (OUTPATIENT)
Dept: INTERNAL MEDICINE | Facility: CLINIC | Age: 64
End: 2019-01-25

## 2019-01-25 NOTE — TELEPHONE ENCOUNTER
----- Message from Omero Coyne sent at 1/25/2019 10:24 AM CST -----  Contact: self/[phone]  Pt is calling to speak with someone in the office in regards to getting lab work orders for her upcoming follow up. Please advise.        Thanks

## 2019-03-08 DIAGNOSIS — E11.9 TYPE 2 DIABETES MELLITUS WITHOUT COMPLICATION, WITHOUT LONG-TERM CURRENT USE OF INSULIN: ICD-10-CM

## 2019-03-08 RX ORDER — GLIMEPIRIDE 4 MG/1
4 TABLET ORAL
Qty: 90 TABLET | Refills: 0 | Status: SHIPPED | OUTPATIENT
Start: 2019-03-08 | End: 2019-05-07 | Stop reason: SDUPTHER

## 2019-03-08 NOTE — TELEPHONE ENCOUNTER
----- Message from Carol Rodriguez sent at 3/8/2019  4:01 PM CST -----  Contact: [phone]  RX request - refill or new RX.  Is this a refill or new RX:  Refill   RX name and strength: glimepiride (AMARYL) 4 MG tablet    Local pharmacy or mail order pharmacy:  [facility] [phone]    Comments:  Please advise, thanks

## 2019-03-15 ENCOUNTER — TELEPHONE (OUTPATIENT)
Dept: ORTHOPEDICS | Facility: CLINIC | Age: 64
End: 2019-03-15

## 2019-03-15 NOTE — TELEPHONE ENCOUNTER
Spoke with pt, notified her that Silvina was not here today but I would send her a message letting her know she is ready to schedule sx anytime after 4/4. Pt verbalized understanding and had no further questions.    ----- Message from Laura Gomez sent at 3/15/2019  9:28 AM CDT -----  Contact: self  Needs Advice    Reason for call: Pt ask for a call to schedule her knee surgery        Communication Preference: Phone       Additional Information: n/a

## 2019-03-18 ENCOUNTER — TELEPHONE (OUTPATIENT)
Dept: ORTHOPEDICS | Facility: CLINIC | Age: 64
End: 2019-03-18

## 2019-03-18 NOTE — TELEPHONE ENCOUNTER
----- Message from Sarah Hernandez MA sent at 3/15/2019 10:14 AM CDT -----  Contact: self  Wants to schedule sx anytime after 4/4.    ----- Message -----  From: Laura Gomez  Sent: 3/15/2019   9:28 AM  To: [facility]    Needs Advice    Reason for call: Pt ask for a call to schedule her knee surgery        Communication Preference: Phone       Additional Information: n/a

## 2019-03-26 ENCOUNTER — OFFICE VISIT (OUTPATIENT)
Dept: SURGERY | Facility: CLINIC | Age: 64
End: 2019-03-26
Payer: MEDICARE

## 2019-03-26 VITALS
WEIGHT: 256 LBS | HEART RATE: 95 BPM | DIASTOLIC BLOOD PRESSURE: 84 MMHG | BODY MASS INDEX: 46.82 KG/M2 | SYSTOLIC BLOOD PRESSURE: 148 MMHG

## 2019-03-26 DIAGNOSIS — R22.30 MASS OF SHOULDER REGION: ICD-10-CM

## 2019-03-26 DIAGNOSIS — E11.36 TYPE 2 DIABETES MELLITUS WITH DIABETIC CATARACT, WITHOUT LONG-TERM CURRENT USE OF INSULIN: ICD-10-CM

## 2019-03-26 DIAGNOSIS — E66.01 MORBID OBESITY: Primary | ICD-10-CM

## 2019-03-26 PROCEDURE — 99999 PR PBB SHADOW E&M-EST. PATIENT-LVL III: CPT | Mod: PBBFAC,,, | Performed by: SURGERY

## 2019-03-26 PROCEDURE — 99204 OFFICE O/P NEW MOD 45 MIN: CPT | Mod: S$PBB,GC,, | Performed by: SURGERY

## 2019-03-26 PROCEDURE — 99204 PR OFFICE/OUTPT VISIT, NEW, LEVL IV, 45-59 MIN: ICD-10-PCS | Mod: S$PBB,GC,, | Performed by: SURGERY

## 2019-03-26 PROCEDURE — 99213 OFFICE O/P EST LOW 20 MIN: CPT | Mod: PBBFAC | Performed by: SURGERY

## 2019-03-26 PROCEDURE — 99999 PR PBB SHADOW E&M-EST. PATIENT-LVL III: ICD-10-PCS | Mod: PBBFAC,,, | Performed by: SURGERY

## 2019-03-26 RX ORDER — CARISOPRODOL 350 MG/1
350 TABLET ORAL
COMMUNITY
End: 2020-09-01 | Stop reason: ALTCHOICE

## 2019-03-26 RX ORDER — OXYMORPHONE HYDROCHLORIDE 10 MG/1
30 TABLET ORAL
COMMUNITY
End: 2020-01-14 | Stop reason: ALTCHOICE

## 2019-03-26 RX ORDER — GLIMEPIRIDE 2 MG/1
TABLET ORAL
COMMUNITY
Start: 2019-03-25 | End: 2019-05-07 | Stop reason: DRUGHIGH

## 2019-03-26 RX ORDER — AMITRIPTYLINE HYDROCHLORIDE 10 MG/1
TABLET, FILM COATED ORAL
COMMUNITY
End: 2019-04-09

## 2019-03-26 RX ORDER — OXYCODONE HYDROCHLORIDE 30 MG/1
TABLET, FILM COATED, EXTENDED RELEASE ORAL
COMMUNITY
Start: 2019-03-22 | End: 2020-02-05

## 2019-03-26 RX ORDER — FUROSEMIDE 20 MG/1
20 TABLET ORAL DAILY
COMMUNITY

## 2019-03-26 RX ORDER — METFORMIN HYDROCHLORIDE 1000 MG/1
TABLET ORAL 2 TIMES DAILY WITH MEALS
COMMUNITY
Start: 2019-03-15

## 2019-03-26 RX ORDER — HYDROGEN PEROXIDE 3 %
20 SOLUTION, NON-ORAL MISCELLANEOUS
COMMUNITY
End: 2020-01-14 | Stop reason: ALTCHOICE

## 2019-03-26 RX ORDER — DULOXETIN HYDROCHLORIDE 20 MG/1
CAPSULE, DELAYED RELEASE ORAL
COMMUNITY
Start: 2019-02-18 | End: 2020-01-14 | Stop reason: ALTCHOICE

## 2019-03-26 RX ORDER — ROSUVASTATIN CALCIUM 10 MG/1
10 TABLET, COATED ORAL
COMMUNITY
End: 2019-04-09 | Stop reason: SDDI

## 2019-03-26 NOTE — PROGRESS NOTES
History & Physical    SUBJECTIVE:     History of Present Illness:  Patient is a 64 y.o. female presents for evaluation of a right shoulder mass. Patient first noticed this 2-3 years ago. Believes that this is related to an instance where IV contrast infiltrated into her right arm 6-7 months prior to her first noticing this lump. Reports that it enlarged since that time. She denies associated pain, but reports decreased ROM. Otherwise no other issues. Denies previous similar masses.     Patient states that she was being evaluated for this a [facility] and previously underwent a CT scan and was subsequently scheduled for an MRI; however, she has not had this done, as she wants to transition her care to [facility].     On daily ASA; not on anticoagulation.     Chief Complaint   Patient presents with    Mass     rt shoulder       Review of patient's allergies indicates:   Allergen Reactions    Iodinated contrast- oral and iv dye Hives and Rash     As per history has problem if has IV extravasation  As per history has problem if has IV extravasation    Lisinopril Shortness Of Breath       Current Outpatient Medications   Medication Sig Dispense Refill    alprazolam (XANAX) 1 MG tablet Take 1 mg by mouth nightly as needed.       AMITIZA 24 mcg Cap Take 24 mcg by mouth as needed.       amitriptyline (ELAVIL) 10 MG tablet Take by mouth.      amitriptyline (ELAVIL) 50 MG tablet Take 50 mg by mouth 2 (two) times daily as needed.       amLODIPine (NORVASC) 5 MG tablet Take 1 tablet (5 mg total) by mouth once daily. 30 tablet 5    aspirin 81 MG Chew Take 81 mg by mouth once daily.      atorvastatin (LIPITOR) 20 MG tablet Take 1 tablet (20 mg total) by mouth once daily. 90 tablet 1    b complex vitamins tablet Take 1 tablet by mouth once daily.      carisoprodol (SOMA) 350 MG tablet Take 350 mg by mouth 3 (three) times daily.        carisoprodol (SOMA) 350 MG tablet Take 350 mg by mouth.      cod liver oil Oil Do not take  until PCP resumes  0    doxycycline (VIBRA-TABS) 100 MG tablet       DULoxetine (CYMBALTA) 20 MG capsule       ergocalciferol (VITAMIN D2) 50,000 unit Cap Take 1 capsule (50,000 Units total) by mouth twice a week. 24 capsule 0    esomeprazole (NEXIUM) 20 MG capsule Take 20 mg by mouth.      fish oil-omega-3 fatty acids 300-1,000 mg capsule Do not take until resumed by PCP      flaxseed oil 1,000 mg Cap Pt states takes 4 daily, total of 4000 mg daily   DO not take until restarted by PCP  0    fluticasone (FLONASE) 50 mcg/actuation nasal spray 2 sprays by Each Nare route once daily. (Patient taking differently: 2 sprays by Each Nare route once daily. As needed) 1 Bottle 6    FREESTYLE LITE STRIPS Strp       furosemide (LASIX) 20 MG tablet       furosemide (LASIX) 20 MG tablet Take 20 mg by mouth.      glimepiride (AMARYL) 2 MG tablet       glimepiride (AMARYL) 4 MG tablet Take 1 tablet (4 mg total) by mouth before breakfast. For Diabetes. 90 tablet 0    hydrocodone-acetaminophen 10-325mg (NORCO)  mg Tab Take 1 tablet by mouth every 4 to 6 hours as needed for Pain. 90 tablet 0    ibuprofen (ADVIL,MOTRIN) 800 MG tablet       losartan (COZAAR) 25 MG tablet Take 1 tablet (25 mg total) by mouth once daily. For Blood Pressure. 30 tablet 5    metFORMIN (GLUCOPHAGE) 1000 MG tablet       naproxen (NAPROSYN) 500 MG tablet       NEXIUM 40 mg capsule       OXYCONTIN 30 mg TR12 12 hr tablet       oxyMORphone (OPANA) 10 MG tablet Take 30 mg by mouth.      potassium chloride SA (K-DUR,KLOR-CON) 20 MEQ tablet Take 20 mEq by mouth once daily.       PSYLLIUM HUSK (METAMUCIL ORAL) Take by mouth Daily.      rosuvastatin (CRESTOR) 10 MG tablet Take 10 mg by mouth.       No current facility-administered medications for this visit.        Past Medical History:   Diagnosis Date    Allergy     Anxiety     Arthritis     DDD (degenerative disc disease), cervical     Depression     Diabetes mellitus      "Diabetes mellitus, type 2     Diverticulosis     GERD (gastroesophageal reflux disease)     Hx of colonic polyps     Lung disease     Other and unspecified hyperlipidemia     Post-traumatic osteoarthritis of both knees      Past Surgical History:   Procedure Laterality Date    ADENOIDECTOMY      COLONOSCOPY      COLONOSCOPY N/A 2013    [facility]formed [facility] (4TH F[facility])    COLONOSCOPY N/A 2013    [facility]formed by [facility] at [facility] END[facility] (4TH FLR)    epiglottis      4 times    HYSTERECTOMY      Partial, fibroids    KNEE SURGERY      MANOMETRY-ANORECTAL N/A 2016    Performed by [facility] at [facility] ENDO (4TH FLR)    NECK SURGERY      REPLACEMENT-KNEE-TOTAL Right 2017    [facility] OR 2ND FLR    SPINE SURGERY      TONSILLECTOMY      TOTAL KNEE ARTHROPLASTY Right 2017     Family History   Problem Relation Age of Onset    Cancer Father         throat     Hypertension Father     Heart disease Father     Cancer Brother     Heart failure Brother 62    Hypertension Mother     Arthritis Mother     Diabetes Paternal Aunt     Stroke Paternal Aunt     Breast cancer Neg Hx     Colon cancer Neg Hx     Ovarian cancer Neg Hx      Social History     Tobacco Use    Smoking status: Former Smoker     Packs/day: 1.00     Years: 20.00     Pack years: 20.00     Types: Cigarettes     Last attempt to quit: 2007     Years since quittin.1    Smokeless tobacco: Never Used   Substance Use Topics    Alcohol use: Yes     Comment: "not enough to talk about "    Drug use: No        Review of Systems:  Review of Systems  A 10-point ROS was negative, except as outlined above.     OBJECTIVE:     Vital Signs (Most Recent)  Pulse: 95 (19 1420)  BP: (!) 148/84 (19 1420)     116.1 kg (256 lb)     Physical Exam:  Physical Exam   Constitutional: She is oriented to person, place, and time. She appears " well-developed and well-nourished. No distress.   HENT:   Head: Normocephalic and atraumatic.   Eyes: Conjunctivae are normal. No scleral icterus.   Neck: Normal range of motion. Neck supple.   Cardiovascular: Normal rate and regular rhythm.   Pulmonary/Chest: Effort normal. No respiratory distress.   Abdominal: Soft. She exhibits no distension. There is no tenderness.   Musculoskeletal:   5 x 5 cm right shoulder mass - soft, mobile, non-tender   Neurological: She is alert and oriented to person, place, and time.   Skin: Skin is warm and dry.       Laboratory  No recent     Diagnostic Results:  None.     ASSESSMENT/PLAN:     65 yo F with right shoulder subcutaneous mass that has increased in size; likely a lipoma.     PLAN:Plan     - Discussed findings with patient; at this time, we will investigate this mass further with an MRI to help confirm out diagnosis.   - If MRI appears benign, will proceed with excision in OR - consent signed in clinic.

## 2019-04-01 ENCOUNTER — TELEPHONE (OUTPATIENT)
Dept: SURGERY | Facility: CLINIC | Age: 64
End: 2019-04-01

## 2019-04-01 NOTE — TELEPHONE ENCOUNTER
Patient was unable to have the MRI at our [facility] location. She will have it done at [facility] on 4/12/19 and will bring us the results/disc once resulted.

## 2019-04-09 ENCOUNTER — OFFICE VISIT (OUTPATIENT)
Dept: INTERNAL MEDICINE | Facility: CLINIC | Age: 64
End: 2019-04-09
Payer: MEDICARE

## 2019-04-09 ENCOUNTER — LAB VISIT (OUTPATIENT)
Dept: LAB | Facility: HOSPITAL | Age: 64
End: 2019-04-09
Attending: INTERNAL MEDICINE
Payer: MEDICARE

## 2019-04-09 VITALS
WEIGHT: 260.13 LBS | DIASTOLIC BLOOD PRESSURE: 86 MMHG | SYSTOLIC BLOOD PRESSURE: 142 MMHG | HEART RATE: 74 BPM | BODY MASS INDEX: 47.87 KG/M2 | HEIGHT: 62 IN

## 2019-04-09 DIAGNOSIS — I70.0 THORACIC AORTA ATHEROSCLEROSIS: ICD-10-CM

## 2019-04-09 DIAGNOSIS — I11.0 HYPERTENSIVE HEART DISEASE WITH HEART FAILURE: ICD-10-CM

## 2019-04-09 DIAGNOSIS — Z79.891 LONG TERM PRESCRIPTION OPIATE USE: ICD-10-CM

## 2019-04-09 DIAGNOSIS — I11.0 HYPERTENSIVE HEART DISEASE WITH HEART FAILURE: Primary | ICD-10-CM

## 2019-04-09 DIAGNOSIS — E78.5 HYPERLIPIDEMIA, UNSPECIFIED HYPERLIPIDEMIA TYPE: ICD-10-CM

## 2019-04-09 DIAGNOSIS — E11.9 TYPE 2 DIABETES MELLITUS WITHOUT COMPLICATION, WITHOUT LONG-TERM CURRENT USE OF INSULIN: ICD-10-CM

## 2019-04-09 DIAGNOSIS — E66.01 MORBID OBESITY WITH BMI OF 45.0-49.9, ADULT: ICD-10-CM

## 2019-04-09 LAB
ALBUMIN SERPL BCP-MCNC: 4.7 G/DL (ref 3.5–5.2)
ALP SERPL-CCNC: 43 U/L (ref 55–135)
ALT SERPL W/O P-5'-P-CCNC: 19 U/L (ref 10–44)
ANION GAP SERPL CALC-SCNC: 10 MMOL/L (ref 8–16)
AST SERPL-CCNC: 23 U/L (ref 10–40)
BASOPHILS # BLD AUTO: 0.05 K/UL (ref 0–0.2)
BASOPHILS NFR BLD: 1 % (ref 0–1.9)
BILIRUB SERPL-MCNC: 0.2 MG/DL (ref 0.1–1)
BUN SERPL-MCNC: 16 MG/DL (ref 8–23)
CALCIUM SERPL-MCNC: 10.4 MG/DL (ref 8.7–10.5)
CHLORIDE SERPL-SCNC: 103 MMOL/L (ref 95–110)
CO2 SERPL-SCNC: 26 MMOL/L (ref 23–29)
CREAT SERPL-MCNC: 0.8 MG/DL (ref 0.5–1.4)
DIFFERENTIAL METHOD: ABNORMAL
EOSINOPHIL # BLD AUTO: 0.1 K/UL (ref 0–0.5)
EOSINOPHIL NFR BLD: 2.1 % (ref 0–8)
ERYTHROCYTE [DISTWIDTH] IN BLOOD BY AUTOMATED COUNT: 14.2 % (ref 11.5–14.5)
EST. GFR  (AFRICAN AMERICAN): >60 ML/MIN/1.73 M^2
EST. GFR  (NON AFRICAN AMERICAN): >60 ML/MIN/1.73 M^2
ESTIMATED AVG GLUCOSE: 183 MG/DL (ref 68–131)
GLUCOSE SERPL-MCNC: 76 MG/DL (ref 70–110)
HBA1C MFR BLD HPLC: 8 % (ref 4–5.6)
HCT VFR BLD AUTO: 38.9 % (ref 37–48.5)
HGB BLD-MCNC: 11.7 G/DL (ref 12–16)
LYMPHOCYTES # BLD AUTO: 2.9 K/UL (ref 1–4.8)
LYMPHOCYTES NFR BLD: 55.7 % (ref 18–48)
MCH RBC QN AUTO: 26.3 PG (ref 27–31)
MCHC RBC AUTO-ENTMCNC: 30.1 G/DL (ref 32–36)
MCV RBC AUTO: 87 FL (ref 82–98)
MONOCYTES # BLD AUTO: 0.3 K/UL (ref 0.3–1)
MONOCYTES NFR BLD: 6.6 % (ref 4–15)
NEUTROPHILS # BLD AUTO: 1.8 K/UL (ref 1.8–7.7)
NEUTROPHILS NFR BLD: 34.4 % (ref 38–73)
PLATELET # BLD AUTO: 289 K/UL (ref 150–350)
PMV BLD AUTO: 9.9 FL (ref 9.2–12.9)
POTASSIUM SERPL-SCNC: 4.5 MMOL/L (ref 3.5–5.1)
PROT SERPL-MCNC: 8.4 G/DL (ref 6–8.4)
RBC # BLD AUTO: 4.45 M/UL (ref 4–5.4)
SODIUM SERPL-SCNC: 139 MMOL/L (ref 136–145)
WBC # BLD AUTO: 5.15 K/UL (ref 3.9–12.7)

## 2019-04-09 PROCEDURE — 99999 PR PBB SHADOW E&M-EST. PATIENT-LVL III: ICD-10-PCS | Mod: PBBFAC,,, | Performed by: INTERNAL MEDICINE

## 2019-04-09 PROCEDURE — 80053 COMPREHEN METABOLIC PANEL: CPT

## 2019-04-09 PROCEDURE — 85025 COMPLETE CBC W/AUTO DIFF WBC: CPT

## 2019-04-09 PROCEDURE — 36415 COLL VENOUS BLD VENIPUNCTURE: CPT

## 2019-04-09 PROCEDURE — 99214 PR OFFICE/OUTPT VISIT, EST, LEVL IV, 30-39 MIN: ICD-10-PCS | Mod: S$PBB,,, | Performed by: INTERNAL MEDICINE

## 2019-04-09 PROCEDURE — 99214 OFFICE O/P EST MOD 30 MIN: CPT | Mod: S$PBB,,, | Performed by: INTERNAL MEDICINE

## 2019-04-09 PROCEDURE — 99999 PR PBB SHADOW E&M-EST. PATIENT-LVL III: CPT | Mod: PBBFAC,,, | Performed by: INTERNAL MEDICINE

## 2019-04-09 PROCEDURE — 99213 OFFICE O/P EST LOW 20 MIN: CPT | Mod: PBBFAC | Performed by: INTERNAL MEDICINE

## 2019-04-09 PROCEDURE — 83036 HEMOGLOBIN GLYCOSYLATED A1C: CPT

## 2019-04-19 PROBLEM — Z79.891 LONG TERM PRESCRIPTION OPIATE USE: Status: ACTIVE | Noted: 2017-05-17
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
## 2019-05-07 DIAGNOSIS — E11.9 TYPE 2 DIABETES MELLITUS WITHOUT COMPLICATION, WITHOUT LONG-TERM CURRENT USE OF INSULIN: ICD-10-CM

## 2019-05-07 RX ORDER — GLIMEPIRIDE 4 MG/1
4 TABLET ORAL
Qty: 90 TABLET | Refills: 1 | Status: SHIPPED | OUTPATIENT
Start: 2019-05-07 | End: 2019-06-10 | Stop reason: SDUPTHER

## 2019-06-10 DIAGNOSIS — I11.0 HYPERTENSIVE HEART DISEASE WITH HEART FAILURE: ICD-10-CM

## 2019-06-10 DIAGNOSIS — E11.9 TYPE 2 DIABETES MELLITUS WITHOUT COMPLICATION, WITHOUT LONG-TERM CURRENT USE OF INSULIN: ICD-10-CM

## 2019-06-10 RX ORDER — AMLODIPINE BESYLATE 5 MG/1
TABLET ORAL
Qty: 90 TABLET | Refills: 0 | Status: SHIPPED | OUTPATIENT
Start: 2019-06-10 | End: 2019-10-18 | Stop reason: SDUPTHER

## 2019-06-10 RX ORDER — GLIMEPIRIDE 4 MG/1
4 TABLET ORAL
Qty: 90 TABLET | Refills: 0 | Status: SHIPPED | OUTPATIENT
Start: 2019-06-10 | End: 2020-03-09 | Stop reason: ALTCHOICE

## 2019-06-28 ENCOUNTER — TELEPHONE (OUTPATIENT)
Dept: ORTHOPEDICS | Facility: CLINIC | Age: 64
End: 2019-06-28

## 2019-06-28 NOTE — TELEPHONE ENCOUNTER
----- Message from Andrea Whipple sent at 6/28/2019  1:00 PM CDT -----  Contact: Self   Pt would like to discuss surgery. She is in pain.     Please contact pt. [phone]       We spoke  appt booked for 7/23   Told she must have a BMI under 40  Hers is 47   She said she is working on it

## 2019-07-23 ENCOUNTER — OFFICE VISIT (OUTPATIENT)
Dept: ORTHOPEDICS | Facility: CLINIC | Age: 64
End: 2019-07-23
Payer: MEDICARE

## 2019-07-23 VITALS — BODY MASS INDEX: 47.95 KG/M2 | WEIGHT: 260.56 LBS | HEIGHT: 62 IN

## 2019-07-23 DIAGNOSIS — M17.12 PRIMARY OSTEOARTHRITIS OF LEFT KNEE: Primary | ICD-10-CM

## 2019-07-23 PROCEDURE — 99212 OFFICE O/P EST SF 10 MIN: CPT | Mod: PBBFAC | Performed by: ORTHOPAEDIC SURGERY

## 2019-07-23 PROCEDURE — 99999 PR PBB SHADOW E&M-EST. PATIENT-LVL II: CPT | Mod: PBBFAC,,, | Performed by: ORTHOPAEDIC SURGERY

## 2019-07-23 PROCEDURE — 99214 OFFICE O/P EST MOD 30 MIN: CPT | Mod: S$PBB,,, | Performed by: ORTHOPAEDIC SURGERY

## 2019-07-23 PROCEDURE — 99214 PR OFFICE/OUTPT VISIT, EST, LEVL IV, 30-39 MIN: ICD-10-PCS | Mod: S$PBB,,, | Performed by: ORTHOPAEDIC SURGERY

## 2019-07-23 PROCEDURE — 99999 PR PBB SHADOW E&M-EST. PATIENT-LVL II: ICD-10-PCS | Mod: PBBFAC,,, | Performed by: ORTHOPAEDIC SURGERY

## 2019-07-23 NOTE — PROGRESS NOTES
HPI:    Isabel Dennis is a 64 y.o. female who is here today for   Chief Complaint   Patient presents with    Left Knee - Pain   .   Patient had a right total knee by me April of 2017 and did well.  Unfortunately she has been doing poorly with her weight.  She was 249 when we did her right knee and she is now in the 260's  Duration: 12 months  Intensity: severe  Character of pain: sharp  Location: She reports that the pain is predominately  medial    Past Medical History:   Diagnosis Date    Allergy     Anxiety     Arthritis     DDD (degenerative disc disease), cervical     Depression     Diabetes mellitus     Diabetes mellitus, type 2     Diverticulosis     GERD (gastroesophageal reflux disease)     Hx of colonic polyps     Lung disease     Other and unspecified hyperlipidemia     Post-traumatic osteoarthritis of both knees     Type 2 diabetes mellitus with diabetic cataract, without long-term current use of insulin           Current Outpatient Medications:     alprazolam (XANAX) 1 MG tablet, Take 1 mg by mouth nightly as needed. , Disp: , Rfl:     AMITIZA 24 mcg Cap, Take 24 mcg by mouth as needed. , Disp: , Rfl:     amitriptyline (ELAVIL) 50 MG tablet, Take 50 mg by mouth 2 (two) times daily as needed. , Disp: , Rfl:     amLODIPine (NORVASC) 5 MG tablet, TAKE ONE TABLET BY MOUTH ONCE DAILY, Disp: 90 tablet, Rfl: 0    aspirin 81 MG Chew, Take 81 mg by mouth once daily., Disp: , Rfl:     atorvastatin (LIPITOR) 20 MG tablet, Take 1 tablet (20 mg total) by mouth once daily., Disp: 90 tablet, Rfl: 1    b complex vitamins tablet, Take 1 tablet by mouth once daily., Disp: , Rfl:     carisoprodol (SOMA) 350 MG tablet, Take 350 mg by mouth., Disp: , Rfl:     cod liver oil Oil, Do not take until PCP resumes, Disp: , Rfl: 0    ergocalciferol (VITAMIN D2) 50,000 unit Cap, Take 1 capsule (50,000 Units total) by mouth twice a week., Disp: 24 capsule, Rfl: 0    esomeprazole (NEXIUM) 20 MG capsule,  Take 20 mg by mouth., Disp: , Rfl:     etodolac (LODINE) 200 MG Cap, Take 1 capsule (200 mg total) by mouth 3 (three) times daily., Disp: 30 capsule, Rfl: 0    fish oil-omega-3 fatty acids 300-1,000 mg capsule, Do not take until resumed by PCP, Disp: , Rfl:     flaxseed oil 1,000 mg Cap, Pt states takes 4 daily, total of 4000 mg daily  DO not take until restarted by PCP, Disp: , Rfl: 0    fluticasone (FLONASE) 50 mcg/actuation nasal spray, 2 sprays by Each Nare route once daily. (Patient taking differently: 2 sprays by Each Nare route once daily. As needed), Disp: 1 Bottle, Rfl: 6    FREESTYLE LITE STRIPS Strp, , Disp: , Rfl:     furosemide (LASIX) 20 MG tablet, Take 20 mg by mouth., Disp: , Rfl:     glimepiride (AMARYL) 4 MG tablet, TAKE 1 TABLET (4 MG TOTAL) BY MOUTH BEFORE BREAKFAST. FOR DIABETES., Disp: 90 tablet, Rfl: 0    hydrocodone-acetaminophen 10-325mg (NORCO)  mg Tab, Take 1 tablet by mouth every 4 to 6 hours as needed for Pain., Disp: 90 tablet, Rfl: 0    ibuprofen (ADVIL,MOTRIN) 800 MG tablet, , Disp: , Rfl:     metFORMIN (GLUCOPHAGE) 1000 MG tablet, , Disp: , Rfl:     naproxen (NAPROSYN) 500 MG tablet, , Disp: , Rfl:     OXYCONTIN 30 mg TR12 12 hr tablet, , Disp: , Rfl:     potassium chloride SA (K-DUR,KLOR-CON) 20 MEQ tablet, Take 20 mEq by mouth once daily. , Disp: , Rfl:     PSYLLIUM HUSK (METAMUCIL ORAL), Take by mouth Daily., Disp: , Rfl:     DULoxetine (CYMBALTA) 20 MG capsule, , Disp: , Rfl:     NEXIUM 40 mg capsule, , Disp: , Rfl:     oxyMORphone (OPANA) 10 MG tablet, Take 30 mg by mouth., Disp: , Rfl:      Review of patient's allergies indicates:   Allergen Reactions    Iodinated contrast- oral and iv dye Hives and Rash     As per history has problem if has IV extravasation  As per history has problem if has IV extravasation    Lisinopril Shortness Of Breath        ROS  Constitutional: Negative for fever, Negative for weight loss  HENT Negative for  "congestion  Cardiovascular: Negative chest pain  Respiratory: Negative Shortness of breath  Heme: Negative excessive bleeding  Skin:PositiveItching, Negative breakdown  Musculoskeletal:Positive for back pain, Positive for joint pain, Positive muscle pain, Negative muscle weakness  Neurological: Negative for numbness and paresthesias   Psychiatric/Behavioral: Negative altered mental status, Negative for depression    Physical Exam:   Ht 5' 2" (1.575 m)   Wt 118.2 kg (260 lb 9.3 oz)   BMI 47.66 kg/m²   General appearance: This is a well-developed, Well nourished female No obvious acute distress.  Psychiatric: normal mood and affect;  pleasant and conversant; judgment and thought content normal  Gait is coordinated. Patient has antalgic gait to the left  Cardiovascular: There are no swelling or varicosities present.   Respiratory: normal respiratory effort   Lymphatic: no adenopathy   Neurologic: alert and oriented to person, place, and time   Deep Tendon Reflexes are normal;  Coordination and Balance: Normal   Musculoskeletal   Neck    ROM shows normal flexion and extension and lateral rotation    Palpation: Non-tender    Stability is normal    Strength is normal    Skin is normal without masses and lesions    Sensation is intact to light touch   Back    ROM showsabnormal flexion, extension    and  rotation    Palpation shows no masses    Stability is normal    Strength to flexion and extension well maintained    Core strength is diminished    Skin shows no rashes or cafe au lait spots;     Sensation is intact to light touch    Right Knee  Swelling None  TendernessNone  Range of Motion: 120    Left Knee: Swelling Mild  TendernessMedial joint line  Range of Motion: 5-110    Radiograph   Osteoarthritis: severe  Angle: significant varus    Assessment:  Severe osteoarthritis left knee.    Plan:  Radiograph show a severely eroded and damage left knee that needs to be replaced.  I told the patient she would have to lose a " full 20 lb and maintain a proper A1c  Before we would recommend surgery.

## 2019-10-12 DIAGNOSIS — I11.0 HYPERTENSIVE HEART DISEASE WITH HEART FAILURE: ICD-10-CM

## 2019-10-13 RX ORDER — AMLODIPINE BESYLATE 5 MG/1
TABLET ORAL
Qty: 90 TABLET | Refills: 0 | OUTPATIENT
Start: 2019-10-13

## 2019-10-18 DIAGNOSIS — I11.0 HYPERTENSIVE HEART DISEASE WITH HEART FAILURE: ICD-10-CM

## 2019-10-19 RX ORDER — AMLODIPINE BESYLATE 5 MG/1
TABLET ORAL
Qty: 90 TABLET | Refills: 0 | Status: SHIPPED | OUTPATIENT
Start: 2019-10-19

## 2019-12-17 DIAGNOSIS — E11.9 TYPE 2 DIABETES MELLITUS WITHOUT COMPLICATION, WITHOUT LONG-TERM CURRENT USE OF INSULIN: ICD-10-CM

## 2019-12-18 RX ORDER — GLIMEPIRIDE 4 MG/1
4 TABLET ORAL
Qty: 90 TABLET | Refills: 0 | OUTPATIENT
Start: 2019-12-18

## 2020-01-14 ENCOUNTER — PATIENT OUTREACH (OUTPATIENT)
Dept: ADMINISTRATIVE | Facility: OTHER | Age: 65
End: 2020-01-14

## 2020-01-14 ENCOUNTER — LAB VISIT (OUTPATIENT)
Dept: LAB | Facility: HOSPITAL | Age: 65
End: 2020-01-14
Attending: INTERNAL MEDICINE
Payer: MEDICARE

## 2020-01-14 ENCOUNTER — OFFICE VISIT (OUTPATIENT)
Dept: INTERNAL MEDICINE | Facility: CLINIC | Age: 65
End: 2020-01-14
Payer: MEDICARE

## 2020-01-14 VITALS
WEIGHT: 245.13 LBS | BODY MASS INDEX: 45.11 KG/M2 | HEART RATE: 78 BPM | SYSTOLIC BLOOD PRESSURE: 158 MMHG | HEIGHT: 62 IN | DIASTOLIC BLOOD PRESSURE: 102 MMHG | OXYGEN SATURATION: 98 %

## 2020-01-14 DIAGNOSIS — G89.4 CHRONIC PAIN SYNDROME: ICD-10-CM

## 2020-01-14 DIAGNOSIS — E11.9 TYPE 2 DIABETES MELLITUS WITHOUT COMPLICATION, WITHOUT LONG-TERM CURRENT USE OF INSULIN: Primary | ICD-10-CM

## 2020-01-14 DIAGNOSIS — I10 ESSENTIAL HYPERTENSION, BENIGN: ICD-10-CM

## 2020-01-14 DIAGNOSIS — E11.9 DIABETES MELLITUS WITHOUT COMPLICATION: Primary | ICD-10-CM

## 2020-01-14 DIAGNOSIS — D17.21 LIPOMA OF RIGHT UPPER EXTREMITY: ICD-10-CM

## 2020-01-14 DIAGNOSIS — E11.9 TYPE 2 DIABETES MELLITUS WITHOUT COMPLICATION, WITHOUT LONG-TERM CURRENT USE OF INSULIN: ICD-10-CM

## 2020-01-14 DIAGNOSIS — K59.03 DRUG-INDUCED CONSTIPATION: ICD-10-CM

## 2020-01-14 DIAGNOSIS — Z79.891 LONG TERM PRESCRIPTION OPIATE USE: ICD-10-CM

## 2020-01-14 LAB
ALBUMIN SERPL BCP-MCNC: 4.4 G/DL (ref 3.5–5.2)
ALBUMIN/CREAT UR: 8.9 UG/MG (ref 0–30)
ALP SERPL-CCNC: 38 U/L (ref 55–135)
ALT SERPL W/O P-5'-P-CCNC: 13 U/L (ref 10–44)
ANION GAP SERPL CALC-SCNC: 10 MMOL/L (ref 8–16)
AST SERPL-CCNC: 23 U/L (ref 10–40)
BILIRUB SERPL-MCNC: 0.3 MG/DL (ref 0.1–1)
BUN SERPL-MCNC: 16 MG/DL (ref 8–23)
CALCIUM SERPL-MCNC: 10.1 MG/DL (ref 8.7–10.5)
CHLORIDE SERPL-SCNC: 101 MMOL/L (ref 95–110)
CO2 SERPL-SCNC: 23 MMOL/L (ref 23–29)
CREAT SERPL-MCNC: 0.9 MG/DL (ref 0.5–1.4)
CREAT UR-MCNC: 56 MG/DL (ref 15–325)
EST. GFR  (AFRICAN AMERICAN): >60 ML/MIN/1.73 M^2
EST. GFR  (NON AFRICAN AMERICAN): >60 ML/MIN/1.73 M^2
ESTIMATED AVG GLUCOSE: 134 MG/DL (ref 68–131)
GLUCOSE SERPL-MCNC: 129 MG/DL (ref 70–110)
HBA1C MFR BLD HPLC: 6.3 % (ref 4–5.6)
MICROALBUMIN UR DL<=1MG/L-MCNC: 5 UG/ML
POTASSIUM SERPL-SCNC: 5.3 MMOL/L (ref 3.5–5.1)
PROT SERPL-MCNC: 8.5 G/DL (ref 6–8.4)
SODIUM SERPL-SCNC: 134 MMOL/L (ref 136–145)

## 2020-01-14 PROCEDURE — 99215 OFFICE O/P EST HI 40 MIN: CPT | Mod: S$PBB,,, | Performed by: INTERNAL MEDICINE

## 2020-01-14 PROCEDURE — 99215 PR OFFICE/OUTPT VISIT, EST, LEVL V, 40-54 MIN: ICD-10-PCS | Mod: S$PBB,,, | Performed by: INTERNAL MEDICINE

## 2020-01-14 PROCEDURE — 99999 PR PBB SHADOW E&M-EST. PATIENT-LVL V: CPT | Mod: PBBFAC,,, | Performed by: INTERNAL MEDICINE

## 2020-01-14 PROCEDURE — 36415 COLL VENOUS BLD VENIPUNCTURE: CPT

## 2020-01-14 PROCEDURE — 99215 OFFICE O/P EST HI 40 MIN: CPT | Mod: PBBFAC | Performed by: INTERNAL MEDICINE

## 2020-01-14 PROCEDURE — 83036 HEMOGLOBIN GLYCOSYLATED A1C: CPT

## 2020-01-14 PROCEDURE — 82043 UR ALBUMIN QUANTITATIVE: CPT

## 2020-01-14 PROCEDURE — 80053 COMPREHEN METABOLIC PANEL: CPT

## 2020-01-14 PROCEDURE — 99999 PR PBB SHADOW E&M-EST. PATIENT-LVL V: ICD-10-PCS | Mod: PBBFAC,,, | Performed by: INTERNAL MEDICINE

## 2020-01-14 RX ORDER — NALOXONE HYDROCHLORIDE 4 MG/.1ML
1 SPRAY NASAL ONCE
Qty: 1 EACH | Refills: 11 | Status: SHIPPED | OUTPATIENT
Start: 2020-01-14 | End: 2020-01-14
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
## 2020-01-16 ENCOUNTER — TELEPHONE (OUTPATIENT)
Dept: PAIN MEDICINE | Facility: OTHER | Age: 65
End: 2020-01-16

## 2020-01-16 DIAGNOSIS — E11.9 TYPE 2 DIABETES MELLITUS WITHOUT COMPLICATION: ICD-10-CM

## 2020-01-20 ENCOUNTER — TELEPHONE (OUTPATIENT)
Dept: INTERNAL MEDICINE | Facility: CLINIC | Age: 65
End: 2020-01-20

## 2020-01-20 NOTE — TELEPHONE ENCOUNTER
spoke with pt, notified of results and message below. pt advised to stop glimepiride and restart metformin and to stop K supplement. Pt repeated back and verbalized understating

## 2020-01-20 NOTE — TELEPHONE ENCOUNTER
----- Message from Mehrdad Olguin MD sent at 1/17/2020  4:03 PM CST -----  Patient's a1c is 6.3. Recommend stopping glimepiride and restart metformin since glimepiride puts her at risk for hypoglycemia       Also, her potassium was a little bit high. Recommend she stop taking K supplement.     Let me know if you have any further questions  Mehrdad Olguin

## 2020-01-22 ENCOUNTER — TELEPHONE (OUTPATIENT)
Dept: PAIN MEDICINE | Facility: OTHER | Age: 65
End: 2020-01-22

## 2020-01-24 ENCOUNTER — PATIENT OUTREACH (OUTPATIENT)
Dept: ADMINISTRATIVE | Facility: OTHER | Age: 65
End: 2020-01-24

## 2020-01-24 ENCOUNTER — OFFICE VISIT (OUTPATIENT)
Dept: PAIN MEDICINE | Facility: OTHER | Age: 65
End: 2020-01-24
Payer: MEDICARE

## 2020-01-24 DIAGNOSIS — R53.81 PHYSICAL DECONDITIONING: ICD-10-CM

## 2020-01-24 DIAGNOSIS — F32.A DEPRESSION, UNSPECIFIED DEPRESSION TYPE: ICD-10-CM

## 2020-01-24 DIAGNOSIS — M50.30 DDD (DEGENERATIVE DISC DISEASE), CERVICAL: ICD-10-CM

## 2020-01-24 DIAGNOSIS — M43.16 SPONDYLOLISTHESIS OF LUMBAR REGION: ICD-10-CM

## 2020-01-24 DIAGNOSIS — M51.36 DDD (DEGENERATIVE DISC DISEASE), LUMBAR: ICD-10-CM

## 2020-01-24 DIAGNOSIS — R52 PAIN AGGRAVATED BY ACTIVITIES OF DAILY LIVING: ICD-10-CM

## 2020-01-24 DIAGNOSIS — M25.562 CHRONIC PAIN OF BOTH KNEES: ICD-10-CM

## 2020-01-24 DIAGNOSIS — M25.561 CHRONIC PAIN OF BOTH KNEES: ICD-10-CM

## 2020-01-24 DIAGNOSIS — G89.29 CHRONIC PAIN OF BOTH KNEES: ICD-10-CM

## 2020-01-24 DIAGNOSIS — Z78.9 IMPAIRED INSTRUMENTAL ACTIVITIES OF DAILY LIVING (IADL): ICD-10-CM

## 2020-01-24 DIAGNOSIS — M17.12 OSTEOARTHRITIS OF LEFT KNEE, UNSPECIFIED OSTEOARTHRITIS TYPE: ICD-10-CM

## 2020-01-24 DIAGNOSIS — G89.4 CHRONIC PAIN DISORDER: Primary | ICD-10-CM

## 2020-01-24 DIAGNOSIS — M16.0 OSTEOARTHRITIS OF BOTH HIPS, UNSPECIFIED OSTEOARTHRITIS TYPE: ICD-10-CM

## 2020-01-24 DIAGNOSIS — Z98.1 HISTORY OF FUSION OF CERVICAL SPINE: ICD-10-CM

## 2020-01-24 DIAGNOSIS — Z78.9 DECREASED ACTIVITIES OF DAILY LIVING (ADL): ICD-10-CM

## 2020-01-24 PROCEDURE — 99215 PR OFFICE/OUTPT VISIT, EST, LEVL V, 40-54 MIN: ICD-10-PCS | Mod: ,,, | Performed by: NURSE PRACTITIONER

## 2020-01-24 PROCEDURE — 99215 OFFICE O/P EST HI 40 MIN: CPT | Mod: ,,, | Performed by: NURSE PRACTITIONER
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
## 2020-01-27 ENCOUNTER — TELEPHONE (OUTPATIENT)
Dept: PAIN MEDICINE | Facility: OTHER | Age: 65
End: 2020-01-27

## 2020-01-27 DIAGNOSIS — G89.29 CHRONIC PAIN OF BOTH KNEES: ICD-10-CM

## 2020-01-27 DIAGNOSIS — M25.561 CHRONIC PAIN OF BOTH KNEES: ICD-10-CM

## 2020-01-27 DIAGNOSIS — M25.562 CHRONIC PAIN OF BOTH KNEES: ICD-10-CM

## 2020-01-27 DIAGNOSIS — F32.A DEPRESSION, UNSPECIFIED DEPRESSION TYPE: ICD-10-CM

## 2020-01-27 DIAGNOSIS — Z78.9 IMPAIRED INSTRUMENTAL ACTIVITIES OF DAILY LIVING (IADL): ICD-10-CM

## 2020-01-27 DIAGNOSIS — R52 PAIN AGGRAVATED BY ACTIVITIES OF DAILY LIVING: ICD-10-CM

## 2020-01-27 DIAGNOSIS — M51.36 DDD (DEGENERATIVE DISC DISEASE), LUMBAR: ICD-10-CM

## 2020-01-27 DIAGNOSIS — R53.81 PHYSICAL DECONDITIONING: ICD-10-CM

## 2020-01-27 DIAGNOSIS — M17.12 OSTEOARTHRITIS OF LEFT KNEE, UNSPECIFIED OSTEOARTHRITIS TYPE: ICD-10-CM

## 2020-01-27 DIAGNOSIS — M50.30 DDD (DEGENERATIVE DISC DISEASE), CERVICAL: ICD-10-CM

## 2020-01-27 DIAGNOSIS — Z78.9 DECREASED ACTIVITIES OF DAILY LIVING (ADL): ICD-10-CM

## 2020-01-27 DIAGNOSIS — M43.16 SPONDYLOLISTHESIS OF LUMBAR REGION: ICD-10-CM

## 2020-01-27 DIAGNOSIS — M16.0 OSTEOARTHRITIS OF BOTH HIPS, UNSPECIFIED OSTEOARTHRITIS TYPE: ICD-10-CM

## 2020-01-27 DIAGNOSIS — Z98.1 HISTORY OF FUSION OF CERVICAL SPINE: ICD-10-CM

## 2020-01-27 DIAGNOSIS — G89.4 CHRONIC PAIN DISORDER: Primary | ICD-10-CM

## 2020-01-27 NOTE — TELEPHONE ENCOUNTER
Spoke with pt about scheduling eval with PT/OT she said she does not want to schedule because she wants to do therapy now and have knee surgery next month. She was unhappy that our next few months are full and the next available spot is April.

## 2020-01-29 ENCOUNTER — OFFICE VISIT (OUTPATIENT)
Dept: SURGERY | Facility: CLINIC | Age: 65
End: 2020-01-29
Payer: MEDICARE

## 2020-01-29 ENCOUNTER — TELEPHONE (OUTPATIENT)
Dept: PAIN MEDICINE | Facility: OTHER | Age: 65
End: 2020-01-29

## 2020-01-29 VITALS
HEART RATE: 76 BPM | DIASTOLIC BLOOD PRESSURE: 89 MMHG | WEIGHT: 244.5 LBS | TEMPERATURE: 98 F | SYSTOLIC BLOOD PRESSURE: 143 MMHG | BODY MASS INDEX: 44.99 KG/M2 | HEIGHT: 62 IN

## 2020-01-29 DIAGNOSIS — R22.30 SHOULDER MASS: Primary | ICD-10-CM

## 2020-01-29 DIAGNOSIS — D17.1 LIPOMA OF TORSO: Primary | ICD-10-CM

## 2020-01-29 PROCEDURE — 99214 OFFICE O/P EST MOD 30 MIN: CPT | Mod: S$PBB,,, | Performed by: SURGERY

## 2020-01-29 PROCEDURE — 99999 PR PBB SHADOW E&M-EST. PATIENT-LVL III: ICD-10-PCS | Mod: PBBFAC,,, | Performed by: SURGERY

## 2020-01-29 PROCEDURE — 99999 PR PBB SHADOW E&M-EST. PATIENT-LVL III: CPT | Mod: PBBFAC,,, | Performed by: SURGERY

## 2020-01-29 PROCEDURE — 99214 PR OFFICE/OUTPT VISIT, EST, LEVL IV, 30-39 MIN: ICD-10-PCS | Mod: S$PBB,,, | Performed by: SURGERY

## 2020-01-29 PROCEDURE — 99213 OFFICE O/P EST LOW 20 MIN: CPT | Mod: PBBFAC | Performed by: SURGERY

## 2020-01-29 NOTE — PATIENT INSTRUCTIONS
Understanding a Lipoma    A lipoma is a benign lump under the skin thats made of fat. Its not cancer. It feels soft like rubber when you press it, and in most cases it doesnt hurt. Some people have more than one. A lipoma grows slowly over time and doesnt cause many problems. Lipomas occur most often in adults from ages 40 to 60, and more often in men.  How to say it  [id number]   What causes a lipoma?  The cause is not yet known. Experts are still learning more. It may be partly caused by a problem in a gene. They can run in families. Familial multiple lipomatosis is when 2 or more family members have many lipomas.  Symptoms of a lipoma  The main symptom of a lipoma is a soft lump under the skin that doesnt hurt unless it is pressing on a nerve. It may be small, around 1/4 inch across. Or it may be larger, up to 4 inches across or more.  There are different kinds of lipomas. The most common kind occurs under the skin of the shoulders, chest, back, belly, or under the arms. In some cases, a lipoma can occur on the legs. In rare cases, one may occur deeper in the body or in a muscle.  Treatment for a lipoma  In most cases, a lipoma doesnt need treatment. Your healthcare provider may look at it during regular checkups to see if it changes.  But if the lipoma is painful or you want it removed for cosmetic reasons, it can be removed with surgery. The surgery is called excision. The lipoma will most likely not grow back after surgery. During surgery, the area around the lipoma is numbed. If you have a deep lipoma, you may need medicine called regional anesthesia to numb a larger area. Or you may need medicine called general anesthesia to put you to sleep during the procedure. Then the doctor makes a cut over the area of the lipoma. He or she removes the lump of fat. The cut is then closed with stitches.  Possible complications of a lipoma  A large lipoma inside the body can press on organs, nerves, or other  tissues and cause problems. For example, it can cause problems with breathing or digestion.  Living with a lipoma  Your healthcare provider may look at the lipoma during regular checkups to see if it changes or is causing problems.  When to call your healthcare provider  Call your healthcare provider right away if you have any of these:  · Lipoma that grows quickly, causes pain, or feels hard  · Growth of new lipomas   Date Last Reviewed: 5/1/2016  © 4226-3401 [employer] [address] [place] [address]. All rights reserved. This information is not intended as a substitute for professional medical care. Always follow your healthcare professional's instructions.

## 2020-01-29 NOTE — TELEPHONE ENCOUNTER
Spoke to pt about coming in for FRP PT/OT screening visit. She is agreeable to the appt and it was scheduled for Feb 10 at 2:30 pm.

## 2020-01-29 NOTE — H&P (VIEW-ONLY)
History & Physical    SUBJECTIVE:     History of Present Illness:  Patient is a 64 y.o. female presents with a large soft tissue mass on the posterior right shoulder associated with pain. Onset of symptoms was gradual starting 2 years ago with gradually worsening course since that time. Patient denies redness or drainage. Symptoms are aggravated by activity. Symptoms improve with rest.      Chief Complaint   Patient presents with    Consult       Review of patient's allergies indicates:   Allergen Reactions    Iodinated contrast media Hives and Rash     As per history has problem if has IV extravasation  As per history has problem if has IV extravasation    Lisinopril Shortness Of Breath       Current Outpatient Medications   Medication Sig Dispense Refill    alprazolam (XANAX) 1 MG tablet Take 1 mg by mouth nightly as needed.       amitriptyline (ELAVIL) 50 MG tablet Take 50 mg by mouth 2 (two) times daily as needed.       amLODIPine (NORVASC) 5 MG tablet TAKE ONE TABLET BY MOUTH ONCE DAILY 90 tablet 0    aspirin 81 MG Chew Take 81 mg by mouth once daily.      b complex vitamins tablet Take 1 tablet by mouth once daily.      carisoprodol (SOMA) 350 MG tablet Take 350 mg by mouth.      cod liver oil Oil Do not take until PCP resumes  0    ergocalciferol (VITAMIN D2) 50,000 unit Cap Take 1 capsule (50,000 Units total) by mouth twice a week. 24 capsule 0    fish oil-omega-3 fatty acids 300-1,000 mg capsule Do not take until resumed by PCP      flaxseed oil 1,000 mg Cap Pt states takes 4 daily, total of 4000 mg daily   DO not take until restarted by PCP  0    FREESTYLE LITE STRIPS Strp       furosemide (LASIX) 20 MG tablet Take 20 mg by mouth.      hydrocodone-acetaminophen 10-325mg (NORCO)  mg Tab Take 1 tablet by mouth every 4 to 6 hours as needed for Pain. 90 tablet 0    ibuprofen (ADVIL,MOTRIN) 800 MG tablet       metFORMIN (GLUCOPHAGE) 1000 MG tablet       naproxen (NAPROSYN) 500 MG tablet  "      OXYCONTIN 30 mg TR12 12 hr tablet       potassium chloride SA (K-DUR,KLOR-CON) 20 MEQ tablet Take 20 mEq by mouth once daily.       PSYLLIUM HUSK (METAMUCIL ORAL) Take by mouth Daily.      glimepiride (AMARYL) 4 MG tablet TAKE 1 TABLET (4 MG TOTAL) BY MOUTH BEFORE BREAKFAST. FOR DIABETES. (Patient not taking: Reported on 2020) 90 tablet 0     No current facility-administered medications for this visit.        Past Medical History:   Diagnosis Date    Allergy     Anxiety     Arthritis     DDD (degenerative disc disease), cervical     Depression     Diabetes mellitus     Diabetes mellitus, type 2     Diverticulosis     GERD (gastroesophageal reflux disease)     Hx of colonic polyps     Lung disease     Other and unspecified hyperlipidemia     Post-traumatic osteoarthritis of both knees     Type 2 diabetes mellitus with diabetic cataract, without long-term current use of insulin      Past Surgical History:   Procedure Laterality Date    ADENOIDECTOMY      COLONOSCOPY      epiglottis      4 times    HYSTERECTOMY      Partial, fibroids    KNEE SURGERY      NECK SURGERY      SPINE SURGERY      TONSILLECTOMY      TOTAL KNEE ARTHROPLASTY Right 2017     Family History   Problem Relation Age of Onset    Cancer Father         throat     Hypertension Father     Heart disease Father     Cancer Brother     Heart failure Brother 62    Hypertension Mother     Arthritis Mother     Diabetes Paternal Aunt     Stroke Paternal Aunt     Breast cancer Neg Hx     Colon cancer Neg Hx     Ovarian cancer Neg Hx      Social History     Tobacco Use    Smoking status: Former Smoker     Packs/day: 1.00     Years: 20.00     Pack years: 20.00     Types: Cigarettes     Last attempt to quit: 2007     Years since quittin.0    Smokeless tobacco: Never Used   Substance Use Topics    Alcohol use: Yes     Comment: "not enough to talk about "    Drug use: No        Review of Systems:      I " "have reviewed the Patient Summary Reports.        Review of Systems  Anesthesia Hx:  No problems with previous Anesthesia    Social:  Non-Smoker    Hematology/Oncology:  Hematology Normal   Oncology Normal     Cardiovascular:  Cardiovascular Normal     Pulmonary:  Pulmonary Normal    Hepatic/GI:   GERD    Neurological:  Neurology Normal    Endocrine:   Diabetes    Psych:   Psychiatric History          OBJECTIVE:     Vital Signs (Most Recent)  Temp: 98 °F (36.7 °C) (01/29/20 1032)  Pulse: 76 (01/29/20 1032)  BP: (!) 143/89 (01/29/20 1032)  5' 2" (1.575 m)  110.9 kg (244 lb 7.8 oz)     Physical Exam:    Physical Exam  General:  Well nourished, Obesity    Airway/Jaw/Neck:  AIRWAY FINDINGS: Normal      Eyes/Ears/Nose:  EYES/EARS/NOSE FINDINGS: Normal   Dental:  DENTAL FINDINGS: Normal   Chest/Lungs:  Chest/Lungs Clear    Heart/Vascular:  Heart Findings: Normal Heart murmur: negative Vascular Findings: Normal    Abdomen:  Abdomen Findings: Normal    Musculoskeletal:  Musculoskeletal Findings: Normal   Skin:  Skin Findings:  8-10 cm soft tissue mass c/w lipoma posterior right shoulder.  Mobile, non-tender          Laboratory      Diagnostic Results:  none    ASSESSMENT/PLAN:     Right posterior shoulder lipoma    PLAN:Plan     Removal in OR       "

## 2020-01-29 NOTE — LETTER
January 29, 2020      Mehrdad Olguin MD  [address]  [facility] [address]           [facility]  [address]  [facility] [address]  Phone: [phone]          Patient: Isabel Dennis   MR Number: [id number]   YOB: 1955   Date of Visit: 1/29/2020       Dear Dr. Mehrdad Olguin:    Thank you for referring Isabel Dennis to me for evaluation. Attached you will find relevant portions of my assessment and plan of care.    If you have questions, please do not hesitate to call me. I look forward to following Isabel Dennis along with you.    Sincerely,    Dick Ospina MD    Enclosure  CC:  No Recipients    If you would like to receive this communication electronically, please contact [email or web address] or [phone] to request more information on [facility] Link access.    For providers and/or their staff who would like to refer a patient to [facility], please contact us through our one-stop-shop provider referral line, [facility], at [phone].    If you feel you have received this communication in error or would no longer like to receive these types of communications, please e-mail [email or web address]

## 2020-01-30 DIAGNOSIS — D17.20 LIPOMA OF SHOULDER: ICD-10-CM

## 2020-01-30 RX ORDER — SODIUM CHLORIDE 9 MG/ML
INJECTION, SOLUTION INTRAVENOUS CONTINUOUS
Status: CANCELLED | OUTPATIENT
Start: 2020-01-30

## 2020-02-05 ENCOUNTER — TELEPHONE (OUTPATIENT)
Dept: SURGERY | Facility: CLINIC | Age: 65
End: 2020-02-05

## 2020-02-05 NOTE — PRE-PROCEDURE INSTRUCTIONS
PREOP INSTRUCTIONS:No solid food ,milk or milk products for 8 hours prior to procedure.Clear liquids are allowed up to 2 hours before procedure.Clear liquids are:water,apple juice,gatorade & powerade.Shower instructions as well as directions to the Surgery Center were given.Patient encouraged to wear loose fitting,comfortable clothing.Medication instructions for pm prior to and am of procedure reviewed.Instructed patient to avoid taking vitamins,supplements,aspirin and ibuprofen the morning of surgery.Patient stated an understanding.Patient appeared to be crying and stated frustration with finding out the time of surgery so close to the surgery itself.Patient stated that in today's time with people having to work it is hard to find a ride at the last minute.    Patient denies any side effects or issues with anesthesia or sedation.

## 2020-02-06 ENCOUNTER — ANESTHESIA (OUTPATIENT)
Dept: SURGERY | Facility: HOSPITAL | Age: 65
End: 2020-02-06
Payer: MEDICARE

## 2020-02-06 ENCOUNTER — HOSPITAL ENCOUNTER (OUTPATIENT)
Facility: HOSPITAL | Age: 65
Discharge: HOME | End: 2020-02-06
Attending: SURGERY | Admitting: SURGERY
Payer: MEDICARE

## 2020-02-06 ENCOUNTER — ANESTHESIA EVENT (OUTPATIENT)
Dept: SURGERY | Facility: HOSPITAL | Age: 65
End: 2020-02-06
Payer: MEDICARE

## 2020-02-06 VITALS
WEIGHT: 244 LBS | HEART RATE: 85 BPM | SYSTOLIC BLOOD PRESSURE: 156 MMHG | HEIGHT: 62 IN | DIASTOLIC BLOOD PRESSURE: 70 MMHG | TEMPERATURE: 98 F | RESPIRATION RATE: 12 BRPM | OXYGEN SATURATION: 96 % | BODY MASS INDEX: 44.9 KG/M2

## 2020-02-06 DIAGNOSIS — D17.20 LIPOMA OF SHOULDER: ICD-10-CM

## 2020-02-06 LAB
POCT GLUCOSE: 119 MG/DL (ref 70–110)
POCT GLUCOSE: 85 MG/DL (ref 70–110)

## 2020-02-06 PROCEDURE — 71000044 HC DOSC ROUTINE RECOVERY FIRST HOUR: Performed by: SURGERY

## 2020-02-06 PROCEDURE — 23071 EXC SHOULDER LES SC 3 CM/>: CPT | Mod: RT,,, | Performed by: SURGERY

## 2020-02-06 PROCEDURE — 37000009 HC ANESTHESIA EA ADD 15 MINS: Performed by: SURGERY

## 2020-02-06 PROCEDURE — D9220A PRA ANESTHESIA: ICD-10-PCS | Mod: CRNA,,, | Performed by: NURSE ANESTHETIST, CERTIFIED REGISTERED

## 2020-02-06 PROCEDURE — 88304 PR  SURG PATH,LEVEL III: ICD-10-PCS | Mod: 26,,, | Performed by: PATHOLOGY

## 2020-02-06 PROCEDURE — D9220A PRA ANESTHESIA: Mod: CRNA,,, | Performed by: NURSE ANESTHETIST, CERTIFIED REGISTERED

## 2020-02-06 PROCEDURE — 25000003 PHARM REV CODE 250: Performed by: NURSE ANESTHETIST, CERTIFIED REGISTERED

## 2020-02-06 PROCEDURE — 71000015 HC POSTOP RECOV 1ST HR: Performed by: SURGERY

## 2020-02-06 PROCEDURE — 37000008 HC ANESTHESIA 1ST 15 MINUTES: Performed by: SURGERY

## 2020-02-06 PROCEDURE — D9220A PRA ANESTHESIA: ICD-10-PCS | Mod: ANES,,, | Performed by: ANESTHESIOLOGY

## 2020-02-06 PROCEDURE — 82962 GLUCOSE BLOOD TEST: CPT | Performed by: SURGERY

## 2020-02-06 PROCEDURE — 63600175 PHARM REV CODE 636 W HCPCS: Performed by: PHYSICIAN ASSISTANT

## 2020-02-06 PROCEDURE — 25000003 PHARM REV CODE 250: Performed by: STUDENT IN AN ORGANIZED HEALTH CARE EDUCATION/TRAINING PROGRAM

## 2020-02-06 PROCEDURE — D9220A PRA ANESTHESIA: Mod: ANES,,, | Performed by: ANESTHESIOLOGY

## 2020-02-06 PROCEDURE — 88304 TISSUE EXAM BY PATHOLOGIST: CPT | Performed by: PATHOLOGY

## 2020-02-06 PROCEDURE — 36000706: Performed by: SURGERY

## 2020-02-06 PROCEDURE — 23071 PR EXCISION TUMOR SOFT TISSUE SHOULDER SUBQ 3+CM: ICD-10-PCS | Mod: RT,,, | Performed by: SURGERY

## 2020-02-06 PROCEDURE — 63600175 PHARM REV CODE 636 W HCPCS: Performed by: NURSE ANESTHETIST, CERTIFIED REGISTERED

## 2020-02-06 PROCEDURE — 36000707: Performed by: SURGERY

## 2020-02-06 PROCEDURE — 88304 TISSUE EXAM BY PATHOLOGIST: CPT | Mod: 26,,, | Performed by: PATHOLOGY

## 2020-02-06 PROCEDURE — 25000003 PHARM REV CODE 250: Performed by: SURGERY

## 2020-02-06 RX ORDER — OXYCODONE AND ACETAMINOPHEN 5; 325 MG/1; MG/1
2 TABLET ORAL EVERY 4 HOURS PRN
Qty: 20 TABLET | Refills: 0 | Status: SHIPPED | OUTPATIENT
Start: 2020-02-06 | End: 2020-02-06 | Stop reason: HOSPADM

## 2020-02-06 RX ORDER — FENTANYL CITRATE 50 UG/ML
INJECTION, SOLUTION INTRAMUSCULAR; INTRAVENOUS
Status: DISCONTINUED | OUTPATIENT
Start: 2020-02-06 | End: 2020-02-06

## 2020-02-06 RX ORDER — SODIUM CHLORIDE 0.9 % (FLUSH) 0.9 %
3 SYRINGE (ML) INJECTION
Status: DISCONTINUED | OUTPATIENT
Start: 2020-02-06 | End: 2020-02-06 | Stop reason: HOSPADM

## 2020-02-06 RX ORDER — HYDROMORPHONE HYDROCHLORIDE 1 MG/ML
0.2 INJECTION, SOLUTION INTRAMUSCULAR; INTRAVENOUS; SUBCUTANEOUS EVERY 5 MIN PRN
Status: DISCONTINUED | OUTPATIENT
Start: 2020-02-06 | End: 2020-02-06 | Stop reason: HOSPADM

## 2020-02-06 RX ORDER — GLYCOPYRROLATE 0.2 MG/ML
INJECTION INTRAMUSCULAR; INTRAVENOUS
Status: DISCONTINUED | OUTPATIENT
Start: 2020-02-06 | End: 2020-02-06

## 2020-02-06 RX ORDER — KETAMINE HCL IN 0.9 % NACL 50 MG/5 ML
SYRINGE (ML) INTRAVENOUS
Status: DISCONTINUED | OUTPATIENT
Start: 2020-02-06 | End: 2020-02-06

## 2020-02-06 RX ORDER — OXYCODONE HYDROCHLORIDE 5 MG/1
10 TABLET ORAL ONCE AS NEEDED
Status: COMPLETED | OUTPATIENT
Start: 2020-02-06 | End: 2020-02-06

## 2020-02-06 RX ORDER — MIDAZOLAM HYDROCHLORIDE 1 MG/ML
INJECTION, SOLUTION INTRAMUSCULAR; INTRAVENOUS
Status: DISCONTINUED | OUTPATIENT
Start: 2020-02-06 | End: 2020-02-06

## 2020-02-06 RX ORDER — PROPOFOL 10 MG/ML
VIAL (ML) INTRAVENOUS
Status: DISCONTINUED | OUTPATIENT
Start: 2020-02-06 | End: 2020-02-06

## 2020-02-06 RX ORDER — PROPOFOL 10 MG/ML
VIAL (ML) INTRAVENOUS CONTINUOUS PRN
Status: DISCONTINUED | OUTPATIENT
Start: 2020-02-06 | End: 2020-02-06

## 2020-02-06 RX ORDER — CEFAZOLIN SODIUM 1 G/3ML
2 INJECTION, POWDER, FOR SOLUTION INTRAMUSCULAR; INTRAVENOUS
Status: COMPLETED | OUTPATIENT
Start: 2020-02-06 | End: 2020-02-06

## 2020-02-06 RX ORDER — LIDOCAINE HYDROCHLORIDE 20 MG/ML
INJECTION INTRAVENOUS
Status: DISCONTINUED | OUTPATIENT
Start: 2020-02-06 | End: 2020-02-06

## 2020-02-06 RX ORDER — SODIUM CHLORIDE 9 MG/ML
INJECTION, SOLUTION INTRAVENOUS CONTINUOUS
Status: DISCONTINUED | OUTPATIENT
Start: 2020-02-06 | End: 2020-02-06 | Stop reason: HOSPADM

## 2020-02-06 RX ORDER — LIDOCAINE HYDROCHLORIDE 10 MG/ML
INJECTION INFILTRATION; PERINEURAL
Status: DISCONTINUED | OUTPATIENT
Start: 2020-02-06 | End: 2020-02-06 | Stop reason: HOSPADM

## 2020-02-06 RX ADMIN — SODIUM CHLORIDE: 0.9 INJECTION, SOLUTION INTRAVENOUS at 08:02

## 2020-02-06 RX ADMIN — CEFAZOLIN 2 G: 330 INJECTION, POWDER, FOR SOLUTION INTRAMUSCULAR; INTRAVENOUS at 08:02

## 2020-02-06 RX ADMIN — Medication 20 MG: at 08:02

## 2020-02-06 RX ADMIN — PROPOFOL 50 MG: 10 INJECTION, EMULSION INTRAVENOUS at 08:02

## 2020-02-06 RX ADMIN — GLYCOPYRROLATE 0.2 MG: 0.2 INJECTION, SOLUTION INTRAMUSCULAR; INTRAVENOUS at 08:02

## 2020-02-06 RX ADMIN — MIDAZOLAM HYDROCHLORIDE 2 MG: 1 INJECTION, SOLUTION INTRAMUSCULAR; INTRAVENOUS at 08:02

## 2020-02-06 RX ADMIN — FENTANYL CITRATE 25 MCG: 50 INJECTION, SOLUTION INTRAMUSCULAR; INTRAVENOUS at 09:02

## 2020-02-06 RX ADMIN — OXYCODONE HYDROCHLORIDE 10 MG: 5 TABLET ORAL at 10:02

## 2020-02-06 RX ADMIN — LIDOCAINE HYDROCHLORIDE 80 MG: 20 INJECTION, SOLUTION INTRAVENOUS at 08:02

## 2020-02-06 RX ADMIN — FENTANYL CITRATE 25 MCG: 50 INJECTION, SOLUTION INTRAMUSCULAR; INTRAVENOUS at 08:02

## 2020-02-06 RX ADMIN — PROPOFOL 100 MCG/KG/MIN: 10 INJECTION, EMULSION INTRAVENOUS at 08:02

## 2020-02-06 NOTE — INTERVAL H&P NOTE
The patient has been examined and the H&P has been reviewed:    I concur with the findings and no changes have occurred since H&P was written.    Anesthesia/Surgery risks, benefits and alternative options discussed and understood by patient/family.          Active Hospital Problems    Diagnosis  POA    Lipoma of shoulder [D17.20]  Yes      Resolved Hospital Problems   No resolved problems to display.

## 2020-02-06 NOTE — OP NOTE
[facility]  Surgery Department  Operative Note    SUMMARY     Date of Procedure: 2/6/2020     Procedure: Procedure(s) (LRB):  EXCISION, LIPOMA, right shoulder (Right) 9cm    Intermediate level wound closure(torso)  9 cm       Surgeon(s) and Role:     * Dick Ospina MD - Primary     * Paulo Gonzalez MD - Resident - Assisting        Pre-Operative Diagnosis: Shoulder mass [R22.30]    Post-Operative Diagnosis: Post-Op Diagnosis Codes:     * Shoulder mass [R22.30]    Anesthesia: Local MAC    Technical Procedures Used: 1.  Excision 9 cm right shoulder lipoma                                                      2.  9 cm intermediate level wound closure    Description of the Findings of the Procedure:  Following induction of adequate sedation, the patient's right shoulder was prepped and draped with ChloraPrep.  We then infiltrated local anesthesia and made a 9 cm incision over this large soft tissue mass.  We dissected down and determined to be a soft tissue mass consistent with lipoma.  We excised it from its attachments to the rectum surrounding tissue with cautery as well as sharp dissection.  We then removed the specimen and sent to pathology.  We then irrigated and obtained obtained hemostasis with cautery and then placed a closed suction drain in this large dead space.  We then closed the wound after obtaining hemostasis with interrupted Vicryl to the subcutaneous tissue and Monocryl to skin.  Sterile dressings were applied the procedure was terminated with the patient tolerating it well.    Significant Surgical Tasks Conducted by the Assistant(s), if Applicable:     Complications: No    Estimated Blood Loss (EBL):  10 mL           Implants: * No implants in log *    Specimens:   Specimen (12h ago, onward)    None                  Condition: Good    Disposition: PACU - hemodynamically stable.    Attestation: I was present and scrubbed for the entire procedure.

## 2020-02-06 NOTE — BRIEF OP NOTE
[facility]  Brief Operative Note    Surgery Date: 2/6/2020     Surgeon(s) and Role:     * Dick Ospina MD - Primary     * Paulo Gonzalez MD - Resident - Assisting    Pre-op Diagnosis:  Shoulder mass [R22.30]    Post-op Diagnosis:  Post-Op Diagnosis Codes:     * Shoulder mass [R22.30]    Procedure(s) (LRB):  EXCISION, LIPOMA, right shoulder (Right)    Anesthesia: Local MAC    Description of the findings of the procedure(s): 9 cm lipoma removed from right posterior shoulder.  KIANNA drain left in place.      Estimated Blood Loss: 10 mL         Specimens: Lipoma    Discharge Note    OUTCOME: Patient tolerated treatment/procedure well without complication and is now ready for discharge.    DISPOSITION: Home or Self Care    FINAL DIAGNOSIS:  Lipoma    FOLLOWUP: In clinic

## 2020-02-06 NOTE — DISCHARGE INSTRUCTIONS
Discharge Instructions: Caring for Your Mikael-Zamora Drainage Tube  Your doctor discharges you with a Mikael-Zamora drainage tube. Doctors commonly leave this drain within the abdominal cavity after surgery. It helps drain and collect blood and body fluid after surgery. This can prevent swelling and reduces the risk for infection. The tube is held in place by a few stitches. It is covered with a bandage. Your doctor will remove the drain when he or she determines you no longer need it.  Home care  · Dont sleep on the same side as the tube.  · Secure the tube and bag inside your clothing with a safety pin. This helps keep the tube from being pulled out.  · Empty your drain at least twice a day. Empty it more often if the drain is full. Wash  and dry your hands before emptying the drain.  ¨ Lift the opening on the drain.  ¨ Drain the fluid into a measuring cup.  ¨ Record the amount of fluid each time you empty the drain. Include the date and time it was emptied. Share this information with your doctor on your next visit.  ¨ Squeeze the bulb with your hands until you hear air coming out of the bulb if your doctor has instructed you to do so (sometimes the bulb is used as a reservoir without suction). Check with your doctor about specific drain instructions.  ¨ Close the opening.  · Change the dressing around the tube every day.  ¨ Wash your hands.  ¨ Remove the old bandage.  ¨ Wash your hands again.  ¨ Wet a cotton swab and clean the skin around the incision and tube site. Use normal saline solution (salt and water). Or, you can use warm, soapy water.  ¨ Put a new bandage on the incision and tube site. Make the bandage large enough to cover the whole incision area.  ¨ Tape the bandage in place.  · Keep the bandage and tube site dry when you shower. Ask your healthcare provider about the best way to do this.  · Stripping the tube helps keep blood clots from blocking the tube. Ask your nurse how often you should  strip the tube. Stripping may not be needed, depending on where and why your doctor placed the tube. It may even be dangerous in some cases.   ¨ Hold the tubing where it leaves the skin, with one hand. This keeps it from pulling on the skin.  ¨ Pinch the tubing with the thumb and first finger of your other hand.  ¨ Slowly and firmly pull your thumb and first finger down the tubing. You may find it helpful to hold an alcohol swab between your fingers and the tube to lubricate the tubing.  ¨ If the pulling hurts or feels like its coming out of the skin, stop. Begin again more gently.  Follow-up care  Make a follow-up appointment as directed by our staff.     When to seek medical care  Call your healthcare provider right away if you have any of the following:  · New or increased pain around the tube  · Redness, swelling, or warmth around the incision or tube  · Drainage that is foul-smelling  · Vomiting  · Fever of 100.4°F (38°C)  · Fluid leaking around the tube  · Incision seems not to be healing  · Stitches become loose  · Tube falls out or breaks  · Drainage that changes from light pink to dark red  · Blood clots in the drainage bulb  · A sudden increase or decrease in the amount of drainage (over 30 mL)   Date Last Reviewed: 2/1/2017  © 7705-4928 [employer]. [address] [place] [address], [place] [address]. All rights reserved. This information is not intended as a substitute for professional medical care. Always follow your healthcare professional's instructions.

## 2020-02-06 NOTE — PLAN OF CARE
Discharge instructions reviewed with pt. Understanding verbalized. Complaints of pain relieved with PRN medication. Pt able to tolerate po intake and urinate in restroom. To be transported to 7th floor per pt request to visit with mother who is currently and IP until  arrives to  pt.

## 2020-02-06 NOTE — ANESTHESIA PREPROCEDURE EVALUATION
02/06/2020  Pre-operative evaluation for Procedure(s) (LRB):  EXCISION, LIPOMA, right shoulder (Right)    Isabel Dennis is a 64 y.o. female w chronic pain syndrome (Nroco  QID, Soma TID), cervical spine fusion, DM2, obesity.     Patient Active Problem List   Diagnosis    Type 2 diabetes mellitus with diabetic cataract, without long-term current use of insulin    Hx of colonic polyps    Osteoarthritis of more than one site    Allergic rhinitis, seasonal    Depression with anxiety    GERD (gastroesophageal reflux disease)    DDD (degenerative disc disease), cervical    Morbid obesity    Dysphonia    Long term prescription opiate use    Patient is [place]    Constipation    Tattoos    Edema    Primary osteoarthritis of right knee    Status post total right knee replacement 5/31/2017    Chronic pain of right knee    Range of motion deficit    Abnormality of gait    Primary osteoarthritis of left knee    Insufficiency of tear film of both eyes    Thoracic aorta atherosclerosis    Dysphagia    Lipoma of shoulder       Review of patient's allergies indicates:   Allergen Reactions    Lisinopril Shortness Of Breath    Iodinated contrast media Hives and Rash     As per history has problem if has IV extravasation         No current facility-administered medications on file prior to encounter.      Current Outpatient Medications on File Prior to Encounter   Medication Sig Dispense Refill    amitriptyline (ELAVIL) 50 MG tablet Take 50 mg by mouth 2 (two) times daily as needed.       amLODIPine (NORVASC) 5 MG tablet TAKE ONE TABLET BY MOUTH ONCE DAILY 90 tablet 0    aspirin 81 MG Chew Take 81 mg by mouth once daily.      carisoprodol (SOMA) 350 MG tablet Take 350 mg by mouth.      furosemide (LASIX) 20 MG tablet Take 20 mg by mouth once daily.        "hydrocodone-acetaminophen 10-325mg (NORCO)  mg Tab Take 1 tablet by mouth every 4 to 6 hours as needed for Pain. 90 tablet 0    ibuprofen (ADVIL,MOTRIN) 800 MG tablet       metFORMIN (GLUCOPHAGE) 1000 MG tablet daily with breakfast.       FREESTYLE LITE STRIPS Strp       glimepiride (AMARYL) 4 MG tablet TAKE 1 TABLET (4 MG TOTAL) BY MOUTH BEFORE BREAKFAST. FOR DIABETES. (Patient not taking: Reported on 2020) 90 tablet 0       Past Surgical History:   Procedure Laterality Date    ADENOIDECTOMY      COLONOSCOPY      epiglottis      4 times    HYSTERECTOMY      Partial, fibroids    KNEE SURGERY      NECK SURGERY      SPINE SURGERY      TONSILLECTOMY      TOTAL KNEE ARTHROPLASTY Right 2017       Social History     Socioeconomic History    Marital status:      Spouse name: Not on file    Number of children: Not on file    Years of education: Not on file    Highest education level: Not on file   Occupational History     Employer: DISABLED    Social Needs    Financial resource strain: Not on file    Food insecurity:     Worry: Not on file     Inability: Not on file    Transportation needs:     Medical: Not on file     Non-medical: Not on file   Tobacco Use    Smoking status: Former Smoker     Packs/day: 1.00     Years: 20.00     Pack years: 20.00     Types: Cigarettes     Last attempt to quit: 2007     Years since quittin.0    Smokeless tobacco: Never Used   Substance and Sexual Activity    Alcohol use: Yes     Comment: "not enough to talk about "    Drug use: No    Sexual activity: Never   Lifestyle    Physical activity:     Days per week: Not on file     Minutes per session: Not on file    Stress: Not on file   Relationships    Social connections:     Talks on phone: Not on file     Gets together: Not on file     Attends [employer] service: Not on file     Active member of club or organization: Not on file     Attends meetings of clubs or organizations: Not on " file     Relationship status: Not on file   Other Topics Concern    Not on file   Social History Narrative    Disabled, 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         CBC: No results for input(s): WBC, RBC, HGB, HCT, PLT, MCV, MCH, MCHC in the last 72 hours.    CMP: No results for input(s): NA, K, CL, CO2, BUN, CREATININE, GLU, MG, PHOS, CALCIUM, ALBUMIN, PROT, ALKPHOS, ALT, AST, BILITOT in the last 72 hours.    INR  No results for input(s): PT, INR, PROTIME, APTT in the last 72 hours.        Diagnostic Studies:      EKD Echo:  No results found for this or any previous visit.      Anesthesia Evaluation    I have reviewed the Patient Summary Reports.     I have reviewed the Medications.     Review of Systems  Anesthesia Hx:  History of prior surgery of interest to airway management or planning: Denies Family Hx of Anesthesia complications.   Denies Personal Hx of Anesthesia complications.       Physical Exam  General:  Morbid Obesity    Airway/Jaw/Neck:  Airway Findings: Mouth Opening: Normal Tongue: Large  General Airway Assessment: Adult  Mallampati: III  Improves to II with phonation.  TM Distance: Normal, at least 6 cm  Jaw/Neck Findings:  Neck ROM: Normal ROM      Dental:  Dental Findings: Upper Dentures, Lower Dentures   Chest/Lungs:  Chest/Lungs Findings: Clear to auscultation, Normal Respiratory Rate         Mental Status:  Mental Status Findings:  Cooperative, Alert and Oriented         Anesthesia Plan  Type of Anesthesia, risks & benefits discussed:  Anesthesia Type:  general  Patient's Preference:   Intra-op Monitoring Plan: standard ASA monitors  Intra-op Monitoring Plan Comments:   Post Op Pain Control Plan: multimodal analgesia  Post Op Pain Control Plan Comments:   Induction:   IV  Beta Blocker:  Patient is not currently on a Beta-Blocker (No further documentation required).       Informed Consent: Patient understands risks and agrees with Anesthesia plan.  Questions answered. Anesthesia  consent signed with patient.  ASA Score: 3     Day of Surgery Review of History & Physical:    H&P update referred to the surgeon.     Anesthesia Plan Notes: Local/propofol infusion. LL decub positioning.         Ready For Surgery From Anesthesia Perspective.

## 2020-02-06 NOTE — PROGRESS NOTES
Dr. Benton is at the bedside assessing the patient; this writer is unable to pre-op the patient at this time. This writer informed him of K 5.3 in January, no new orders.

## 2020-02-06 NOTE — TRANSFER OF CARE
"Anesthesia Transfer of Care Note    Patient: Isabel Dennis    Procedure(s) Performed: Procedure(s) (LRB):  EXCISION, LIPOMA, right shoulder (Right)    Patient location: [facility]    Anesthesia Type: MAC    Transport from OR: Transported from OR on room air with adequate spontaneous ventilation    Post pain: adequate analgesia    Post assessment: no apparent anesthetic complications and tolerated procedure well    Post vital signs: stable    Level of consciousness: awake, alert and oriented    Nausea/Vomiting: no nausea/vomiting    Complications: none    Transfer of care protocol was followed      Last vitals:   Visit Vitals  BP (!) 148/69 (BP Location: Left arm, Patient Position: Lying)   Pulse 72   Temp 36.8 °C (98.2 °F) (Oral)   Resp 18   Ht 5' 2" (1.575 m)   Wt 110.7 kg (244 lb)   SpO2 99%   Breastfeeding? No   BMI 44.63 kg/m²     "

## 2020-02-06 NOTE — ANESTHESIA POSTPROCEDURE EVALUATION
Anesthesia Post Evaluation    Patient: Isabel Dennis    Procedure(s) Performed: Procedure(s) (LRB):  EXCISION, LIPOMA, right shoulder (Right)    Final Anesthesia Type: general    Patient location during evaluation: PACU  Patient participation: Yes- Able to Participate  Level of consciousness: awake and alert and oriented  Post-procedure vital signs: reviewed and stable  Pain management: adequate  Airway patency: patent  YON mitigation strategies: Intraoperative administration of CPAP, nasopharyngeal airway, or oral appliance during sedation and Multimodal analgesia  PONV status at discharge: No PONV  Anesthetic complications: no      Cardiovascular status: hemodynamically stable  Respiratory status: unassisted  Hydration status: euvolemic  Follow-up not needed.          Vitals Value Taken Time   /70 2/6/2020 10:45 AM   Temp 36.4 °C (97.5 °F) 2/6/2020 10:45 AM   Pulse 64 2/6/2020 10:55 AM   Resp 22 2/6/2020 10:55 AM   SpO2 99 % 2/6/2020 10:55 AM   Vitals shown include unvalidated device data.      No case tracking events are documented in the log.      Pain/Ivette Score: Pain Rating Prior to Med Admin: 10 (2/6/2020 10:15 AM)  Ivette Score: 10 (2/6/2020 11:25 AM)

## 2020-02-07 ENCOUNTER — TELEPHONE (OUTPATIENT)
Dept: PAIN MEDICINE | Facility: OTHER | Age: 65
End: 2020-02-07

## 2020-02-12 ENCOUNTER — OFFICE VISIT (OUTPATIENT)
Dept: SURGERY | Facility: CLINIC | Age: 65
End: 2020-02-12
Payer: MEDICARE

## 2020-02-12 VITALS
SYSTOLIC BLOOD PRESSURE: 141 MMHG | HEIGHT: 62 IN | BODY MASS INDEX: 44.46 KG/M2 | HEART RATE: 94 BPM | TEMPERATURE: 98 F | DIASTOLIC BLOOD PRESSURE: 79 MMHG | WEIGHT: 241.63 LBS

## 2020-02-12 DIAGNOSIS — D17.20 LIPOMA OF SHOULDER: Primary | ICD-10-CM

## 2020-02-12 PROCEDURE — 99999 PR PBB SHADOW E&M-EST. PATIENT-LVL III: CPT | Mod: PBBFAC,,, | Performed by: PHYSICIAN ASSISTANT

## 2020-02-12 PROCEDURE — 99024 PR POST-OP FOLLOW-UP VISIT: ICD-10-PCS | Mod: POP,,, | Performed by: PHYSICIAN ASSISTANT

## 2020-02-12 PROCEDURE — 99999 PR PBB SHADOW E&M-EST. PATIENT-LVL III: ICD-10-PCS | Mod: PBBFAC,,, | Performed by: PHYSICIAN ASSISTANT

## 2020-02-12 PROCEDURE — 99213 OFFICE O/P EST LOW 20 MIN: CPT | Mod: PBBFAC | Performed by: PHYSICIAN ASSISTANT

## 2020-02-12 PROCEDURE — 99024 POSTOP FOLLOW-UP VISIT: CPT | Mod: POP,,, | Performed by: PHYSICIAN ASSISTANT

## 2020-02-12 NOTE — PROGRESS NOTES
Isabel Dennis  64 y.o.    No diagnosis found.    SUBJECTIVE:  64 y.o.female presents s/p Excision of back/shoulder lipoma.  Patient reports that area is healing well.  Denies pain, drainage, fever or chills. States her KIANNA drain has been putting out <20mL/day.    OBJECTIVE:  Posterior shoulder - incision healing well, no erythema, no drainage, no tenderness with palpation.   KIANNA drain with minimal serosang drainage    Pathology- pending    ASSESSMENT:  65 yo female s/p posterior shoulder lipoma removal    PLAN:  KIANNA removed in clinic, patient tolerated it well  Patient doing well after removal  Return to clinic prn.

## 2020-02-13 ENCOUNTER — TELEPHONE (OUTPATIENT)
Dept: ORTHOPEDICS | Facility: CLINIC | Age: 65
End: 2020-02-13

## 2020-02-13 NOTE — TELEPHONE ENCOUNTER
----- Message from Laura Gomez sent at 2/13/2020  8:58 AM CST -----  Contact: self  Pt states she is ready to schedule surgery ask for a call    Contact info  [phone]                    Per Dr [facility]  I called her back   She has done well with lowering her BMI from  47.8  To 44.2  But ...  She has to be below 40  per [facility] guidelines  Plus   DR [facility] has decided to retire this July.... we are sending all of our patients to Dr Ezequiel Bruce  I offered to book her an appointment to get familiar with him    She said NO    She will find a Doctor on her own

## 2020-02-14 ENCOUNTER — CLINICAL SUPPORT (OUTPATIENT)
Dept: REHABILITATION | Facility: OTHER | Age: 65
End: 2020-02-14
Payer: MEDICARE

## 2020-02-14 DIAGNOSIS — Z78.9 IMPAIRED INSTRUMENTAL ACTIVITIES OF DAILY LIVING (IADL): ICD-10-CM

## 2020-02-14 DIAGNOSIS — Z78.9 DECREASED ACTIVITIES OF DAILY LIVING (ADL): ICD-10-CM

## 2020-02-14 DIAGNOSIS — R45.89 SYMPTOMS OF DEPRESSION: ICD-10-CM
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
## 2020-02-17 NOTE — PROGRESS NOTES
No Evaluation performed as pt is not appropriate for FRP. Recommend traditional Outpatient Therapy Setting.     MATT Liu  Occupational Therapy

## 2020-02-17 NOTE — PROGRESS NOTES
No visit was performed as patient was appropriate for FRP.  Recommend traditional PT.    Dain Martines, PT, DPT

## 2020-02-24 ENCOUNTER — PATIENT OUTREACH (OUTPATIENT)
Dept: ADMINISTRATIVE | Facility: HOSPITAL | Age: 65
End: 2020-02-24

## 2020-02-26 LAB — FINAL PATHOLOGIC DIAGNOSIS: NORMAL

## 2020-03-03 ENCOUNTER — TELEPHONE (OUTPATIENT)
Dept: ORTHOPEDICS | Facility: CLINIC | Age: 65
End: 2020-03-03

## 2020-03-03 NOTE — TELEPHONE ENCOUNTER
----- Message from Kimmy Lobo LPN sent at 3/3/2020 12:40 PM CST -----  Contact: self       ----- Message -----  From: Brittany Youssef  Sent: 3/3/2020  12:13 PM CST  To: [facility] Triage    She said she needs knee surgery, she asked if it was possible to get a referral from Dr. [facility].     Pt was unsure of the doctor she was referred to by a family friend.  She is asking for a call back to get help setting this appointment set up       We spoke      I booked her with Dr Bruce for April 23  Reminded her to get BMI under 40

## 2020-03-09 ENCOUNTER — OFFICE VISIT (OUTPATIENT)
Dept: INTERNAL MEDICINE | Facility: CLINIC | Age: 65
End: 2020-03-09
Payer: MEDICARE

## 2020-03-09 VITALS
HEART RATE: 83 BPM | DIASTOLIC BLOOD PRESSURE: 78 MMHG | BODY MASS INDEX: 45.02 KG/M2 | OXYGEN SATURATION: 98 % | SYSTOLIC BLOOD PRESSURE: 120 MMHG | HEIGHT: 62 IN | WEIGHT: 244.63 LBS

## 2020-03-09 DIAGNOSIS — Z98.890 STATUS POST EXCISION OF LIPOMA: ICD-10-CM

## 2020-03-09 DIAGNOSIS — Z79.891 LONG TERM PRESCRIPTION OPIATE USE: ICD-10-CM

## 2020-03-09 DIAGNOSIS — Z86.018 STATUS POST EXCISION OF LIPOMA: ICD-10-CM

## 2020-03-09 DIAGNOSIS — G89.4 CHRONIC PAIN SYNDROME: Primary | ICD-10-CM

## 2020-03-09 DIAGNOSIS — F41.8 DEPRESSION WITH ANXIETY: ICD-10-CM

## 2020-03-09 PROBLEM — D17.20 LIPOMA OF SHOULDER: Status: RESOLVED | Noted: 2020-02-06 | Resolved: 2020-03-09

## 2020-03-09 PROCEDURE — 99213 OFFICE O/P EST LOW 20 MIN: CPT | Mod: PBBFAC | Performed by: INTERNAL MEDICINE

## 2020-03-09 PROCEDURE — 99999 PR PBB SHADOW E&M-EST. PATIENT-LVL III: ICD-10-PCS | Mod: PBBFAC,,, | Performed by: INTERNAL MEDICINE

## 2020-03-09 PROCEDURE — 99215 PR OFFICE/OUTPT VISIT, EST, LEVL V, 40-54 MIN: ICD-10-PCS | Mod: S$PBB,,, | Performed by: INTERNAL MEDICINE

## 2020-03-09 PROCEDURE — 99215 OFFICE O/P EST HI 40 MIN: CPT | Mod: S$PBB,,, | Performed by: INTERNAL MEDICINE

## 2020-03-09 PROCEDURE — 99999 PR PBB SHADOW E&M-EST. PATIENT-LVL III: CPT | Mod: PBBFAC,,, | Performed by: INTERNAL MEDICINE

## 2020-03-09 RX ORDER — ESOMEPRAZOLE MAGNESIUM 40 MG/1
40 CAPSULE, DELAYED RELEASE ORAL DAILY
COMMUNITY
Start: 2020-01-17 | End: 2021-01-21 | Stop reason: CLARIF

## 2020-03-09 RX ORDER — MELOXICAM 7.5 MG/1
TABLET ORAL
COMMUNITY
Start: 2020-03-08 | End: 2021-01-21 | Stop reason: CLARIF
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
## 2020-03-25 ENCOUNTER — TELEPHONE (OUTPATIENT)
Dept: ORTHOPEDICS | Facility: CLINIC | Age: 65
End: 2020-03-25

## 2020-03-25 NOTE — TELEPHONE ENCOUNTER
I called the patient regarding her upcoming appointment with Dr. Bruce.  I informed the patient that per the national guidelines, Dr. Bruce's schedule is closed to new patients. I informed the patient that I will call her once we are allowed to schedule new patients with Dr. Bruce. The patient understands and has no further questions.

## 2020-04-01 ENCOUNTER — TELEPHONE (OUTPATIENT)
Dept: INTERNAL MEDICINE | Facility: CLINIC | Age: 65
End: 2020-04-01

## 2020-04-01 NOTE — TELEPHONE ENCOUNTER
----- Message from Blanca Gutierrez sent at 4/1/2020  3:15 PM CDT -----  Contact: Patient [phone]  Patient stated that she missed a call from you.    Please call and advise.    Thank You

## 2020-04-02 ENCOUNTER — TELEPHONE (OUTPATIENT)
Dept: ORTHOPEDICS | Facility: CLINIC | Age: 65
End: 2020-04-02

## 2020-04-02 NOTE — TELEPHONE ENCOUNTER
I called the patient today regarding their appointment I left a message for the patient to call me back. I left my name and phone number.

## 2020-04-23 ENCOUNTER — TELEPHONE (OUTPATIENT)
Dept: INTERNAL MEDICINE | Facility: CLINIC | Age: 65
End: 2020-04-23

## 2020-04-23 NOTE — TELEPHONE ENCOUNTER
She was just calling to let us know that her niece who is an RN is coming to get a coagulation test

## 2020-04-23 NOTE — TELEPHONE ENCOUNTER
----- Message from Tomasa Nuñez sent at 4/23/2020  8:23 AM CDT -----  Contact: Joan(daughter) [phone]  Daughter is requesting a call regarding coumadin. Please advise.

## 2020-05-13 ENCOUNTER — TELEPHONE (OUTPATIENT)
Dept: ORTHOPEDICS | Facility: CLINIC | Age: 65
End: 2020-05-13

## 2020-05-13 DIAGNOSIS — M25.561 RIGHT KNEE PAIN, UNSPECIFIED CHRONICITY: Primary | ICD-10-CM

## 2020-05-13 NOTE — TELEPHONE ENCOUNTER
I called the patient regarding re-scheduling her appointment with Dr. Bruce. Her original appointment was cancelled due to COVID-19 concerns. The patient is now scheduled 05/28/20. The patient verbalized understanding and has no further questions.     
Pt. also owns and uses rolling walker when needed. Pt. reports attending Outpatient PT.     Pt. left sitting at edge of bed, NAD, call yates in reach. KARMEN kidd.

## 2020-05-15 DIAGNOSIS — E11.9 TYPE 2 DIABETES MELLITUS WITHOUT COMPLICATION: ICD-10-CM

## 2020-05-26 ENCOUNTER — PATIENT OUTREACH (OUTPATIENT)
Dept: ADMINISTRATIVE | Facility: OTHER | Age: 65
End: 2020-05-26

## 2020-05-26 DIAGNOSIS — Z12.31 ENCOUNTER FOR SCREENING MAMMOGRAM FOR MALIGNANT NEOPLASM OF BREAST: Primary | ICD-10-CM

## 2020-05-26 NOTE — PROGRESS NOTES
Chart reviewed.   Immunizations: updated  Orders placed: Mammogram  Upcoming appts to satisfy YUSUF topics: n/a

## 2020-05-28 ENCOUNTER — TELEPHONE (OUTPATIENT)
Dept: ORTHOPEDICS | Facility: CLINIC | Age: 65
End: 2020-05-28

## 2020-07-06 ENCOUNTER — OFFICE VISIT (OUTPATIENT)
Dept: INTERNAL MEDICINE | Facility: CLINIC | Age: 65
End: 2020-07-06
Payer: MEDICARE

## 2020-07-06 DIAGNOSIS — G58.9 PINCHED NERVE: Primary | ICD-10-CM

## 2020-07-06 PROCEDURE — 99499 UNLISTED E&M SERVICE: CPT | Mod: 95,POP,, | Performed by: INTERNAL MEDICINE

## 2020-07-06 PROCEDURE — 99499 NO LOS: ICD-10-PCS | Mod: 95,POP,, | Performed by: INTERNAL MEDICINE

## 2020-07-06 NOTE — PROGRESS NOTES
Established Patient - Audio Only Telehealth Visit     The patient location is: ?  The chief complaint leading to consultation is: ?  Visit type: Virtual visit with audio only (telephone)  Total time spent with patient: 0- Left VM       The reason for the audio only service rather than synchronous audio and video virtual visit was related to technical difficulties or patient preference/necessity.     Each patient to whom I provide medical services by telemedicine is:  (1) informed of the relationship between the physician and patient and the respective role of any other health care provider with respect to management of the patient; and (2) notified that they may decline to receive medical services by telemedicine and may withdraw from such care at any time. Patient verbally consented to receive this service via voice-only telephone call.   checked 7/6/20 06/19/2020  1   06/11/2020  Hydrocodone-Acetamin  Mg  120.00 30 [place]  [id number]  Bra (5414)  0/0 40.00 [place]   05/22/2020  1   05/11/2020  Hydrocodone-Acetamin  Mg  120.00 30 [place]  [id number]  Bra (5414)  0/[place] 40.[place]   04/23/2020  2   04/09/2020  Hydrocodone-Acetamin  Mg  120.00 30 [place]  [id number]  Win (0656)  0/[place] 40.[place]   03/23/2020  2   03/11/2020  Hydrocodone-Acetamin  Mg  120.00 30 [place]  [id number]  Win (0656)  0/0 40.00 [place]   Patient's last Soma fill by her controlled substance provider was 2/14/20.    HPI:       Assessment and plan:     Attempted contact patient had scheduled appointment time.  Left voicemail identify myself and asked her to return call.                     This service was not originating from a related E/M service provided within the previous 7 days nor will  to an E/M service or procedure within the next 24 hours or my soonest available appointment.  Prevailing standard of care was able to be met in this audio-only visit.

## 2020-07-15 DIAGNOSIS — Z71.89 COMPLEX CARE COORDINATION: ICD-10-CM

## 2020-09-01 ENCOUNTER — PATIENT OUTREACH (OUTPATIENT)
Dept: ADMINISTRATIVE | Facility: OTHER | Age: 65
End: 2020-09-01

## 2020-09-01 ENCOUNTER — OFFICE VISIT (OUTPATIENT)
Dept: INTERNAL MEDICINE | Facility: CLINIC | Age: 65
End: 2020-09-01
Payer: MEDICARE

## 2020-09-01 ENCOUNTER — LAB VISIT (OUTPATIENT)
Dept: LAB | Facility: HOSPITAL | Age: 65
End: 2020-09-01
Attending: INTERNAL MEDICINE
Payer: MEDICARE

## 2020-09-01 VITALS
BODY MASS INDEX: 43.79 KG/M2 | SYSTOLIC BLOOD PRESSURE: 122 MMHG | DIASTOLIC BLOOD PRESSURE: 82 MMHG | WEIGHT: 238 LBS | HEIGHT: 62 IN

## 2020-09-01 DIAGNOSIS — G89.29 CHRONIC LOW BACK PAIN WITHOUT SCIATICA, UNSPECIFIED BACK PAIN LATERALITY: ICD-10-CM

## 2020-09-01 DIAGNOSIS — E11.36 TYPE 2 DIABETES MELLITUS WITH DIABETIC CATARACT, WITHOUT LONG-TERM CURRENT USE OF INSULIN: ICD-10-CM

## 2020-09-01 DIAGNOSIS — M54.50 CHRONIC LOW BACK PAIN WITHOUT SCIATICA, UNSPECIFIED BACK PAIN LATERALITY: ICD-10-CM

## 2020-09-01 DIAGNOSIS — M54.50 CHRONIC LOW BACK PAIN WITHOUT SCIATICA, UNSPECIFIED BACK PAIN LATERALITY: Primary | ICD-10-CM

## 2020-09-01 DIAGNOSIS — Z98.1 HISTORY OF FUSION OF CERVICAL SPINE: ICD-10-CM

## 2020-09-01 DIAGNOSIS — G89.29 CHRONIC LOW BACK PAIN WITHOUT SCIATICA, UNSPECIFIED BACK PAIN LATERALITY: Primary | ICD-10-CM

## 2020-09-01 LAB
BASOPHILS # BLD AUTO: 0.05 K/UL (ref 0–0.2)
BASOPHILS NFR BLD: 0.9 % (ref 0–1.9)
DIFFERENTIAL METHOD: ABNORMAL
EOSINOPHIL # BLD AUTO: 0.1 K/UL (ref 0–0.5)
EOSINOPHIL NFR BLD: 1.8 % (ref 0–8)
ERYTHROCYTE [DISTWIDTH] IN BLOOD BY AUTOMATED COUNT: 14.9 % (ref 11.5–14.5)
HCT VFR BLD AUTO: 43.1 % (ref 37–48.5)
HGB BLD-MCNC: 12.6 G/DL (ref 12–16)
IMM GRANULOCYTES # BLD AUTO: 0.01 K/UL (ref 0–0.04)
IMM GRANULOCYTES NFR BLD AUTO: 0.2 % (ref 0–0.5)
LYMPHOCYTES # BLD AUTO: 2.7 K/UL (ref 1–4.8)
LYMPHOCYTES NFR BLD: 47.4 % (ref 18–48)
MCH RBC QN AUTO: 25.7 PG (ref 27–31)
MCHC RBC AUTO-ENTMCNC: 29.2 G/DL (ref 32–36)
MCV RBC AUTO: 88 FL (ref 82–98)
MONOCYTES # BLD AUTO: 0.5 K/UL (ref 0.3–1)
MONOCYTES NFR BLD: 8.8 % (ref 4–15)
NEUTROPHILS # BLD AUTO: 2.3 K/UL (ref 1.8–7.7)
NEUTROPHILS NFR BLD: 40.9 % (ref 38–73)
NRBC BLD-RTO: 0 /100 WBC
PLATELET # BLD AUTO: 300 K/UL (ref 150–350)
PMV BLD AUTO: 9.9 FL (ref 9.2–12.9)
RBC # BLD AUTO: 4.9 M/UL (ref 4–5.4)
WBC # BLD AUTO: 5.65 K/UL (ref 3.9–12.7)

## 2020-09-01 PROCEDURE — 99215 PR OFFICE/OUTPT VISIT, EST, LEVL V, 40-54 MIN: ICD-10-PCS | Mod: S$PBB,,, | Performed by: INTERNAL MEDICINE

## 2020-09-01 PROCEDURE — 80053 COMPREHEN METABOLIC PANEL: CPT

## 2020-09-01 PROCEDURE — 36415 COLL VENOUS BLD VENIPUNCTURE: CPT

## 2020-09-01 PROCEDURE — 99215 OFFICE O/P EST HI 40 MIN: CPT | Mod: S$PBB,,, | Performed by: INTERNAL MEDICINE

## 2020-09-01 PROCEDURE — 85025 COMPLETE CBC W/AUTO DIFF WBC: CPT

## 2020-09-01 PROCEDURE — 99999 PR PBB SHADOW E&M-EST. PATIENT-LVL III: CPT | Mod: PBBFAC,,, | Performed by: INTERNAL MEDICINE

## 2020-09-01 PROCEDURE — 99213 OFFICE O/P EST LOW 20 MIN: CPT | Mod: PBBFAC | Performed by: INTERNAL MEDICINE

## 2020-09-01 PROCEDURE — 99999 PR PBB SHADOW E&M-EST. PATIENT-LVL III: ICD-10-PCS | Mod: PBBFAC,,, | Performed by: INTERNAL MEDICINE

## 2020-09-01 PROCEDURE — 83036 HEMOGLOBIN GLYCOSYLATED A1C: CPT

## 2020-09-01 RX ORDER — CYCLOBENZAPRINE HCL 10 MG
10 TABLET ORAL 3 TIMES DAILY PRN
Qty: 90 TABLET | Refills: 1 | Status: SHIPPED | OUTPATIENT
Start: 2020-09-01 | End: 2020-09-11

## 2020-09-01 NOTE — PROGRESS NOTES
CHIEF COMPLAINT     Chief Complaint   Patient presents with    Follow-up     3 month F/U visit.      checked during today's visit  08/17/2020  1   08/13/2020  Hydrocodone-Acetamin  Mg  120.00 30 [place]  [id number]  Bra ([id number])  0/[place] 40.[place]   07/17/2020  1   07/13/2020  Hydrocodone-Acetamin  Mg  120.00 30 [place]  [id number]  Bra (5414)  [place]   06/19/2020  1   06/11/2020  Hydrocodone-Acetamin  Mg  120.00 30 [place]  [id number]  Bra (5414)  [place]   05/22/2020  1   05/11/2020  Hydrocodone-Acetamin  Mg  120.00 30 [place]  [id number]  Bra (5414)  [place]   04/23/2020  2   04/09/2020  Hydrocodone-Acetamin  Mg  120.00 30 [place]  [id number]  Win (0656[place]  [place]   03/23/2020  2   03/11/2020  Hydrocodone-Acetamin  Mg  120.00 30 [place]  [id number]  Win (0656)  [place]       HPI     Isabel Dennis is a 65 y.o. female here today for below    Of note patient was seen in the emergency room [id number].  ED provider note personally reviewed notable for the following:  Patient had mechanical fall in hit head on parking meter.  Unclear loss of consciousness.  Patient underwent CT head showing small soft tissue swelling but no intracranial bleeding.  CT neck notable for ACDF at C5-6 and C6-7 no acute fracture.  Patient was given Flexeril and ibuprofen and discharged home.    Reports continued to have neck pain.  Reports having daily constant symptoms are bothersome.  Currently treated with hydrocodone 10 from Dr. Causey at [facility].  Reports medication helps with her symptoms but she has to take multiple times a day.  Not interested in injection.  Interested in getting back into therapy.  However concern for COVID.     She thinks that her pain is not being appropriately treated because she is black and white doctors do not care if she hurts.    Also having pain left knee.  Personally Reviewed Patient's Medical,  "surgical, family and social hx. Changes updated in Epic.  Patient has enjoyed having her grandson home for the summer.  He is going home to [place] next week.  Daughter with breast lump will be having a biopsy done in [place].  She plans on driving out to be with her during the testing.  Patient very Yarsanism and has good support system through her [employer]  Care Team updated in Epic    Review of Systems:  Review of Systems   Respiratory: Negative for cough and shortness of breath.    Cardiovascular: Negative for palpitations.   Musculoskeletal: Positive for arthralgias, back pain and neck pain.   Neurological: Positive for headaches.       Health Maintenance:   Reviewed with patient  Due for the following:  Deferred    PHYSICAL EXAM     /82 (BP Location: Left arm, Patient Position: Sitting, BP Method: Large (Manual))   Ht 5' 2" (1.575 m)   Wt 108 kg (238 lb)   BMI 43.53 kg/m²     Gen: Well Appearing, NAD obese  HEENT: PERR, EOMI, healing abrasion over left eyebrow  Neck: FROM,   Chest Normal work of breathing,   MSK: no LE edema  Left knee in Ace bandage tender palpation distal quadriceps  Neuro: A&Ox3, gait normal, speech normal  Mood; Mood normal, affect depressed behavior normal, thought process linear       LABS     Labs reviewed;  Lab Results   Component Value Date    CREATININE 0.9 01/14/2020    BUN 16 01/14/2020     (L) 01/14/2020    K 5.3 (H) 01/14/2020     01/14/2020    CO2 23 01/14/2020     Lab Results   Component Value Date    HGBA1C 6.3 (H) 01/14/2020         ASSESSMENT     1. myofascial back pain  cyclobenzaprine (FLEXERIL) 10 MG tablet    CBC auto differential   2. Type 2 diabetes mellitus with diabetic cataract, without long-term current use of insulin  Comprehensive metabolic panel    Hemoglobin A1C   3. History of fusion of cervical spine  Ambulatory referral/consult to [facility]           Plan     Isabel Dennis is a 65 y.o. female with type 2 diabetes, " hypertension history of chronic neck pain status post fusion status post right knee replacement.  Patient spent majority of visit reading Bible verses aloud during our visit.  Her mood and movement seem to improve while she was reading.  It was wonderful to see her mood improve.  Not sure I would gotten to see that side of her under other setting.  She gets a lot of support and fulfillment from her [employer] community.    1. myofascial back pain  Today majority for myofascial back pain is her neck  Reports was exacerbated or recent fall.  Her opioid prescribing physician recommended that she be re-evaluated by Orthopedics.  The most recent CT scan did not show any abnormality with her previous surgery.  Think she would benefit from multimodal treatment including physical therapy, behavioral therapy and medical therapy  Patient not interested in injection or surgery at this point time.  Continue opioid per her prescriber.  - cyclobenzaprine (FLEXERIL) 10 MG tablet; Take 1 tablet (10 mg total) by mouth 3 (three) times daily as needed for Muscle spasms.  Dispense: 90 tablet; Refill: 1  - CBC auto differential; Future    2. Type 2 diabetes mellitus with diabetic cataract, without long-term current use of insulin  Will recheck a1c,  Continue metformin  Will try and address secondary screening next visit.  - Comprehensive metabolic panel; Future  - Hemoglobin A1C; Future    3. History of fusion of cervical spine  Referral back to back and spine for help with cervical neck pain.  - Ambulatory referral/consult to [facility]; Future    25/40 minutes of  face to face visit spent counseling and coordinating care with patient.        Mehrdad Olguin MD

## 2020-09-01 NOTE — PROGRESS NOTES
Chart was reviewed for overdue [facility]active [facility] Encounters ([facility])  topics  Updates were requested from care everywhere  Health Maintenance has been updated  LINKS immunization registry triggered

## 2020-09-02 LAB
ALBUMIN SERPL BCP-MCNC: 4.6 G/DL (ref 3.5–5.2)
ALP SERPL-CCNC: 44 U/L (ref 55–135)
ALT SERPL W/O P-5'-P-CCNC: 18 U/L (ref 10–44)
ANION GAP SERPL CALC-SCNC: 11 MMOL/L (ref 8–16)
AST SERPL-CCNC: 23 U/L (ref 10–40)
BILIRUB SERPL-MCNC: 0.3 MG/DL (ref 0.1–1)
BUN SERPL-MCNC: 18 MG/DL (ref 8–23)
CALCIUM SERPL-MCNC: 9.9 MG/DL (ref 8.7–10.5)
CHLORIDE SERPL-SCNC: 103 MMOL/L (ref 95–110)
CO2 SERPL-SCNC: 23 MMOL/L (ref 23–29)
CREAT SERPL-MCNC: 0.8 MG/DL (ref 0.5–1.4)
EST. GFR  (AFRICAN AMERICAN): >60 ML/MIN/1.73 M^2
EST. GFR  (NON AFRICAN AMERICAN): >60 ML/MIN/1.73 M^2
ESTIMATED AVG GLUCOSE: 143 MG/DL (ref 68–131)
GLUCOSE SERPL-MCNC: 79 MG/DL (ref 70–110)
HBA1C MFR BLD HPLC: 6.6 % (ref 4–5.6)
POTASSIUM SERPL-SCNC: 4.1 MMOL/L (ref 3.5–5.1)
PROT SERPL-MCNC: 8.2 G/DL (ref 6–8.4)
SODIUM SERPL-SCNC: 137 MMOL/L (ref 136–145)

## 2020-09-03 ENCOUNTER — TELEPHONE (OUTPATIENT)
Dept: INTERNAL MEDICINE | Facility: CLINIC | Age: 65
End: 2020-09-03

## 2020-09-03 NOTE — TELEPHONE ENCOUNTER
----- Message from Mehrdad Olguin MD sent at 9/3/2020  7:37 AM CDT -----  A1c is 6.6. Still at goal <7.  Keep up the good work.  Liver and kidney labs are fine. Blood counts look good as well.    Mehrdad Olguin

## 2020-10-14 ENCOUNTER — PATIENT OUTREACH (OUTPATIENT)
Dept: ADMINISTRATIVE | Facility: OTHER | Age: 65
End: 2020-10-14

## 2020-12-16 DIAGNOSIS — E11.9 TYPE 2 DIABETES MELLITUS WITHOUT COMPLICATION: ICD-10-CM

## 2021-01-21 ENCOUNTER — HOSPITAL ENCOUNTER (EMERGENCY)
Facility: HOSPITAL | Age: 66
Discharge: HOME | End: 2021-01-21
Attending: EMERGENCY MEDICINE
Payer: MEDICARE

## 2021-01-21 VITALS
HEART RATE: 91 BPM | DIASTOLIC BLOOD PRESSURE: 78 MMHG | SYSTOLIC BLOOD PRESSURE: 129 MMHG | TEMPERATURE: 99 F | BODY MASS INDEX: 45.56 KG/M2 | OXYGEN SATURATION: 97 % | WEIGHT: 247.56 LBS | HEIGHT: 62 IN | RESPIRATION RATE: 18 BRPM

## 2021-01-21 DIAGNOSIS — T14.8XXA WOUND INFECTION: ICD-10-CM

## 2021-01-21 DIAGNOSIS — L08.9 WOUND INFECTION: ICD-10-CM

## 2021-01-21 DIAGNOSIS — S81.811A LACERATION OF RIGHT LOWER EXTREMITY, INITIAL ENCOUNTER: Primary | ICD-10-CM

## 2021-01-21 PROCEDURE — 90715 TDAP VACCINE 7 YRS/> IM: CPT | Performed by: EMERGENCY MEDICINE

## 2021-01-21 PROCEDURE — 99284 PR EMERGENCY DEPT VISIT,LEVEL IV: ICD-10-PCS | Mod: ,,, | Performed by: EMERGENCY MEDICINE

## 2021-01-21 PROCEDURE — 99284 EMERGENCY DEPT VISIT MOD MDM: CPT | Mod: ,,, | Performed by: EMERGENCY MEDICINE

## 2021-01-21 PROCEDURE — 99283 EMERGENCY DEPT VISIT LOW MDM: CPT | Mod: 25

## 2021-01-21 PROCEDURE — 90471 IMMUNIZATION ADMIN: CPT | Performed by: EMERGENCY MEDICINE

## 2021-01-21 PROCEDURE — 63600175 PHARM REV CODE 636 W HCPCS: Performed by: EMERGENCY MEDICINE

## 2021-01-21 RX ORDER — SULFAMETHOXAZOLE AND TRIMETHOPRIM 800; 160 MG/1; MG/1
1 TABLET ORAL 2 TIMES DAILY
Qty: 10 TABLET | Refills: 0 | Status: SHIPPED | OUTPATIENT
Start: 2021-01-21 | End: 2021-01-26

## 2021-01-21 RX ORDER — CYCLOBENZAPRINE HCL 10 MG
10 TABLET ORAL 3 TIMES DAILY PRN
COMMUNITY
End: 2021-03-20 | Stop reason: SDUPTHER

## 2021-01-21 RX ORDER — SULFAMETHOXAZOLE AND TRIMETHOPRIM 800; 160 MG/1; MG/1
1 TABLET ORAL 2 TIMES DAILY
Qty: 14 TABLET | Refills: 0 | Status: SHIPPED | OUTPATIENT
Start: 2021-01-21 | End: 2021-01-21 | Stop reason: SDUPTHER

## 2021-01-21 RX ADMIN — CLOSTRIDIUM TETANI TOXOID ANTIGEN (FORMALDEHYDE INACTIVATED), CORYNEBACTERIUM DIPHTHERIAE TOXOID ANTIGEN (FORMALDEHYDE INACTIVATED), BORDETELLA PERTUSSIS TOXOID ANTIGEN (GLUTARALDEHYDE INACTIVATED), BORDETELLA PERTUSSIS FILAMENTOUS HEMAGGLUTININ ANTIGEN (FORMALDEHYDE INACTIVATED), BORDETELLA PERTUSSIS PERTACTIN ANTIGEN, AND BORDETELLA PERTUSSIS FIMBRIAE 2/3 ANTIGEN 0.5 ML: 5; 2; 2.5; 5; 3; 5 INJECTION, SUSPENSION INTRAMUSCULAR at 01:01

## 2021-03-17 DIAGNOSIS — E11.9 TYPE 2 DIABETES MELLITUS WITHOUT COMPLICATION: ICD-10-CM

## 2021-04-05 ENCOUNTER — PATIENT MESSAGE (OUTPATIENT)
Dept: ADMINISTRATIVE | Facility: HOSPITAL | Age: 66
End: 2021-04-05

## 2021-05-12 ENCOUNTER — TELEPHONE (OUTPATIENT)
Dept: INTERNAL MEDICINE | Facility: CLINIC | Age: 66
End: 2021-05-12

## 2021-05-12 ENCOUNTER — PATIENT OUTREACH (OUTPATIENT)
Dept: ADMINISTRATIVE | Facility: OTHER | Age: 66
End: 2021-05-12

## 2021-05-12 DIAGNOSIS — E11.36 TYPE 2 DIABETES MELLITUS WITH DIABETIC CATARACT, WITHOUT LONG-TERM CURRENT USE OF INSULIN: Primary | ICD-10-CM

## 2021-05-12 DIAGNOSIS — M17.12 PRIMARY OSTEOARTHRITIS OF LEFT KNEE: Primary | ICD-10-CM

## 2021-05-13 ENCOUNTER — HOSPITAL ENCOUNTER (OUTPATIENT)
Dept: RADIOLOGY | Facility: HOSPITAL | Age: 66
Discharge: HOME | End: 2021-05-13
Attending: ORTHOPAEDIC SURGERY
Payer: MEDICARE

## 2021-05-13 ENCOUNTER — OFFICE VISIT (OUTPATIENT)
Dept: ORTHOPEDICS | Facility: CLINIC | Age: 66
End: 2021-05-13
Payer: MEDICARE

## 2021-05-13 VITALS — BODY MASS INDEX: 44.44 KG/M2 | WEIGHT: 241.5 LBS | HEIGHT: 62 IN

## 2021-05-13 DIAGNOSIS — Z01.818 PRE-OP TESTING: ICD-10-CM

## 2021-05-13 DIAGNOSIS — M17.12 PRIMARY OSTEOARTHRITIS OF LEFT KNEE: Primary | ICD-10-CM

## 2021-05-13 DIAGNOSIS — M17.12 PRIMARY OSTEOARTHRITIS OF LEFT KNEE: ICD-10-CM

## 2021-05-13 PROCEDURE — 99999 PR PBB SHADOW E&M-EST. PATIENT-LVL III: CPT | Mod: PBBFAC,,, | Performed by: ORTHOPAEDIC SURGERY

## 2021-05-13 PROCEDURE — 73564 X-RAY EXAM KNEE 4 OR MORE: CPT | Mod: 26,LT,, | Performed by: RADIOLOGY

## 2021-05-13 PROCEDURE — 73562 XR KNEE ORTHO LEFT WITH FLEXION: ICD-10-PCS | Mod: 26,RT,, | Performed by: RADIOLOGY

## 2021-05-13 PROCEDURE — 73564 X-RAY EXAM KNEE 4 OR MORE: CPT | Mod: TC,LT

## 2021-05-13 PROCEDURE — 99999 PR PBB SHADOW E&M-EST. PATIENT-LVL III: ICD-10-PCS | Mod: PBBFAC,,, | Performed by: ORTHOPAEDIC SURGERY

## 2021-05-13 PROCEDURE — 99215 PR OFFICE/OUTPT VISIT, EST, LEVL V, 40-54 MIN: ICD-10-PCS | Mod: S$PBB,,, | Performed by: ORTHOPAEDIC SURGERY

## 2021-05-13 PROCEDURE — 73562 X-RAY EXAM OF KNEE 3: CPT | Mod: 26,RT,, | Performed by: RADIOLOGY

## 2021-05-13 PROCEDURE — 99215 OFFICE O/P EST HI 40 MIN: CPT | Mod: S$PBB,,, | Performed by: ORTHOPAEDIC SURGERY

## 2021-05-13 PROCEDURE — 73564 XR KNEE ORTHO LEFT WITH FLEXION: ICD-10-PCS | Mod: 26,LT,, | Performed by: RADIOLOGY

## 2021-05-13 PROCEDURE — 99213 OFFICE O/P EST LOW 20 MIN: CPT | Mod: PBBFAC,25 | Performed by: ORTHOPAEDIC SURGERY

## 2021-05-14 DIAGNOSIS — M17.12 PRIMARY OSTEOARTHRITIS OF LEFT KNEE: Primary | ICD-10-CM

## 2021-06-08 DIAGNOSIS — Z01.818 PRE-OP TESTING: Primary | ICD-10-CM

## 2021-06-08 DIAGNOSIS — E08.65 DIABETES MELLITUS DUE TO UNDERLYING CONDITION WITH HYPERGLYCEMIA, WITHOUT LONG-TERM CURRENT USE OF INSULIN: ICD-10-CM

## 2021-06-08 DIAGNOSIS — M79.605 PAIN OF LEFT LOWER EXTREMITY: ICD-10-CM

## 2021-06-09 ENCOUNTER — TELEPHONE (OUTPATIENT)
Dept: PREADMISSION TESTING | Facility: HOSPITAL | Age: 66
End: 2021-06-09

## 2021-06-15 ENCOUNTER — PATIENT OUTREACH (OUTPATIENT)
Dept: ADMINISTRATIVE | Facility: OTHER | Age: 66
End: 2021-06-15

## 2021-06-15 ENCOUNTER — TELEPHONE (OUTPATIENT)
Dept: INTERNAL MEDICINE | Facility: CLINIC | Age: 66
End: 2021-06-15

## 2021-06-15 ENCOUNTER — LAB VISIT (OUTPATIENT)
Dept: LAB | Facility: HOSPITAL | Age: 66
End: 2021-06-15
Attending: INTERNAL MEDICINE
Payer: MEDICARE

## 2021-06-15 ENCOUNTER — OFFICE VISIT (OUTPATIENT)
Dept: INTERNAL MEDICINE | Facility: CLINIC | Age: 66
End: 2021-06-15
Payer: MEDICARE

## 2021-06-15 ENCOUNTER — OFFICE VISIT (OUTPATIENT)
Dept: OTOLARYNGOLOGY | Facility: CLINIC | Age: 66
End: 2021-06-15
Payer: MEDICARE

## 2021-06-15 VITALS
BODY MASS INDEX: 45.97 KG/M2 | SYSTOLIC BLOOD PRESSURE: 130 MMHG | OXYGEN SATURATION: 99 % | DIASTOLIC BLOOD PRESSURE: 78 MMHG | WEIGHT: 249.81 LBS | HEART RATE: 87 BPM | HEIGHT: 62 IN

## 2021-06-15 VITALS
SYSTOLIC BLOOD PRESSURE: 130 MMHG | HEART RATE: 87 BPM | DIASTOLIC BLOOD PRESSURE: 78 MMHG | WEIGHT: 245.38 LBS | BODY MASS INDEX: 44.88 KG/M2

## 2021-06-15 DIAGNOSIS — E11.36 TYPE 2 DIABETES MELLITUS WITH DIABETIC CATARACT, WITHOUT LONG-TERM CURRENT USE OF INSULIN: ICD-10-CM

## 2021-06-15 DIAGNOSIS — Z63.8 STRESS DUE TO FAMILY TENSION: ICD-10-CM

## 2021-06-15 DIAGNOSIS — Z98.890 CHRONIC NECK PAIN WITH HISTORY OF CERVICAL SPINAL SURGERY: ICD-10-CM

## 2021-06-15 DIAGNOSIS — E11.9 TYPE 2 DIABETES MELLITUS WITHOUT COMPLICATION: ICD-10-CM

## 2021-06-15 DIAGNOSIS — E11.36 TYPE 2 DIABETES MELLITUS WITH DIABETIC CATARACT, WITHOUT LONG-TERM CURRENT USE OF INSULIN: Primary | ICD-10-CM

## 2021-06-15 DIAGNOSIS — Z11.4 SCREENING FOR HIV (HUMAN IMMUNODEFICIENCY VIRUS): ICD-10-CM

## 2021-06-15 DIAGNOSIS — R49.0 DYSPHONIA: ICD-10-CM

## 2021-06-15 DIAGNOSIS — I10 ESSENTIAL HYPERTENSION, BENIGN: ICD-10-CM

## 2021-06-15 DIAGNOSIS — M17.12 PRIMARY OSTEOARTHRITIS OF LEFT KNEE: ICD-10-CM

## 2021-06-15 DIAGNOSIS — M54.2 CHRONIC NECK PAIN WITH HISTORY OF CERVICAL SPINAL SURGERY: ICD-10-CM

## 2021-06-15 DIAGNOSIS — G89.28 CHRONIC NECK PAIN WITH HISTORY OF CERVICAL SPINAL SURGERY: ICD-10-CM

## 2021-06-15 LAB
ALBUMIN SERPL BCP-MCNC: 4.5 G/DL (ref 3.5–5.2)
ALP SERPL-CCNC: 46 U/L (ref 55–135)
ALT SERPL W/O P-5'-P-CCNC: 20 U/L (ref 10–44)
ANION GAP SERPL CALC-SCNC: 12 MMOL/L (ref 8–16)
AST SERPL-CCNC: 24 U/L (ref 10–40)
BILIRUB SERPL-MCNC: 0.2 MG/DL (ref 0.1–1)
BUN SERPL-MCNC: 16 MG/DL (ref 8–23)
CALCIUM SERPL-MCNC: 10.1 MG/DL (ref 8.7–10.5)
CHLORIDE SERPL-SCNC: 103 MMOL/L (ref 95–110)
CHOLEST SERPL-MCNC: 221 MG/DL (ref 120–199)
CHOLEST/HDLC SERPL: 3.2 {RATIO} (ref 2–5)
CO2 SERPL-SCNC: 22 MMOL/L (ref 23–29)
CREAT SERPL-MCNC: 0.9 MG/DL (ref 0.5–1.4)
EST. GFR  (AFRICAN AMERICAN): >60 ML/MIN/1.73 M^2
EST. GFR  (NON AFRICAN AMERICAN): >60 ML/MIN/1.73 M^2
GLUCOSE SERPL-MCNC: 102 MG/DL (ref 70–110)
HDLC SERPL-MCNC: 69 MG/DL (ref 40–75)
HDLC SERPL: 31.2 % (ref 20–50)
LDLC SERPL CALC-MCNC: 129.8 MG/DL (ref 63–159)
NONHDLC SERPL-MCNC: 152 MG/DL
POTASSIUM SERPL-SCNC: 4.2 MMOL/L (ref 3.5–5.1)
PROT SERPL-MCNC: 7.9 G/DL (ref 6–8.4)
SODIUM SERPL-SCNC: 137 MMOL/L (ref 136–145)
TRIGL SERPL-MCNC: 111 MG/DL (ref 30–150)

## 2021-06-15 PROCEDURE — 99213 PR OFFICE/OUTPT VISIT, EST, LEVL III, 20-29 MIN: ICD-10-PCS | Mod: 25,S$PBB,, | Performed by: OTOLARYNGOLOGY

## 2021-06-15 PROCEDURE — 99215 PR OFFICE/OUTPT VISIT, EST, LEVL V, 40-54 MIN: ICD-10-PCS | Mod: S$PBB,,, | Performed by: INTERNAL MEDICINE

## 2021-06-15 PROCEDURE — 99212 OFFICE O/P EST SF 10 MIN: CPT | Mod: PBBFAC,27,25 | Performed by: OTOLARYNGOLOGY

## 2021-06-15 PROCEDURE — 99999 PR PBB SHADOW E&M-EST. PATIENT-LVL II: ICD-10-PCS | Mod: PBBFAC,,, | Performed by: OTOLARYNGOLOGY

## 2021-06-15 PROCEDURE — 86703 HIV-1/HIV-2 1 RESULT ANTBDY: CPT | Performed by: INTERNAL MEDICINE

## 2021-06-15 PROCEDURE — 31575 PR LARYNGOSCOPY, FLEXIBLE; DIAGNOSTIC: ICD-10-PCS | Mod: S$PBB,,, | Performed by: OTOLARYNGOLOGY

## 2021-06-15 PROCEDURE — 99215 OFFICE O/P EST HI 40 MIN: CPT | Mod: PBBFAC | Performed by: INTERNAL MEDICINE

## 2021-06-15 PROCEDURE — 99999 PR PBB SHADOW E&M-EST. PATIENT-LVL V: ICD-10-PCS | Mod: PBBFAC,,, | Performed by: INTERNAL MEDICINE

## 2021-06-15 PROCEDURE — 31575 DIAGNOSTIC LARYNGOSCOPY: CPT | Mod: S$PBB,,, | Performed by: OTOLARYNGOLOGY

## 2021-06-15 PROCEDURE — 80053 COMPREHEN METABOLIC PANEL: CPT | Performed by: INTERNAL MEDICINE

## 2021-06-15 PROCEDURE — 99999 PR PBB SHADOW E&M-EST. PATIENT-LVL V: CPT | Mod: PBBFAC,,, | Performed by: INTERNAL MEDICINE

## 2021-06-15 PROCEDURE — 80061 LIPID PANEL: CPT | Performed by: INTERNAL MEDICINE

## 2021-06-15 PROCEDURE — 36415 COLL VENOUS BLD VENIPUNCTURE: CPT | Performed by: INTERNAL MEDICINE

## 2021-06-15 PROCEDURE — 31575 DIAGNOSTIC LARYNGOSCOPY: CPT | Mod: PBBFAC | Performed by: OTOLARYNGOLOGY

## 2021-06-15 PROCEDURE — 99999 PR PBB SHADOW E&M-EST. PATIENT-LVL II: CPT | Mod: PBBFAC,,, | Performed by: OTOLARYNGOLOGY

## 2021-06-15 PROCEDURE — 99213 OFFICE O/P EST LOW 20 MIN: CPT | Mod: 25,S$PBB,, | Performed by: OTOLARYNGOLOGY

## 2021-06-15 PROCEDURE — 99215 OFFICE O/P EST HI 40 MIN: CPT | Mod: S$PBB,,, | Performed by: INTERNAL MEDICINE

## 2021-06-15 RX ORDER — LINACLOTIDE 145 UG/1
145 CAPSULE, GELATIN COATED ORAL EVERY MORNING
COMMUNITY
Start: 2021-06-07 | End: 2021-06-15 | Stop reason: SDUPTHER

## 2021-06-15 RX ORDER — TRAZODONE HYDROCHLORIDE 50 MG/1
TABLET ORAL
COMMUNITY
Start: 2021-06-08 | End: 2021-06-15 | Stop reason: SDUPTHER

## 2021-06-15 RX ORDER — ESOMEPRAZOLE MAGNESIUM 40 MG/1
40 CAPSULE, DELAYED RELEASE ORAL
COMMUNITY
Start: 2020-06-23 | End: 2021-06-15

## 2021-06-15 RX ORDER — CYCLOBENZAPRINE HCL 10 MG
10 TABLET ORAL 3 TIMES DAILY PRN
Qty: 90 TABLET | Refills: 3 | Status: SHIPPED | OUTPATIENT
Start: 2021-06-15 | End: 2023-06-16 | Stop reason: SDUPTHER

## 2021-06-15 RX ORDER — INSULIN HUMAN 100 [IU]/ML
INJECTION, SUSPENSION SUBCUTANEOUS
COMMUNITY
Start: 2021-05-21

## 2021-06-15 RX ORDER — ESCITALOPRAM OXALATE 5 MG/1
TABLET ORAL
COMMUNITY
Start: 2021-02-09 | End: 2022-01-12

## 2021-06-15 RX ORDER — TRAZODONE HYDROCHLORIDE 50 MG/1
TABLET ORAL
COMMUNITY
Start: 2021-06-08 | End: 2022-01-12

## 2021-06-15 RX ORDER — POTASSIUM CHLORIDE 1.5 G/1.58G
POWDER, FOR SOLUTION ORAL
COMMUNITY
Start: 2020-11-17

## 2021-06-15 RX ORDER — BLOOD-GLUCOSE CONTROL, NORMAL
1 EACH MISCELLANEOUS
COMMUNITY
Start: 2021-06-07

## 2021-06-15 RX ORDER — FLUCONAZOLE 150 MG/1
TABLET ORAL
COMMUNITY
Start: 2021-05-04 | End: 2021-06-15

## 2021-06-15 RX ORDER — BLOOD-GLUCOSE METER
EACH MISCELLANEOUS
COMMUNITY
Start: 2021-04-09

## 2021-06-15 RX ORDER — CYCLOBENZAPRINE HCL 10 MG
TABLET ORAL
COMMUNITY
Start: 2020-09-01 | End: 2021-06-15 | Stop reason: SDUPTHER

## 2021-06-15 RX ORDER — LANCETS 33 GAUGE
EACH MISCELLANEOUS
COMMUNITY
Start: 2021-06-07

## 2021-06-15 RX ORDER — GABAPENTIN 300 MG/1
CAPSULE ORAL
COMMUNITY
Start: 2021-06-08 | End: 2021-06-15 | Stop reason: SDUPTHER

## 2021-06-15 RX ORDER — SULFAMETHOXAZOLE AND TRIMETHOPRIM 800; 160 MG/1; MG/1
1 TABLET ORAL 2 TIMES DAILY
COMMUNITY
Start: 2021-05-04 | End: 2021-06-15

## 2021-06-15 RX ORDER — INSULIN PUMP SYRINGE, 3 ML
EACH MISCELLANEOUS
COMMUNITY
Start: 2021-04-09

## 2021-06-15 RX ORDER — GABAPENTIN 300 MG/1
CAPSULE ORAL
COMMUNITY
Start: 2021-06-08 | End: 2021-06-15

## 2021-06-15 SDOH — SOCIAL DETERMINANTS OF HEALTH (SDOH): OTHER SPECIFIED PROBLEMS RELATED TO PRIMARY SUPPORT GROUP: Z63.8

## 2021-06-16 LAB — HIV 1+2 AB+HIV1 P24 AG SERPL QL IA: NEGATIVE

## 2021-06-17 ENCOUNTER — OFFICE VISIT (OUTPATIENT)
Dept: ORTHOPEDICS | Facility: CLINIC | Age: 66
End: 2021-06-17
Payer: MEDICARE

## 2021-06-17 ENCOUNTER — PATIENT MESSAGE (OUTPATIENT)
Dept: ADMINISTRATIVE | Facility: OTHER | Age: 66
End: 2021-06-17

## 2021-06-17 ENCOUNTER — HOSPITAL ENCOUNTER (OUTPATIENT)
Dept: RADIOLOGY | Facility: HOSPITAL | Age: 66
Discharge: HOME | End: 2021-06-17
Attending: NURSE PRACTITIONER
Payer: MEDICARE

## 2021-06-17 ENCOUNTER — TELEPHONE (OUTPATIENT)
Dept: ORTHOPEDICS | Facility: CLINIC | Age: 66
End: 2021-06-17

## 2021-06-17 VITALS — HEIGHT: 62 IN | BODY MASS INDEX: 45.64 KG/M2 | WEIGHT: 248 LBS

## 2021-06-17 DIAGNOSIS — M17.12 OSTEOARTHRITIS OF LEFT KNEE, UNSPECIFIED OSTEOARTHRITIS TYPE: ICD-10-CM

## 2021-06-17 DIAGNOSIS — M17.12 OSTEOARTHRITIS OF LEFT KNEE, UNSPECIFIED OSTEOARTHRITIS TYPE: Primary | ICD-10-CM

## 2021-06-17 PROCEDURE — 73560 X-RAY EXAM OF KNEE 1 OR 2: CPT | Mod: TC,LT

## 2021-06-17 PROCEDURE — 73560 X-RAY EXAM OF KNEE 1 OR 2: CPT | Mod: 26,LT,, | Performed by: RADIOLOGY

## 2021-06-17 PROCEDURE — 99999 PR PBB SHADOW E&M-EST. PATIENT-LVL IV: CPT | Mod: PBBFAC,,, | Performed by: NURSE PRACTITIONER

## 2021-06-17 PROCEDURE — 99999 PR PBB SHADOW E&M-EST. PATIENT-LVL IV: ICD-10-PCS | Mod: PBBFAC,,, | Performed by: NURSE PRACTITIONER

## 2021-06-17 PROCEDURE — 73560 XR KNEE 1 OR 2 VIEW LEFT: ICD-10-PCS | Mod: 26,LT,, | Performed by: RADIOLOGY

## 2021-06-17 PROCEDURE — 99499 NO LOS: ICD-10-PCS | Mod: S$PBB,,, | Performed by: NURSE PRACTITIONER

## 2021-06-17 PROCEDURE — 99214 OFFICE O/P EST MOD 30 MIN: CPT | Mod: PBBFAC,25 | Performed by: NURSE PRACTITIONER

## 2021-06-17 PROCEDURE — 99499 UNLISTED E&M SERVICE: CPT | Mod: S$PBB,,, | Performed by: NURSE PRACTITIONER

## 2021-06-17 RX ORDER — MIDAZOLAM HYDROCHLORIDE 1 MG/ML
1 INJECTION INTRAMUSCULAR; INTRAVENOUS EVERY 5 MIN PRN
Status: CANCELLED | OUTPATIENT
Start: 2021-06-17

## 2021-06-17 RX ORDER — NALOXONE HCL 0.4 MG/ML
0.02 VIAL (ML) INJECTION
Status: CANCELLED | OUTPATIENT
Start: 2021-06-17

## 2021-06-17 RX ORDER — POLYETHYLENE GLYCOL 3350 17 G/17G
17 POWDER, FOR SOLUTION ORAL DAILY
Status: CANCELLED | OUTPATIENT
Start: 2021-06-18

## 2021-06-17 RX ORDER — CEFAZOLIN SODIUM 2 G/50ML
2 SOLUTION INTRAVENOUS
Status: CANCELLED | OUTPATIENT
Start: 2021-06-17

## 2021-06-17 RX ORDER — CELECOXIB 200 MG/1
400 CAPSULE ORAL
Status: CANCELLED | OUTPATIENT
Start: 2021-06-17

## 2021-06-17 RX ORDER — PROCHLORPERAZINE EDISYLATE 5 MG/ML
5 INJECTION INTRAMUSCULAR; INTRAVENOUS EVERY 6 HOURS PRN
Status: CANCELLED | OUTPATIENT
Start: 2021-06-17

## 2021-06-17 RX ORDER — PREGABALIN 75 MG/1
75 CAPSULE ORAL NIGHTLY
Status: CANCELLED | OUTPATIENT
Start: 2021-06-17

## 2021-06-17 RX ORDER — CELECOXIB 200 MG/1
200 CAPSULE ORAL DAILY
Status: CANCELLED | OUTPATIENT
Start: 2021-06-18

## 2021-06-17 RX ORDER — MUPIROCIN 20 MG/G
1 OINTMENT TOPICAL
Status: CANCELLED | OUTPATIENT
Start: 2021-06-17

## 2021-06-17 RX ORDER — AMOXICILLIN 250 MG
1 CAPSULE ORAL 2 TIMES DAILY
Status: CANCELLED | OUTPATIENT
Start: 2021-06-17

## 2021-06-17 RX ORDER — PREGABALIN 75 MG/1
75 CAPSULE ORAL
Status: CANCELLED | OUTPATIENT
Start: 2021-06-17

## 2021-06-17 RX ORDER — MUPIROCIN 20 MG/G
1 OINTMENT TOPICAL 2 TIMES DAILY
Status: CANCELLED | OUTPATIENT
Start: 2021-06-17 | End: 2021-06-22

## 2021-06-17 RX ORDER — SODIUM CHLORIDE 0.9 % (FLUSH) 0.9 %
10 SYRINGE (ML) INJECTION
Status: CANCELLED | OUTPATIENT
Start: 2021-06-17

## 2021-06-17 RX ORDER — MORPHINE SULFATE 2 MG/ML
2 INJECTION, SOLUTION INTRAMUSCULAR; INTRAVENOUS
Status: CANCELLED | OUTPATIENT
Start: 2021-06-17

## 2021-06-17 RX ORDER — FENTANYL CITRATE 50 UG/ML
25 INJECTION, SOLUTION INTRAMUSCULAR; INTRAVENOUS EVERY 5 MIN PRN
Status: CANCELLED | OUTPATIENT
Start: 2021-06-17

## 2021-06-17 RX ORDER — TALC
6 POWDER (GRAM) TOPICAL NIGHTLY PRN
Status: CANCELLED | OUTPATIENT
Start: 2021-06-17

## 2021-06-17 RX ORDER — ACETAMINOPHEN 500 MG
1000 TABLET ORAL EVERY 6 HOURS
Status: CANCELLED | OUTPATIENT
Start: 2021-06-17 | End: 2021-06-19

## 2021-06-17 RX ORDER — BISACODYL 10 MG
10 SUPPOSITORY, RECTAL RECTAL EVERY 12 HOURS PRN
Status: CANCELLED | OUTPATIENT
Start: 2021-06-17

## 2021-06-17 RX ORDER — ASPIRIN 81 MG/1
81 TABLET ORAL 2 TIMES DAILY
Status: CANCELLED | OUTPATIENT
Start: 2021-06-17

## 2021-06-17 RX ORDER — ONDANSETRON 2 MG/ML
4 INJECTION INTRAMUSCULAR; INTRAVENOUS EVERY 8 HOURS PRN
Status: CANCELLED | OUTPATIENT
Start: 2021-06-17

## 2021-06-17 RX ORDER — CEFAZOLIN SODIUM 2 G/50ML
2 SOLUTION INTRAVENOUS
Status: CANCELLED | OUTPATIENT
Start: 2021-06-17 | End: 2021-06-18

## 2021-06-17 RX ORDER — ROPIVACAINE HYDROCHLORIDE 2 MG/ML
8 INJECTION, SOLUTION EPIDURAL; INFILTRATION; PERINEURAL CONTINUOUS
Status: CANCELLED | OUTPATIENT
Start: 2021-06-17

## 2021-06-17 RX ORDER — ACETAMINOPHEN 500 MG
1000 TABLET ORAL
Status: CANCELLED | OUTPATIENT
Start: 2021-06-17

## 2021-06-17 RX ORDER — OXYCODONE HYDROCHLORIDE 5 MG/1
10 TABLET ORAL
Status: CANCELLED | OUTPATIENT
Start: 2021-06-17

## 2021-06-17 RX ORDER — SODIUM CHLORIDE 9 MG/ML
INJECTION, SOLUTION INTRAVENOUS
Status: CANCELLED | OUTPATIENT
Start: 2021-06-17

## 2021-06-17 RX ORDER — SODIUM CHLORIDE 9 MG/ML
INJECTION, SOLUTION INTRAVENOUS CONTINUOUS
Status: CANCELLED | OUTPATIENT
Start: 2021-06-17 | End: 2021-06-18

## 2021-06-17 RX ORDER — LIDOCAINE HYDROCHLORIDE 10 MG/ML
1 INJECTION, SOLUTION EPIDURAL; INFILTRATION; INTRACAUDAL; PERINEURAL
Status: CANCELLED | OUTPATIENT
Start: 2021-06-17

## 2021-06-17 RX ORDER — OXYCODONE HYDROCHLORIDE 5 MG/1
5 TABLET ORAL
Status: CANCELLED | OUTPATIENT
Start: 2021-06-17

## 2021-06-17 RX ORDER — FAMOTIDINE 20 MG/1
20 TABLET, FILM COATED ORAL 2 TIMES DAILY
Status: CANCELLED | OUTPATIENT
Start: 2021-06-17

## 2021-06-22 ENCOUNTER — OFFICE VISIT (OUTPATIENT)
Dept: INTERNAL MEDICINE | Facility: CLINIC | Age: 66
End: 2021-06-22
Payer: MEDICARE

## 2021-06-22 ENCOUNTER — HOSPITAL ENCOUNTER (OUTPATIENT)
Dept: CARDIOLOGY | Facility: CLINIC | Age: 66
Discharge: HOME | End: 2021-06-22
Payer: MEDICARE

## 2021-06-22 VITALS
HEART RATE: 96 BPM | TEMPERATURE: 98 F | DIASTOLIC BLOOD PRESSURE: 72 MMHG | SYSTOLIC BLOOD PRESSURE: 125 MMHG | HEIGHT: 62 IN | WEIGHT: 245 LBS | BODY MASS INDEX: 45.08 KG/M2 | OXYGEN SATURATION: 98 %

## 2021-06-22 DIAGNOSIS — M17.10 PRIMARY OSTEOARTHRITIS OF KNEE, UNSPECIFIED LATERALITY: Primary | ICD-10-CM

## 2021-06-22 DIAGNOSIS — E66.01 CLASS 3 SEVERE OBESITY WITH SERIOUS COMORBIDITY AND BODY MASS INDEX (BMI) OF 40.0 TO 44.9 IN ADULT, UNSPECIFIED OBESITY TYPE: ICD-10-CM

## 2021-06-22 DIAGNOSIS — I10 ESSENTIAL HYPERTENSION: ICD-10-CM

## 2021-06-22 DIAGNOSIS — R60.9 EDEMA, UNSPECIFIED TYPE: ICD-10-CM

## 2021-06-22 DIAGNOSIS — M17.12 PRIMARY OSTEOARTHRITIS OF LEFT KNEE: ICD-10-CM

## 2021-06-22 DIAGNOSIS — I70.0 THORACIC AORTA ATHEROSCLEROSIS: ICD-10-CM

## 2021-06-22 DIAGNOSIS — J45.40 MODERATE PERSISTENT ASTHMA, UNSPECIFIED WHETHER COMPLICATED: ICD-10-CM

## 2021-06-22 DIAGNOSIS — K21.9 GASTROESOPHAGEAL REFLUX DISEASE, UNSPECIFIED WHETHER ESOPHAGITIS PRESENT: ICD-10-CM

## 2021-06-22 DIAGNOSIS — F41.8 DEPRESSION WITH ANXIETY: ICD-10-CM

## 2021-06-22 DIAGNOSIS — Z01.818 PRE-OP TESTING: ICD-10-CM

## 2021-06-22 DIAGNOSIS — K59.03 DRUG-INDUCED CONSTIPATION: ICD-10-CM

## 2021-06-22 DIAGNOSIS — M50.30 DDD (DEGENERATIVE DISC DISEASE), CERVICAL: ICD-10-CM

## 2021-06-22 DIAGNOSIS — G89.29 OTHER CHRONIC PAIN: ICD-10-CM

## 2021-06-22 DIAGNOSIS — R01.1 HEART MURMUR: Primary | ICD-10-CM

## 2021-06-22 DIAGNOSIS — E11.36 TYPE 2 DIABETES MELLITUS WITH DIABETIC CATARACT, WITHOUT LONG-TERM CURRENT USE OF INSULIN: ICD-10-CM

## 2021-06-22 DIAGNOSIS — Z86.73 PERSONAL HISTORY OF TIA (TRANSIENT ISCHEMIC ATTACK): ICD-10-CM

## 2021-06-22 DIAGNOSIS — F11.20 CHRONIC NARCOTIC DEPENDENCE: ICD-10-CM

## 2021-06-22 DIAGNOSIS — Z79.891 LONG TERM PRESCRIPTION OPIATE USE: ICD-10-CM

## 2021-06-22 DIAGNOSIS — R49.0 DYSPHONIA: ICD-10-CM

## 2021-06-22 PROBLEM — J45.909 MODERATE ASTHMA: Status: ACTIVE | Noted: 2021-06-22

## 2021-06-22 PROCEDURE — 99214 OFFICE O/P EST MOD 30 MIN: CPT | Mod: S$PBB,,, | Performed by: NURSE PRACTITIONER

## 2021-06-22 PROCEDURE — 93010 EKG 12-LEAD: ICD-10-PCS | Mod: S$PBB,,, | Performed by: INTERNAL MEDICINE

## 2021-06-22 PROCEDURE — 99999 PR PBB SHADOW E&M-EST. PATIENT-LVL V: CPT | Mod: PBBFAC,,, | Performed by: NURSE PRACTITIONER

## 2021-06-22 PROCEDURE — 93010 ELECTROCARDIOGRAM REPORT: CPT | Mod: S$PBB,,, | Performed by: INTERNAL MEDICINE

## 2021-06-22 PROCEDURE — 93005 ELECTROCARDIOGRAM TRACING: CPT | Mod: PBBFAC | Performed by: INTERNAL MEDICINE

## 2021-06-22 PROCEDURE — 99999 PR PBB SHADOW E&M-EST. PATIENT-LVL V: ICD-10-PCS | Mod: PBBFAC,,, | Performed by: NURSE PRACTITIONER

## 2021-06-22 PROCEDURE — 99215 OFFICE O/P EST HI 40 MIN: CPT | Mod: PBBFAC,25 | Performed by: NURSE PRACTITIONER

## 2021-06-22 PROCEDURE — 99214 PR OFFICE/OUTPT VISIT, EST, LEVL IV, 30-39 MIN: ICD-10-PCS | Mod: S$PBB,,, | Performed by: NURSE PRACTITIONER

## 2021-06-22 RX ORDER — ASPIRIN 325 MG
325 TABLET ORAL DAILY
COMMUNITY

## 2021-06-23 DIAGNOSIS — E11.36 TYPE 2 DIABETES MELLITUS WITH DIABETIC CATARACT, WITHOUT LONG-TERM CURRENT USE OF INSULIN: ICD-10-CM

## 2021-06-24 ENCOUNTER — TELEPHONE (OUTPATIENT)
Dept: ORTHOPEDICS | Facility: CLINIC | Age: 66
End: 2021-06-24

## 2021-06-24 ENCOUNTER — HOSPITAL ENCOUNTER (OUTPATIENT)
Dept: CARDIOLOGY | Facility: OTHER | Age: 66
Discharge: HOME | End: 2021-06-24
Attending: NURSE PRACTITIONER
Payer: MEDICARE

## 2021-06-24 VITALS
HEIGHT: 62 IN | DIASTOLIC BLOOD PRESSURE: 72 MMHG | WEIGHT: 245 LBS | BODY MASS INDEX: 45.08 KG/M2 | SYSTOLIC BLOOD PRESSURE: 125 MMHG

## 2021-06-24 DIAGNOSIS — R01.1 HEART MURMUR: ICD-10-CM

## 2021-06-24 LAB
ASCENDING AORTA: 2.7 CM
AV INDEX (PROSTH): 0.53
AV MEAN GRADIENT: 8 MMHG
AV PEAK GRADIENT: 17 MMHG
AV VALVE AREA: 1.81 CM2
AV VELOCITY RATIO: 0.41
BSA FOR ECHO PROCEDURE: 2.2 M2
CV ECHO LV RWT: 0.46 CM
DOP CALC AO PEAK VEL: 2.08 M/S
DOP CALC AO VTI: 31.51 CM
DOP CALC LVOT AREA: 3.4 CM2
DOP CALC LVOT DIAMETER: 2.08 CM
DOP CALC LVOT PEAK VEL: 0.86 M/S
DOP CALC LVOT STROKE VOLUME: 56.89 CM3
DOP CALCLVOT PEAK VEL VTI: 16.75 CM
E WAVE DECELERATION TIME: 287.78 MSEC
E/A RATIO: 0.56
E/E' RATIO: 10 M/S
ECHO LV POSTERIOR WALL: 0.97 CM (ref 0.6–1.1)
EJECTION FRACTION: 57 %
FRACTIONAL SHORTENING: 30 % (ref 28–44)
INTERVENTRICULAR SEPTUM: 1.02 CM (ref 0.6–1.1)
IVRT: 169.55 MSEC
LA MAJOR: 5.1 CM
LA MINOR: 5.76 CM
LA WIDTH: 3.03 CM
LEFT ATRIUM SIZE: 3.62 CM
LEFT ATRIUM VOLUME INDEX MOD: 25 ML/M2
LEFT ATRIUM VOLUME INDEX: 24.2 ML/M2
LEFT ATRIUM VOLUME MOD: 52 CM3
LEFT ATRIUM VOLUME: 50.44 CM3
LEFT INTERNAL DIMENSION IN SYSTOLE: 2.99 CM (ref 2.1–4)
LEFT VENTRICLE DIASTOLIC VOLUME INDEX: 38.77 ML/M2
LEFT VENTRICLE DIASTOLIC VOLUME: 80.65 ML
LEFT VENTRICLE MASS INDEX: 67 G/M2
LEFT VENTRICLE SYSTOLIC VOLUME INDEX: 16.7 ML/M2
LEFT VENTRICLE SYSTOLIC VOLUME: 34.67 ML
LEFT VENTRICULAR INTERNAL DIMENSION IN DIASTOLE: 4.25 CM (ref 3.5–6)
LEFT VENTRICULAR MASS: 138.88 G
LV LATERAL E/E' RATIO: 8.33 M/S
LV SEPTAL E/E' RATIO: 12.5 M/S
MV PEAK A VEL: 0.9 M/S
MV PEAK E VEL: 0.5 M/S
MV STENOSIS PRESSURE HALF TIME: 83.46 MS
MV VALVE AREA P 1/2 METHOD: 2.64 CM2
PISA TR MAX VEL: 1.95 M/S
PV PEAK VELOCITY: 1.06 CM/S
RA MAJOR: 3.85 CM
RA PRESSURE: 3 MMHG
RIGHT VENTRICULAR END-DIASTOLIC DIMENSION: 2.99 CM
RV TISSUE DOPPLER FREE WALL SYSTOLIC VELOCITY 1 (APICAL 4 CHAMBER VIEW): 17.6 CM/S
SINUS: 2.94 CM
STJ: 2.79 CM
TDI LATERAL: 0.06 M/S
TDI SEPTAL: 0.04 M/S
TDI: 0.05 M/S
TR MAX PG: 15 MMHG
TRICUSPID ANNULAR PLANE SYSTOLIC EXCURSION: 2.1 CM
TV REST PULMONARY ARTERY PRESSURE: 18 MMHG

## 2021-06-24 PROCEDURE — 93306 ECHO (CUPID ONLY): ICD-10-PCS | Mod: 26,,, | Performed by: INTERNAL MEDICINE

## 2021-06-24 PROCEDURE — 93306 TTE W/DOPPLER COMPLETE: CPT

## 2021-06-24 PROCEDURE — 93306 TTE W/DOPPLER COMPLETE: CPT | Mod: 26,,, | Performed by: INTERNAL MEDICINE

## 2021-06-25 ENCOUNTER — TELEPHONE (OUTPATIENT)
Dept: INTERNAL MEDICINE | Facility: CLINIC | Age: 66
End: 2021-06-25

## 2021-07-01 ENCOUNTER — TELEPHONE (OUTPATIENT)
Dept: INTERNAL MEDICINE | Facility: CLINIC | Age: 66
End: 2021-07-01

## 2021-07-01 ENCOUNTER — PATIENT MESSAGE (OUTPATIENT)
Dept: INTERNAL MEDICINE | Facility: CLINIC | Age: 66
End: 2021-07-01

## 2021-07-07 ENCOUNTER — PATIENT MESSAGE (OUTPATIENT)
Dept: ADMINISTRATIVE | Facility: HOSPITAL | Age: 66
End: 2021-07-07

## 2021-07-12 ENCOUNTER — TELEPHONE (OUTPATIENT)
Dept: INTERNAL MEDICINE | Facility: CLINIC | Age: 66
End: 2021-07-12

## 2021-09-13 ENCOUNTER — TELEPHONE (OUTPATIENT)
Dept: INTERNAL MEDICINE | Facility: CLINIC | Age: 66
End: 2021-09-13

## 2021-09-13 ENCOUNTER — DOCUMENTATION ONLY (OUTPATIENT)
Dept: INTERNAL MEDICINE | Facility: CLINIC | Age: 66
End: 2021-09-13

## 2021-09-29 ENCOUNTER — PES CALL (OUTPATIENT)
Dept: ADMINISTRATIVE | Facility: CLINIC | Age: 66
End: 2021-09-29

## 2021-10-04 ENCOUNTER — PATIENT MESSAGE (OUTPATIENT)
Dept: ADMINISTRATIVE | Facility: HOSPITAL | Age: 66
End: 2021-10-04

## 2021-10-13 DIAGNOSIS — E11.36 TYPE 2 DIABETES MELLITUS WITH DIABETIC CATARACT, WITHOUT LONG-TERM CURRENT USE OF INSULIN: ICD-10-CM

## 2021-10-15 ENCOUNTER — HOSPITAL ENCOUNTER (EMERGENCY)
Facility: HOSPITAL | Age: 66
Discharge: LEFT BEFORE DISCHARGE | End: 2021-10-15
Attending: EMERGENCY MEDICINE
Payer: MEDICARE

## 2021-10-15 VITALS
RESPIRATION RATE: 20 BRPM | TEMPERATURE: 99 F | WEIGHT: 245 LBS | SYSTOLIC BLOOD PRESSURE: 137 MMHG | OXYGEN SATURATION: 99 % | HEIGHT: 62 IN | DIASTOLIC BLOOD PRESSURE: 79 MMHG | BODY MASS INDEX: 45.08 KG/M2 | HEART RATE: 92 BPM

## 2021-10-15 DIAGNOSIS — R06.02 SOB (SHORTNESS OF BREATH): ICD-10-CM

## 2021-10-15 DIAGNOSIS — R60.0 BILATERAL LOWER EXTREMITY EDEMA: Primary | ICD-10-CM

## 2021-10-15 LAB
ALBUMIN SERPL BCP-MCNC: 4.3 G/DL (ref 3.5–5.2)
ALP SERPL-CCNC: 45 U/L (ref 55–135)
ALT SERPL W/O P-5'-P-CCNC: 29 U/L (ref 10–44)
ANION GAP SERPL CALC-SCNC: 15 MMOL/L (ref 8–16)
AST SERPL-CCNC: 30 U/L (ref 10–40)
BASOPHILS # BLD AUTO: 0.05 K/UL (ref 0–0.2)
BASOPHILS NFR BLD: 0.7 % (ref 0–1.9)
BILIRUB SERPL-MCNC: 0.2 MG/DL (ref 0.1–1)
BNP SERPL-MCNC: 18 PG/ML (ref 0–99)
BUN SERPL-MCNC: 19 MG/DL (ref 8–23)
CALCIUM SERPL-MCNC: 10.3 MG/DL (ref 8.7–10.5)
CHLORIDE SERPL-SCNC: 102 MMOL/L (ref 95–110)
CO2 SERPL-SCNC: 21 MMOL/L (ref 23–29)
CREAT SERPL-MCNC: 1.1 MG/DL (ref 0.5–1.4)
CTP QC/QA: YES
DIFFERENTIAL METHOD: ABNORMAL
EOSINOPHIL # BLD AUTO: 0 K/UL (ref 0–0.5)
EOSINOPHIL NFR BLD: 0.4 % (ref 0–8)
ERYTHROCYTE [DISTWIDTH] IN BLOOD BY AUTOMATED COUNT: 14.6 % (ref 11.5–14.5)
EST. GFR  (AFRICAN AMERICAN): >60 ML/MIN/1.73 M^2
EST. GFR  (NON AFRICAN AMERICAN): 52.8 ML/MIN/1.73 M^2
GLUCOSE SERPL-MCNC: 180 MG/DL (ref 70–110)
HCT VFR BLD AUTO: 36.7 % (ref 37–48.5)
HGB BLD-MCNC: 11.3 G/DL (ref 12–16)
IMM GRANULOCYTES # BLD AUTO: 0.01 K/UL (ref 0–0.04)
IMM GRANULOCYTES NFR BLD AUTO: 0.1 % (ref 0–0.5)
LYMPHOCYTES # BLD AUTO: 2 K/UL (ref 1–4.8)
LYMPHOCYTES NFR BLD: 29.6 % (ref 18–48)
MCH RBC QN AUTO: 26.7 PG (ref 27–31)
MCHC RBC AUTO-ENTMCNC: 30.8 G/DL (ref 32–36)
MCV RBC AUTO: 87 FL (ref 82–98)
MONOCYTES # BLD AUTO: 0.4 K/UL (ref 0.3–1)
MONOCYTES NFR BLD: 6 % (ref 4–15)
NEUTROPHILS # BLD AUTO: 4.3 K/UL (ref 1.8–7.7)
NEUTROPHILS NFR BLD: 63.2 % (ref 38–73)
NRBC BLD-RTO: 0 /100 WBC
PLATELET # BLD AUTO: 283 K/UL (ref 150–450)
PMV BLD AUTO: 9.4 FL (ref 9.2–12.9)
POTASSIUM SERPL-SCNC: 4.2 MMOL/L (ref 3.5–5.1)
PROT SERPL-MCNC: 8 G/DL (ref 6–8.4)
RBC # BLD AUTO: 4.24 M/UL (ref 4–5.4)
SARS-COV-2 RDRP RESP QL NAA+PROBE: NEGATIVE
SODIUM SERPL-SCNC: 138 MMOL/L (ref 136–145)
TROPONIN I SERPL DL<=0.01 NG/ML-MCNC: <0.006 NG/ML (ref 0–0.03)
WBC # BLD AUTO: 6.83 K/UL (ref 3.9–12.7)

## 2021-10-15 PROCEDURE — 93010 EKG 12-LEAD: ICD-10-PCS | Mod: ,,, | Performed by: INTERNAL MEDICINE

## 2021-10-15 PROCEDURE — 93005 ELECTROCARDIOGRAM TRACING: CPT

## 2021-10-15 PROCEDURE — 84484 ASSAY OF TROPONIN QUANT: CPT | Performed by: EMERGENCY MEDICINE

## 2021-10-15 PROCEDURE — U0002 COVID-19 LAB TEST NON-CDC: HCPCS | Performed by: EMERGENCY MEDICINE

## 2021-10-15 PROCEDURE — 36000 PLACE NEEDLE IN VEIN: CPT

## 2021-10-15 PROCEDURE — 83880 ASSAY OF NATRIURETIC PEPTIDE: CPT | Performed by: EMERGENCY MEDICINE

## 2021-10-15 PROCEDURE — 80053 COMPREHEN METABOLIC PANEL: CPT | Performed by: EMERGENCY MEDICINE

## 2021-10-15 PROCEDURE — 99285 EMERGENCY DEPT VISIT HI MDM: CPT | Mod: 25

## 2021-10-15 PROCEDURE — 93010 ELECTROCARDIOGRAM REPORT: CPT | Mod: ,,, | Performed by: INTERNAL MEDICINE

## 2021-10-15 PROCEDURE — 99284 PR EMERGENCY DEPT VISIT,LEVEL IV: ICD-10-PCS | Mod: CS,,, | Performed by: EMERGENCY MEDICINE

## 2021-10-15 PROCEDURE — 85025 COMPLETE CBC W/AUTO DIFF WBC: CPT | Performed by: EMERGENCY MEDICINE

## 2021-10-15 PROCEDURE — 99284 EMERGENCY DEPT VISIT MOD MDM: CPT | Mod: CS,,, | Performed by: EMERGENCY MEDICINE

## 2021-11-15 ENCOUNTER — PATIENT OUTREACH (OUTPATIENT)
Dept: ADMINISTRATIVE | Facility: HOSPITAL | Age: 66
End: 2021-11-15
Payer: MEDICARE

## 2021-11-24 ENCOUNTER — PATIENT OUTREACH (OUTPATIENT)
Dept: ADMINISTRATIVE | Facility: HOSPITAL | Age: 66
End: 2021-11-24
Payer: MEDICARE

## 2021-12-13 ENCOUNTER — PATIENT OUTREACH (OUTPATIENT)
Dept: ADMINISTRATIVE | Facility: OTHER | Age: 66
End: 2021-12-13
Payer: MEDICARE

## 2021-12-13 ENCOUNTER — PATIENT MESSAGE (OUTPATIENT)
Dept: ADMINISTRATIVE | Facility: OTHER | Age: 66
End: 2021-12-13
Payer: MEDICARE

## 2021-12-13 DIAGNOSIS — Z12.31 ENCOUNTER FOR SCREENING MAMMOGRAM FOR MALIGNANT NEOPLASM OF BREAST: Primary | ICD-10-CM

## 2022-01-12 ENCOUNTER — OFFICE VISIT (OUTPATIENT)
Dept: PSYCHIATRY | Facility: CLINIC | Age: 67
End: 2022-01-12
Payer: MEDICARE

## 2022-01-12 VITALS
HEIGHT: 62 IN | BODY MASS INDEX: 47.3 KG/M2 | HEART RATE: 109 BPM | WEIGHT: 257.06 LBS | DIASTOLIC BLOOD PRESSURE: 75 MMHG | SYSTOLIC BLOOD PRESSURE: 180 MMHG

## 2022-01-12 DIAGNOSIS — I10 ESSENTIAL HYPERTENSION: ICD-10-CM

## 2022-01-12 DIAGNOSIS — F33.0 MILD EPISODE OF RECURRENT MAJOR DEPRESSIVE DISORDER: Primary | ICD-10-CM

## 2022-01-12 PROCEDURE — 99213 OFFICE O/P EST LOW 20 MIN: CPT | Mod: PBBFAC | Performed by: NURSE PRACTITIONER

## 2022-01-12 PROCEDURE — 99999 PR PBB SHADOW E&M-EST. PATIENT-LVL III: ICD-10-PCS | Mod: PBBFAC,,, | Performed by: NURSE PRACTITIONER

## 2022-01-12 PROCEDURE — 90792 PSYCH DIAG EVAL W/MED SRVCS: CPT | Mod: ,,, | Performed by: NURSE PRACTITIONER

## 2022-01-12 PROCEDURE — 99999 PR PBB SHADOW E&M-EST. PATIENT-LVL III: CPT | Mod: PBBFAC,,, | Performed by: NURSE PRACTITIONER

## 2022-01-12 PROCEDURE — 90792 PR PSYCHIATRIC DIAGNOSTIC EVALUATION W/MEDICAL SERVICES: ICD-10-PCS | Mod: ,,, | Performed by: NURSE PRACTITIONER

## 2022-01-12 RX ORDER — VENLAFAXINE HYDROCHLORIDE 37.5 MG/1
37.5 CAPSULE, EXTENDED RELEASE ORAL DAILY
Qty: 30 CAPSULE | Refills: 0 | Status: SHIPPED | OUTPATIENT
Start: 2022-01-12 | End: 2022-02-11

## 2022-01-12 NOTE — PROGRESS NOTES
Outpatient Psychiatry Initial Visit (MD/NP)    1/12/2022 11:28 AM  Isabel Dennis  1955  [id number]        Isabel Dennis, a 66 y.o. female, presenting for initial evaluation visit. Met with patient.    Reason for Encounter: self-referral. Patient complains of   Chief Complaint   Patient presents with    Depression    Inattention         History of Present Illness:     Pt is a 66 y.o. female with a hx of asthma, DM II, HTN, TIA, depression and anxiety.    Pt arrived 30 minutes for this visit. She initially went to the wrong department.     Pt is here for evaluation of depression and lack of focus. Pt endorses depressed mood, anhedonia, decreased energy and motivation, irritability, poor concentration, and fatigue. Denies low self-esteem and hopelessness. These sxs began in childhood.    Pt denies symptoms of anxiety including nervousness, uncontrolled worry, inability to relax, restlessness or feeling keyed up or on edge, being easily fatigued, difficulty concentrating or mind going blank, irritability, and muscle tension. These sxs began in childhood.    Sleeps well at night with her medications.    Pt denies hallucinations, delusions, disorganized thinking, disorganized behavior or abnormal motor behavior, or negative symptoms (diminshed emotional expression, avolition, alogia, asociality).    Pt denies recurrent thoughts of death and denies any suicidal or homicidal plans or intentions. No objective s/sx of psychosis or gretchen.     Pt is currently taking Elavil. Pt denies any ASE from current meds.    Discussed risks, benefits and SE profile of medication options. States she was prescribed Lexapro and Cymbalta in the past. She reports Lexapro was ineffective and Cymbalta made her too sleepy.    Will complete psychosocial hx at follow up visit as pt was late for this appt and we ran out of time.    Pt's VS including BP were taken after this appt was completed. Per Epic, pt has a dx of HTN and is prescribed  medication for this.      Risk Parameters:  Patient reports no suicidal ideation  Patient reports no homicidal ideation  Patient reports no self-injurious behavior  Patient reports no violent behavior      Psychotropic medication review  Previous Trials-  Valium   Xanax  Trazodone  Lexapro - not effective  Cymbalta - sedation    Current meds-  Elavil       Stressors:    Health problems      History:   Past Psychiatric History:   Previous psych tx: no  Previous psychiatric hospitalizations: denies  Previous suicide attempts: yes multiple times as a teenager via attempting to OD; states no one knows about these attempts    Additional historical information includes:   Seizure: denies  Head trauma/TBI: denies      Past Medical, Family and Social History: The patient's past medical, family and social history, allergies, current medications, past surgical history, and problem list have been reviewed and updated as appropriate within the electronic medical record.          Current Outpatient Medications   Medication Sig Dispense Refill    amitriptyline (ELAVIL) 50 MG tablet Take 50 mg by mouth 2 (two) times daily as needed. Take as scheduled      amLODIPine (NORVASC) 5 MG tablet TAKE ONE TABLET BY MOUTH ONCE DAILY (Patient taking differently: Take morning of surgery) 90 tablet 0    aspirin 325 MG tablet Take 325 mg by mouth once daily. Hold 1 week prior to surgery      blood-glucose meter kit Use to test glucose bid      cyclobenzaprine (FLEXERIL) 10 MG tablet Take 1 tablet (10 mg total) by mouth 3 (three) times daily as needed for Muscle spasms. (Patient taking differently: Take 10 mg by mouth 3 (three) times daily as needed for Muscle spasms. Take as needed) 90 tablet 3    EScitalopram oxalate (LEXAPRO) 5 MG Tab Take as needed      FREESTYLE LITE STRIPS Strp       furosemide (LASIX) 20 MG tablet Take 20 mg by mouth once daily. Hold morning of surgery      HUMULIN 70/30 U-100 INSULIN 100 unit/mL (70-30)  injection Patient not currently taking      hydrocodone-acetaminophen 10-325mg (NORCO)  mg Tab Take 1 tablet by mouth every 4 to 6 hours as needed for Pain. (Patient taking differently: Take 1 tablet by mouth every 4 to 6 hours as needed for Pain. Take as needed) 90 tablet 0    insulin NPH-insulin regular, 70/30, (NOVOLIN 70/30) 100 unit/mL (70-30) injection Inject 10 Units into the skin. Patient not currently taking      insulin syringes, disposable, 1 mL Syrg 2 Syringes.      lancets 30 gauge Misc 1 each.      linaCLOtide (LINZESS) 145 mcg Cap capsule Take 145 mcg by mouth. Hold morning of surgery      metFORMIN (GLUCOPHAGE) 1000 MG tablet 2 (two) times daily with meals. Hold night before surgery and morning of surgery      naproxen (NAPROSYN) 500 MG tablet Take 500 mg by mouth 2 (two) times daily with meals. Hold 1 week prior to surgery      potassium chloride (KLOR-CON) 20 mEq Pack Hold morning of surgery      traZODone (DESYREL) 50 MG tablet Take night before surgery      TRUE METRIX GLUCOSE METER Misc USE TO TEST BLOOD GLUCOSE TWICE DAILY      TRUEPLUS LANCETS 33 gauge Misc USE 1 LANCET TO TEST BLOOD GLUCOSE TWICE DAILY       No current facility-administered medications for this visit.           Review Of Systems:       Review of Systems   Constitutional: Negative for chills, fever and malaise/fatigue.   Respiratory: Negative for cough and shortness of breath.    Cardiovascular: Negative for chest pain and palpitations.   Gastrointestinal: Negative for abdominal pain, diarrhea and vomiting.   Musculoskeletal: Positive for neck pain. Negative for falls.   Skin: Negative for rash.   Neurological: Negative for tremors, seizures and headaches.   Psychiatric/Behavioral:        See HPI          Current Evaluation:     Musculoskeletal  Muscle Strength/Tone:  no dyskinesia, no dystonia, no tremor, no tic   Gait & Station:  uses cane      Relevant Elements of Neurological Exam: uses a cane,  "pedro      Mental Status Evaluation:  Appearance:  unremarkable, well nourished, casually dressed, neatly groomed, obese   Behavior:  normal, friendly and cooperative, eye contact normal   Speech:  no latency; no press   Mood:  euthymic   Affect:  congruent and appropriate   Thought Process:  normal and logical   Thought Content:  normal, no suicidality, no homicidality, delusions, or paranoia   Sensorium:  grossly intact   Cognition:  grossly intact and fund of knowledge intact and appropriate to age and level of education   Insight:  intact, has awareness of illness   Judgment:  behavior is adequate to circumstances, age appropriate       Functioning in Relationships:  Spouse/Partner/Family: supportive   Peers: supportive   Employers: unemployed    Constitutional  Vitals:  Most recent vital signs, dated less than 90 days prior to this appointment, were reviewed.    Vitals:    01/12/22 1412   BP: (!) 180/75   Pulse: 109   Weight: 116.6 kg (257 lb 0.9 oz)   Height: 5' 2" (1.575 m)        General:  unremarkable, age appropriate, well nourished, casually dressed, neatly groomed, obese     Labs: reviewed most recent labs    Laboratory Data  No visits with results within 1 Month(s) from this visit.   Latest known visit with results is:   Admission on 10/15/2021, Discharged on 10/15/2021   Component Date Value Ref Range Status    POC Rapid COVID 10/15/2021 Negative  Negative Final     Acceptable 10/15/2021 Yes   Final    WBC 10/15/2021 6.83  3.90 - 12.70 K/uL Final    RBC 10/15/2021 4.24  4.00 - 5.40 M/uL Final    Hemoglobin 10/15/2021 11.3* 12.0 - 16.0 g/dL Final    Hematocrit 10/15/2021 36.7* 37.0 - 48.5 % Final    MCV 10/15/2021 87  82 - 98 fL Final    MCH 10/15/2021 26.7* 27.0 - 31.0 pg Final    MCHC 10/15/2021 30.8* 32.0 - 36.0 g/dL Final    RDW 10/15/2021 14.6* 11.5 - 14.5 % Final    Platelets 10/15/2021 283  150 - 450 K/uL Final    MPV 10/15/2021 9.4  9.2 - 12.9 fL Final    Immature " Granulocytes 10/15/2021 0.1  0.0 - 0.5 % Final    Gran # (ANC) 10/15/2021 4.3  1.8 - 7.7 K/uL Final    Immature Grans (Abs) 10/15/2021 0.01  0.00 - 0.04 K/uL Final    Lymph # 10/15/2021 2.0  1.0 - 4.8 K/uL Final    Mono # 10/15/2021 0.4  0.3 - 1.0 K/uL Final    Eos # 10/15/2021 0.0  0.0 - 0.5 K/uL Final    Baso # 10/15/2021 0.05  0.00 - 0.20 K/uL Final    nRBC 10/15/2021 0  0 /100 WBC Final    Gran % 10/15/2021 63.2  38.0 - 73.0 % Final    Lymph % 10/15/2021 29.6  18.0 - 48.0 % Final    Mono % 10/15/2021 6.0  4.0 - 15.0 % Final    Eosinophil % 10/15/2021 0.4  0.0 - 8.0 % Final    Basophil % 10/15/2021 0.7  0.0 - 1.9 % Final    Differential Method 10/15/2021 Automated   Final    Sodium 10/15/2021 138  136 - 145 mmol/L Final    Potassium 10/15/2021 4.2  3.5 - 5.1 mmol/L Final    Chloride 10/15/2021 102  95 - 110 mmol/L Final    CO2 10/15/2021 21* 23 - 29 mmol/L Final    Glucose 10/15/2021 180* 70 - 110 mg/dL Final    BUN 10/15/2021 19  8 - 23 mg/dL Final    Creatinine 10/15/2021 1.1  0.5 - 1.4 mg/dL Final    Calcium 10/15/2021 10.3  8.7 - 10.5 mg/dL Final    Total Protein 10/15/2021 8.0  6.0 - 8.4 g/dL Final    Albumin 10/15/2021 4.3  3.5 - 5.2 g/dL Final    Total Bilirubin 10/15/2021 0.2  0.1 - 1.0 mg/dL Final    Alkaline Phosphatase 10/15/2021 45* 55 - 135 U/L Final    AST 10/15/2021 30  10 - 40 U/L Final    ALT 10/15/2021 29  10 - 44 U/L Final    Anion Gap 10/15/2021 15  8 - 16 mmol/L Final    eGFR if African American 10/15/2021 >60.0  >60 mL/min/1.73 m^2 Final    eGFR if non  10/15/2021 52.8* >60 mL/min/1.73 m^2 Final    Troponin I 10/15/2021 <0.006  0.000 - 0.026 ng/mL Final    BNP 10/15/2021 18  0 - 99 pg/mL Final         Medications  Outpatient Encounter Medications as of 1/12/2022   Medication Sig Dispense Refill    amitriptyline (ELAVIL) 50 MG tablet Take 50 mg by mouth 2 (two) times daily as needed. Take as scheduled      amLODIPine (NORVASC) 5 MG tablet TAKE  ONE TABLET BY MOUTH ONCE DAILY (Patient taking differently: Take morning of surgery) 90 tablet 0    aspirin 325 MG tablet Take 325 mg by mouth once daily. Hold 1 week prior to surgery      blood-glucose meter kit Use to test glucose bid      cyclobenzaprine (FLEXERIL) 10 MG tablet Take 1 tablet (10 mg total) by mouth 3 (three) times daily as needed for Muscle spasms. (Patient taking differently: Take 10 mg by mouth 3 (three) times daily as needed for Muscle spasms. Take as needed) 90 tablet 3    EScitalopram oxalate (LEXAPRO) 5 MG Tab Take as needed      FREESTYLE LITE STRIPS Strp       furosemide (LASIX) 20 MG tablet Take 20 mg by mouth once daily. Hold morning of surgery      HUMULIN 70/30 U-100 INSULIN 100 unit/mL (70-30) injection Patient not currently taking      hydrocodone-acetaminophen 10-325mg (NORCO)  mg Tab Take 1 tablet by mouth every 4 to 6 hours as needed for Pain. (Patient taking differently: Take 1 tablet by mouth every 4 to 6 hours as needed for Pain. Take as needed) 90 tablet 0    insulin NPH-insulin regular, 70/30, (NOVOLIN 70/30) 100 unit/mL (70-30) injection Inject 10 Units into the skin. Patient not currently taking      insulin syringes, disposable, 1 mL Syrg 2 Syringes.      lancets 30 gauge Misc 1 each.      linaCLOtide (LINZESS) 145 mcg Cap capsule Take 145 mcg by mouth. Hold morning of surgery      metFORMIN (GLUCOPHAGE) 1000 MG tablet 2 (two) times daily with meals. Hold night before surgery and morning of surgery      naproxen (NAPROSYN) 500 MG tablet Take 500 mg by mouth 2 (two) times daily with meals. Hold 1 week prior to surgery      potassium chloride (KLOR-CON) 20 mEq Pack Hold morning of surgery      traZODone (DESYREL) 50 MG tablet Take night before surgery      TRUE METRIX GLUCOSE METER Misc USE TO TEST BLOOD GLUCOSE TWICE DAILY      TRUEPLUS LANCETS 33 gauge Misc USE 1 LANCET TO TEST BLOOD GLUCOSE TWICE DAILY       No facility-administered encounter  medications on file as of 1/12/2022.           Assessment - Diagnosis - Goals:     Impression:       ICD-10-CM ICD-9-CM   1. Mild episode of recurrent major depressive disorder  F33.0 296.31   2. Essential hypertension  I10 401.9       Strengths and Liabilities: Strength: Patient accepts guidance/feedback, Strength: Patient is expressive/articulate., Strength: Patient is intelligent., Strength: Patient is motivated for change., Strength: Patient has positive support network., Strength: Patient has reasonable judgment., Strength: Patient is stable., Liability: Patient has poor health., Liability: Patient lacks coping skills.    Treatment Goals:    Depression: decreasing worthlessness, increasing energy, increasing interest in usual activities, increasing motivation, increasing self-reward for positive behaviors (one/day), increasing self-reward for positive thoughts (one/day), increasing social contacts (three/week), reducing excessive guilt, reducing fatigue and reducing negative automatic thoughts      Treatment Plan/Recommendations:   · Medication Management:  · Start trial of Effexor XR 37.5 mg q day  · Labs: most recent labs reviewed  Antidepressants: The pt was educated on the following: Antidepressant medication must be taken every day in order to see any improvement in symptoms. It takes 4-6 weeks to see the full therapeutic effect of the medication. The effects of the medication are very individualized and possible SE of the medication were discussed. Counseled on FDA black box warning of increased suicidality and contingency plans if such occurs. The patient expresses understanding of the above and displays the capacity to agree with this treatment given said understanding. Patient also agrees that, currently, the benefits outweigh the risks and would like to pursue treatment at this time.   · The treatment plan and follow up plan were reviewed with the patient.  · Discussed with patient informed consent,  risks vs. benefits, alternative treatments, side effect profile and the inherent unpredictability of individual responses to treatments and all medications prescribed. The patient expresses understanding of the above and displays the capacity to agree with this current plan and had no other questions.   · Encouraged the patient to keep future appointments.   · Take medications as prescribed and abstain from substance use.   · Pt was told to present to ED or call 911 for SI/HI plan or intent, psychosis, or other psychiatric or medical emergency, and pt agrees to this and verbalized understanding.        Return to Clinic: 2 weeks      Face to Face time with patient: 30 minutes  Total time: 55 minutes of total time spent on the encounter, which includes face to face time and non-face to face time preparing to see the patient (eg, review of tests), Obtaining and/or reviewing separately obtained history, Documenting clinical information in the electronic or other health record, Independently interpreting results (not separately reported) and communicating results to the patient/family/caregiver, or Care coordination (not separately reported).         Nhung Gu, MSN, APRN, PMHNP-BC  [facility]

## 2022-01-19 ENCOUNTER — PATIENT MESSAGE (OUTPATIENT)
Dept: ADMINISTRATIVE | Facility: HOSPITAL | Age: 67
End: 2022-01-19
Payer: MEDICARE

## 2022-03-16 ENCOUNTER — PATIENT MESSAGE (OUTPATIENT)
Dept: ADMINISTRATIVE | Facility: HOSPITAL | Age: 67
End: 2022-03-16
Payer: MEDICARE

## 2022-06-01 ENCOUNTER — PES CALL (OUTPATIENT)
Dept: ADMINISTRATIVE | Facility: CLINIC | Age: 67
End: 2022-06-01
Payer: MEDICARE

## 2022-06-23 ENCOUNTER — TELEPHONE (OUTPATIENT)
Dept: ENDOSCOPY | Facility: HOSPITAL | Age: 67
End: 2022-06-23
Payer: MEDICARE

## 2022-06-23 NOTE — TELEPHONE ENCOUNTER
----- Message from Vini Rivera MD sent at 6/23/2022  3:01 PM CDT -----  I cannot find any referrals.  Her last colonoscopy here was 2013, but I am not sure if she had one somewhere else since then.      I think someone needs to call her to find out if she is coming to clinic just to schedule a colonoscopy, or if there is something else she needs.  Also need to check with her when her last colonoscopy was.  If it is just for a colonoscopy, she does not need a clinic visit - I can order.    Shun       ----- Message -----  From: Alyssa Rodríguez MA  Sent: 6/23/2022   2:42 PM CDT  To: Vini Rivera MD    Please see if she just needs a colonoscopy

## 2022-06-28 ENCOUNTER — TELEPHONE (OUTPATIENT)
Dept: ENDOSCOPY | Facility: HOSPITAL | Age: 67
End: 2022-06-28
Payer: MEDICARE

## 2022-06-28 NOTE — TELEPHONE ENCOUNTER
----- Message from [facility] sent at 6/28/2022 10:34 AM CDT -----  Type:  Needs Medical Advice    Who Called: PT   Would the patient rather a call back or a response via [facility]? Callback   Best Call Back Number: [phone]  Additional Information: Pt requesting callback from office to reschedule appt

## 2022-06-30 ENCOUNTER — TELEPHONE (OUTPATIENT)
Dept: ORTHOPEDICS | Facility: CLINIC | Age: 67
End: 2022-06-30
Payer: MEDICARE

## 2022-06-30 NOTE — TELEPHONE ENCOUNTER
I called and spoke to the patient and was unable to reschedule her with someone for her arm. The patient stated that she needed therapy and I informed her that I can schedule her with someone for her right arm pain. The patient then proceeded to tell me that she cannot lose any weight. I am unsure what the patient might need at this moment. I attempted to have her call her PCP to see if he may recommend someone for her to see. The patient then hung up on me.

## 2022-06-30 NOTE — TELEPHONE ENCOUNTER
I called the patient to reschedule her with someone for her arm. The patient did not answer. I left a voicemail with a call back number.

## 2022-07-01 ENCOUNTER — PATIENT MESSAGE (OUTPATIENT)
Dept: ORTHOPEDICS | Facility: CLINIC | Age: 67
End: 2022-07-01
Payer: MEDICARE

## 2022-08-31 DIAGNOSIS — Z78.0 MENOPAUSE: ICD-10-CM

## 2022-09-30 ENCOUNTER — TELEPHONE (OUTPATIENT)
Dept: INTERNAL MEDICINE | Facility: CLINIC | Age: 67
End: 2022-09-30
Payer: MEDICARE

## 2022-09-30 DIAGNOSIS — Z86.010 HISTORY OF COLONIC POLYPS: Primary | ICD-10-CM

## 2022-09-30 NOTE — TELEPHONE ENCOUNTER
----- Message from Holli Mo sent at 9/30/2022 12:30 PM CDT -----  Contact: [phone]  Pt states no one has called her re scheduling a colonoscopy. She states she has polyps and was not suppose to wait this long b/w colonoscopy's. She wants to know how it happened that it has been 13 years since her last colonoscopy and no one caught that and ordered it for her. Also, she states she needs an appt for a f/u to see Dr Olguin (she states she should not have to tell me why she needs to be seen). She states she needs to be seen right away and cannot wait till Dr Olguin  next available. Can you please call this patient to assist her?

## 2022-11-21 ENCOUNTER — TELEPHONE (OUTPATIENT)
Dept: INTERNAL MEDICINE | Facility: CLINIC | Age: 67
End: 2022-11-21
Payer: MEDICARE

## 2022-11-21 NOTE — TELEPHONE ENCOUNTER
----- Message from Mary Pattenaleah sent at 11/18/2022  2:43 PM CST -----  Contact: Patient @ [phone]  Caller is requesting an earlier appointment then we can schedule.  Caller is requesting a message be sent to the provider.  If this is for urgent care symptoms, did you offer other providers at this location, providers at other locations, or [facility]? (yes, no, n/a):  YES  If this is for the patients physical, did you offer to schedule next available and put on wait list, or to see NP or PA for their physical?  (yes, no, n/a):  YES  When is the next available appointment with their provider:  02/2023  Reason for the appointment:  follow up  Patient preference of timeframe to be scheduled:  need it sooner  Would the patient like a call back, or a response through their [facility] portal?:   call   Comments:

## 2022-12-12 ENCOUNTER — CLINICAL SUPPORT (OUTPATIENT)
Dept: ENDOSCOPY | Facility: HOSPITAL | Age: 67
End: 2022-12-12
Attending: INTERNAL MEDICINE
Payer: MEDICARE

## 2022-12-12 VITALS — HEIGHT: 62 IN | WEIGHT: 250 LBS | BODY MASS INDEX: 46.01 KG/M2

## 2022-12-12 DIAGNOSIS — Z12.11 SPECIAL SCREENING FOR MALIGNANT NEOPLASMS, COLON: Primary | ICD-10-CM

## 2022-12-12 DIAGNOSIS — Z86.010 HISTORY OF COLONIC POLYPS: ICD-10-CM

## 2022-12-12 RX ORDER — POLYETHYLENE GLYCOL 3350, SODIUM SULFATE ANHYDROUS, SODIUM BICARBONATE, SODIUM CHLORIDE, POTASSIUM CHLORIDE 236; 22.74; 6.74; 5.86; 2.97 G/4L; G/4L; G/4L; G/4L; G/4L
4000 POWDER, FOR SOLUTION ORAL ONCE
Status: DISCONTINUED | OUTPATIENT
Start: 2022-12-12 | End: 2022-12-12 | Stop reason: HOSPADM

## 2023-01-30 ENCOUNTER — TELEPHONE (OUTPATIENT)
Dept: ENDOSCOPY | Facility: HOSPITAL | Age: 68
End: 2023-01-30

## 2023-01-30 NOTE — TELEPHONE ENCOUNTER
----- Message from Catia Johnston MA sent at 1/30/2023 10:10 AM CST -----  Regarding: FW: THIS CASE HAS BEEN DENIED.    ----- Message -----  From: Nhung Melo  Sent: 1/27/2023   3:17 PM CST  To: [facility] Staff  Subject: THIS CASE HAS BEEN DENIED.                       Good afternoon. I am working on authorization for this procedure, and it has just been denied. The patient's insurance company will not cover the procedure because this facility is out of network. This claim will not be covered, and the patient will be responsible for payment. I did reach out to the patient to inform him as well. Thanks, and have a great day!

## 2023-01-31 ENCOUNTER — TELEPHONE (OUTPATIENT)
Dept: ENDOSCOPY | Facility: HOSPITAL | Age: 68
End: 2023-01-31
Payer: MEDICARE

## 2023-01-31 NOTE — TELEPHONE ENCOUNTER
Call  and spoke patient. Patient.told me she will call me back.because she was out handing business

## 2023-02-02 ENCOUNTER — TELEPHONE (OUTPATIENT)
Dept: ENDOSCOPY | Facility: HOSPITAL | Age: 68
End: 2023-02-02
Payer: MEDICARE

## 2023-02-02 NOTE — TELEPHONE ENCOUNTER
MA spoke with patient.Patient was cussing at me stated no one never called her patient was really hard to help.

## 2023-06-16 PROBLEM — M25.562 ACUTE PAIN OF LEFT KNEE: Status: ACTIVE | Noted: 2017-07-07

## 2025-05-11 NOTE — PLAN OF CARE
Pt A & O pt repositioned with pillow every q2 hrs,  No skin breakdown noted .  pulses palable +movement/sensation, cap refill WNL in all 4 extremities , monitored for pain and safety. Safety maintained. , pt remained afebrile 98.2 Bed locked and lowered, call light within reach. Will continue to monitor   [id number]

## (undated) DEVICE — SET CEMENT (SCULP)

## (undated) DEVICE — SUT MONOCRYL 3-0 SH U/D

## (undated) DEVICE — ADHESIVE DERMABOND ADVANCED

## (undated) DEVICE — DRAPE STERI INSTRUMENT 1018

## (undated) DEVICE — MASK FLYTE HOOD PEEL AWAY

## (undated) DEVICE — SUT CTD VICRYL 0 UND BR CPX

## (undated) DEVICE — SEE MEDLINE ITEM 152487

## (undated) DEVICE — SYR 30CC LUER LOCK

## (undated) DEVICE — ELECTRODE REM PLYHSV RETURN 9

## (undated) DEVICE — KIT IRR SUCTION HND PIECE

## (undated) DEVICE — ADHESIVE MASTISOL VIAL 48/BX

## (undated) DEVICE — SEE MEDLINE ITEM 157117

## (undated) DEVICE — SUT VICRYL BR 1 GEN 27 CT-1

## (undated) DEVICE — BRUSH SURGICAL SCRUB STERILE

## (undated) DEVICE — APPLICATOR CHLORAPREP ORN 26ML

## (undated) DEVICE — SEE MEDLINE ITEM 154981

## (undated) DEVICE — KIT TOTAL KNEE TKOFG

## (undated) DEVICE — SUT VICRYL 3-0 27 SH

## (undated) DEVICE — SEE MEDLINE ITEM 146298

## (undated) DEVICE — TOURNIQUET SB QC DP 34X4IN

## (undated) DEVICE — SOL IRR NACL .9% 3000ML

## (undated) DEVICE — TRAY MINOR GEN SURG

## (undated) DEVICE — Device

## (undated) DEVICE — NDL 18GA X1 1/2 REG BEVEL

## (undated) DEVICE — PUMP COLD THERAPY

## (undated) DEVICE — BANDAGE ESMARK 6X12

## (undated) DEVICE — DRESSING TELFA STRL 4X3 LF

## (undated) DEVICE — SPONGE DERMACEA 4X4IN 12PLY

## (undated) DEVICE — SEE MEDLINE ITEM 157148

## (undated) DEVICE — DRESSING AQUACEL AG ADV 3.5X12

## (undated) DEVICE — HOOD T-5 TEAR AWAY STERILE

## (undated) DEVICE — PAD COLD THERAPY KNEE WRAP ON

## (undated) DEVICE — NDL HYPO REG 25G X 1 1/2

## (undated) DEVICE — BLADE RECIP RIBBED

## (undated) DEVICE — GOWN SURGICAL X-LARGE

## (undated) DEVICE — BOWL CEMENT

## (undated) DEVICE — BLADE SAG 13.0 X1.27X90

## (undated) DEVICE — CLOSURE SKIN STERI STRIP 1/2X4

## (undated) DEVICE — SUT MCRYL PLUS 4-0 PS2 27IN

## (undated) DEVICE — SUT CTD VICRYL 2-0 CR/CT-2

## (undated) DEVICE — SEE MEDLINE ITEM 157131

## (undated) DEVICE — BLADE SURG CARBON STEEL #10

## (undated) DEVICE — DRESSING TELFA N ADH 3X8

## (undated) DEVICE — SYR ONLY LUER LOCK 20CC

## (undated) DEVICE — DRESSING TRANS 4X4 TEGADERM

## (undated) DEVICE — TAPE SURG DURAPORE 2 X10YD

## (undated) DEVICE — SUT ETHILON 3-0 PS2 18 BLK

## (undated) DEVICE — BLADE SAG 18.0X1.27X100